# Patient Record
Sex: FEMALE | Race: WHITE | Employment: FULL TIME | URBAN - METROPOLITAN AREA
[De-identification: names, ages, dates, MRNs, and addresses within clinical notes are randomized per-mention and may not be internally consistent; named-entity substitution may affect disease eponyms.]

---

## 2017-04-24 ENCOUNTER — HOSPITAL ENCOUNTER (EMERGENCY)
Age: 47
Discharge: LEFT BEFORE DISCHARGE | End: 2017-04-24
Attending: EMERGENCY MEDICINE | Admitting: FAMILY MEDICINE
Payer: MEDICARE

## 2017-04-24 ENCOUNTER — APPOINTMENT (OUTPATIENT)
Dept: GENERAL RADIOLOGY | Age: 47
End: 2017-04-24
Payer: MEDICARE

## 2017-04-24 VITALS
SYSTOLIC BLOOD PRESSURE: 128 MMHG | OXYGEN SATURATION: 96 % | TEMPERATURE: 97.6 F | DIASTOLIC BLOOD PRESSURE: 79 MMHG | HEIGHT: 63 IN | RESPIRATION RATE: 14 BRPM | WEIGHT: 97 LBS | BODY MASS INDEX: 17.19 KG/M2 | HEART RATE: 66 BPM

## 2017-04-24 DIAGNOSIS — R07.9 CHEST PAIN, UNSPECIFIED TYPE: Primary | ICD-10-CM

## 2017-04-24 LAB
ABSOLUTE EOS #: 0.23 K/UL (ref 0–0.4)
ABSOLUTE LYMPH #: 1.94 K/UL (ref 1–4.8)
ABSOLUTE MONO #: 0.8 K/UL (ref 0.1–1.3)
ALBUMIN SERPL-MCNC: 4.5 G/DL (ref 3.5–5.2)
ALBUMIN/GLOBULIN RATIO: ABNORMAL (ref 1–2.5)
ALP BLD-CCNC: 69 U/L (ref 35–104)
ALT SERPL-CCNC: 9 U/L (ref 5–33)
ANION GAP SERPL CALCULATED.3IONS-SCNC: 13 MMOL/L (ref 9–17)
AST SERPL-CCNC: 14 U/L
BASOPHILS # BLD: 1 %
BASOPHILS ABSOLUTE: 0.11 K/UL (ref 0–0.2)
BILIRUB SERPL-MCNC: 0.4 MG/DL (ref 0.3–1.2)
BUN BLDV-MCNC: 12 MG/DL (ref 6–20)
BUN/CREAT BLD: ABNORMAL (ref 9–20)
CALCIUM SERPL-MCNC: 9.6 MG/DL (ref 8.6–10.4)
CHLORIDE BLD-SCNC: 102 MMOL/L (ref 98–107)
CO2: 26 MMOL/L (ref 20–31)
CREAT SERPL-MCNC: 0.82 MG/DL (ref 0.5–0.9)
DIFFERENTIAL TYPE: ABNORMAL
EOSINOPHILS RELATIVE PERCENT: 2 %
GFR AFRICAN AMERICAN: >60 ML/MIN
GFR NON-AFRICAN AMERICAN: >60 ML/MIN
GFR SERPL CREATININE-BSD FRML MDRD: ABNORMAL ML/MIN/{1.73_M2}
GFR SERPL CREATININE-BSD FRML MDRD: ABNORMAL ML/MIN/{1.73_M2}
GLUCOSE BLD-MCNC: 102 MG/DL (ref 70–99)
HCT VFR BLD CALC: 46.5 % (ref 36–46)
HEMOGLOBIN: 15.3 G/DL (ref 12–16)
INR BLD: 1
LYMPHOCYTES # BLD: 17 %
MAGNESIUM: 1.9 MG/DL (ref 1.6–2.6)
MCH RBC QN AUTO: 29.2 PG (ref 26–34)
MCHC RBC AUTO-ENTMCNC: 32.8 G/DL (ref 31–37)
MCV RBC AUTO: 89 FL (ref 80–100)
MONOCYTES # BLD: 7 %
MORPHOLOGY: ABNORMAL
PDW BLD-RTO: 14.6 % (ref 11.5–14.9)
PLATELET # BLD: 145 K/UL (ref 150–450)
PLATELET ESTIMATE: ABNORMAL
PMV BLD AUTO: 9.9 FL (ref 6–12)
POTASSIUM SERPL-SCNC: 3.7 MMOL/L (ref 3.7–5.3)
PROTHROMBIN TIME: 10.6 SEC (ref 9.7–12)
RBC # BLD: 5.23 M/UL (ref 4–5.2)
RBC # BLD: ABNORMAL 10*6/UL
SEG NEUTROPHILS: 73 %
SEGMENTED NEUTROPHILS ABSOLUTE COUNT: 8.32 K/UL (ref 1.3–9.1)
SODIUM BLD-SCNC: 141 MMOL/L (ref 135–144)
TOTAL PROTEIN: 7.3 G/DL (ref 6.4–8.3)
TROPONIN INTERP: NORMAL
TROPONIN INTERP: NORMAL
TROPONIN T: <0.03 NG/ML
TROPONIN T: <0.03 NG/ML
WBC # BLD: 11.4 K/UL (ref 3.5–11)
WBC # BLD: ABNORMAL 10*3/UL

## 2017-04-24 PROCEDURE — 93005 ELECTROCARDIOGRAM TRACING: CPT

## 2017-04-24 PROCEDURE — 85610 PROTHROMBIN TIME: CPT

## 2017-04-24 PROCEDURE — 71020 XR CHEST STANDARD TWO VW: CPT

## 2017-04-24 PROCEDURE — 83735 ASSAY OF MAGNESIUM: CPT

## 2017-04-24 PROCEDURE — G0378 HOSPITAL OBSERVATION PER HR: HCPCS

## 2017-04-24 PROCEDURE — 6370000000 HC RX 637 (ALT 250 FOR IP)

## 2017-04-24 PROCEDURE — 85025 COMPLETE CBC W/AUTO DIFF WBC: CPT

## 2017-04-24 PROCEDURE — 80053 COMPREHEN METABOLIC PANEL: CPT

## 2017-04-24 PROCEDURE — 6360000002 HC RX W HCPCS: Performed by: EMERGENCY MEDICINE

## 2017-04-24 PROCEDURE — 99285 EMERGENCY DEPT VISIT HI MDM: CPT

## 2017-04-24 PROCEDURE — 96374 THER/PROPH/DIAG INJ IV PUSH: CPT

## 2017-04-24 PROCEDURE — 84484 ASSAY OF TROPONIN QUANT: CPT

## 2017-04-24 PROCEDURE — 36415 COLL VENOUS BLD VENIPUNCTURE: CPT

## 2017-04-24 RX ORDER — NITROGLYCERIN 0.4 MG/1
0.4 TABLET SUBLINGUAL ONCE
Status: DISCONTINUED | OUTPATIENT
Start: 2017-04-24 | End: 2017-04-24 | Stop reason: HOSPADM

## 2017-04-24 RX ORDER — SODIUM CHLORIDE 0.9 % (FLUSH) 0.9 %
10 SYRINGE (ML) INJECTION PRN
Status: CANCELLED | OUTPATIENT
Start: 2017-04-24

## 2017-04-24 RX ORDER — MORPHINE SULFATE 4 MG/ML
4 INJECTION, SOLUTION INTRAMUSCULAR; INTRAVENOUS ONCE
Status: COMPLETED | OUTPATIENT
Start: 2017-04-24 | End: 2017-04-24

## 2017-04-24 RX ORDER — ASPIRIN 81 MG/1
TABLET, CHEWABLE ORAL
Status: COMPLETED
Start: 2017-04-24 | End: 2017-04-24

## 2017-04-24 RX ORDER — ACETAMINOPHEN 325 MG/1
650 TABLET ORAL EVERY 4 HOURS PRN
Status: CANCELLED | OUTPATIENT
Start: 2017-04-24

## 2017-04-24 RX ORDER — SODIUM CHLORIDE 0.9 % (FLUSH) 0.9 %
10 SYRINGE (ML) INJECTION EVERY 12 HOURS SCHEDULED
Status: CANCELLED | OUTPATIENT
Start: 2017-04-24

## 2017-04-24 RX ORDER — NICOTINE 21 MG/24HR
1 PATCH, TRANSDERMAL 24 HOURS TRANSDERMAL DAILY
Status: DISCONTINUED | OUTPATIENT
Start: 2017-04-24 | End: 2017-04-24 | Stop reason: HOSPADM

## 2017-04-24 RX ORDER — ASPIRIN 325 MG
325 TABLET ORAL ONCE
Status: DISCONTINUED | OUTPATIENT
Start: 2017-04-24 | End: 2017-04-24 | Stop reason: HOSPADM

## 2017-04-24 RX ADMIN — MORPHINE SULFATE 4 MG: 4 INJECTION, SOLUTION INTRAMUSCULAR; INTRAVENOUS at 11:34

## 2017-04-24 RX ADMIN — ASPIRIN 324 MG: 81 TABLET, CHEWABLE ORAL at 10:28

## 2017-04-24 ASSESSMENT — PAIN SCALES - GENERAL
PAINLEVEL_OUTOF10: 0
PAINLEVEL_OUTOF10: 6
PAINLEVEL_OUTOF10: 6

## 2017-04-24 ASSESSMENT — PAIN DESCRIPTION - LOCATION: LOCATION: BACK

## 2017-04-24 ASSESSMENT — PAIN DESCRIPTION - PAIN TYPE: TYPE: ACUTE PAIN

## 2017-04-25 LAB
EKG ATRIAL RATE: 76 BPM
EKG P AXIS: 68 DEGREES
EKG P-R INTERVAL: 126 MS
EKG Q-T INTERVAL: 358 MS
EKG QRS DURATION: 88 MS
EKG QTC CALCULATION (BAZETT): 402 MS
EKG R AXIS: 59 DEGREES
EKG T AXIS: 57 DEGREES
EKG VENTRICULAR RATE: 76 BPM

## 2018-02-25 ENCOUNTER — APPOINTMENT (OUTPATIENT)
Dept: GENERAL RADIOLOGY | Age: 48
End: 2018-02-25
Payer: MEDICARE

## 2018-02-25 ENCOUNTER — HOSPITAL ENCOUNTER (EMERGENCY)
Age: 48
Discharge: HOME | End: 2018-02-25
Attending: EMERGENCY MEDICINE
Payer: MEDICARE

## 2018-02-25 VITALS
HEART RATE: 76 BPM | DIASTOLIC BLOOD PRESSURE: 68 MMHG | WEIGHT: 106 LBS | TEMPERATURE: 98.2 F | SYSTOLIC BLOOD PRESSURE: 120 MMHG | BODY MASS INDEX: 18.78 KG/M2 | OXYGEN SATURATION: 95 % | HEIGHT: 63 IN | RESPIRATION RATE: 16 BRPM

## 2018-02-25 DIAGNOSIS — R07.9 CHEST PAIN, UNSPECIFIED TYPE: ICD-10-CM

## 2018-02-25 DIAGNOSIS — J44.1 COPD EXACERBATION (HCC): Primary | ICD-10-CM

## 2018-02-25 LAB
ABSOLUTE EOS #: 0.1 K/UL (ref 0–0.4)
ABSOLUTE IMMATURE GRANULOCYTE: ABNORMAL K/UL (ref 0–0.3)
ABSOLUTE LYMPH #: 2.5 K/UL (ref 1–4.8)
ABSOLUTE MONO #: 0.8 K/UL (ref 0.1–1.3)
ANION GAP SERPL CALCULATED.3IONS-SCNC: 13 MMOL/L (ref 9–17)
BASOPHILS # BLD: 1 % (ref 0–2)
BASOPHILS ABSOLUTE: 0.1 K/UL (ref 0–0.2)
BUN BLDV-MCNC: 6 MG/DL (ref 6–20)
BUN/CREAT BLD: ABNORMAL (ref 9–20)
CALCIUM SERPL-MCNC: 9.1 MG/DL (ref 8.6–10.4)
CHLORIDE BLD-SCNC: 100 MMOL/L (ref 98–107)
CO2: 25 MMOL/L (ref 20–31)
CREAT SERPL-MCNC: 0.76 MG/DL (ref 0.5–0.9)
DIFFERENTIAL TYPE: ABNORMAL
EKG ATRIAL RATE: 80 BPM
EKG P AXIS: 52 DEGREES
EKG P-R INTERVAL: 126 MS
EKG Q-T INTERVAL: 348 MS
EKG QRS DURATION: 82 MS
EKG QTC CALCULATION (BAZETT): 401 MS
EKG R AXIS: 55 DEGREES
EKG T AXIS: 58 DEGREES
EKG VENTRICULAR RATE: 80 BPM
EOSINOPHILS RELATIVE PERCENT: 1 % (ref 0–4)
GFR AFRICAN AMERICAN: >60 ML/MIN
GFR NON-AFRICAN AMERICAN: >60 ML/MIN
GFR SERPL CREATININE-BSD FRML MDRD: ABNORMAL ML/MIN/{1.73_M2}
GFR SERPL CREATININE-BSD FRML MDRD: ABNORMAL ML/MIN/{1.73_M2}
GLUCOSE BLD-MCNC: 101 MG/DL (ref 70–99)
HCT VFR BLD CALC: 41.8 % (ref 36–46)
HEMOGLOBIN: 14.4 G/DL (ref 12–16)
IMMATURE GRANULOCYTES: ABNORMAL %
LYMPHOCYTES # BLD: 24 % (ref 24–44)
MCH RBC QN AUTO: 30.7 PG (ref 26–34)
MCHC RBC AUTO-ENTMCNC: 34.5 G/DL (ref 31–37)
MCV RBC AUTO: 89.2 FL (ref 80–100)
MONOCYTES # BLD: 8 % (ref 1–7)
NRBC AUTOMATED: ABNORMAL PER 100 WBC
PDW BLD-RTO: 13.8 % (ref 11.5–14.9)
PLATELET # BLD: 166 K/UL (ref 150–450)
PLATELET ESTIMATE: ABNORMAL
PMV BLD AUTO: 9.7 FL (ref 6–12)
POTASSIUM SERPL-SCNC: 3.6 MMOL/L (ref 3.7–5.3)
RBC # BLD: 4.68 M/UL (ref 4–5.2)
RBC # BLD: ABNORMAL 10*6/UL
SEG NEUTROPHILS: 66 % (ref 36–66)
SEGMENTED NEUTROPHILS ABSOLUTE COUNT: 6.7 K/UL (ref 1.3–9.1)
SODIUM BLD-SCNC: 138 MMOL/L (ref 135–144)
TROPONIN INTERP: NORMAL
TROPONIN INTERP: NORMAL
TROPONIN T: <0.03 NG/ML
TROPONIN T: <0.03 NG/ML
WBC # BLD: 10.2 K/UL (ref 3.5–11)
WBC # BLD: ABNORMAL 10*3/UL

## 2018-02-25 PROCEDURE — 36415 COLL VENOUS BLD VENIPUNCTURE: CPT

## 2018-02-25 PROCEDURE — 80048 BASIC METABOLIC PNL TOTAL CA: CPT

## 2018-02-25 PROCEDURE — 71046 X-RAY EXAM CHEST 2 VIEWS: CPT

## 2018-02-25 PROCEDURE — 85025 COMPLETE CBC W/AUTO DIFF WBC: CPT

## 2018-02-25 PROCEDURE — 93005 ELECTROCARDIOGRAM TRACING: CPT

## 2018-02-25 PROCEDURE — 84484 ASSAY OF TROPONIN QUANT: CPT

## 2018-02-25 PROCEDURE — 99285 EMERGENCY DEPT VISIT HI MDM: CPT

## 2018-02-25 RX ORDER — PREDNISONE 10 MG/1
TABLET ORAL
Qty: 20 TABLET | Refills: 0 | Status: SHIPPED | OUTPATIENT
Start: 2018-02-25 | End: 2018-03-07

## 2018-02-25 ASSESSMENT — ENCOUNTER SYMPTOMS
COUGH: 1
VOMITING: 0
EYE PAIN: 0
EYE DISCHARGE: 0
ABDOMINAL PAIN: 0
SHORTNESS OF BREATH: 1
NAUSEA: 0
DIARRHEA: 0
SORE THROAT: 0

## 2018-02-25 ASSESSMENT — PAIN SCALES - GENERAL: PAINLEVEL_OUTOF10: 5

## 2018-02-25 ASSESSMENT — PAIN DESCRIPTION - LOCATION: LOCATION: ARM

## 2018-02-25 ASSESSMENT — PAIN DESCRIPTION - DESCRIPTORS: DESCRIPTORS: SHARP

## 2018-02-25 ASSESSMENT — PAIN DESCRIPTION - ORIENTATION: ORIENTATION: RIGHT

## 2018-02-25 NOTE — ED PROVIDER NOTES
Cardiovascular: Normal rate and regular rhythm. Exam reveals no gallop and no friction rub. No murmur heard. Strong radial and DP pulses bilaterally, no murmur   Pulmonary/Chest: Effort normal. No respiratory distress. She has wheezes. She has no rales. Patient with end expiratory wheezes throughout   Abdominal: Soft. Bowel sounds are normal. There is no tenderness. There is no rebound and no guarding. Abdomen soft, nontender, no pulsatile mass   Musculoskeletal: She exhibits no edema or deformity. No lower extremity swelling or tenderness   Neurological: She is alert and oriented to person, place, and time. She exhibits normal muscle tone. Alert and oriented ×3  Cranial nerves II through XII grossly intact bilaterally  5/5 strength and sensation intact to bilateral upper and lower extremities  Negative finger-nose bilaterally, negative heel-to-shin bilaterally, negative pronator drift   Skin: Skin is warm and dry. No rash noted. Psychiatric: She has a normal mood and affect. Thought content normal.   Nursing note and vitals reviewed.       DIFFERENTIAL  DIAGNOSIS     PLAN (LABS / IMAGING / EKG):  Orders Placed This Encounter   Procedures    XR CHEST STANDARD (2 VW)    Troponin    CBC Auto Differential    Basic Metabolic Panel    Telemetry monitoring    Inpatient consult to Primary Care Provider    Pulse oximetry, continuous    EKG 12 Lead    Insert peripheral IV       MEDICATIONS ORDERED:  Orders Placed This Encounter   Medications    predniSONE (DELTASONE) 10 MG tablet     Sig: Take 4 tablets by mouth once daily for 5 days     Dispense:  20 tablet     Refill:  0       DDX: ACS, COPD exacerbation, pulmonary embolism, aortic dissection, URI, bronchitis, pneumonia    DIAGNOSTIC RESULTS / EMERGENCY DEPARTMENT COURSE / MDM     LABS:  Results for orders placed or performed during the hospital encounter of 02/25/18   Troponin   Result Value Ref Range    Troponin T <0.03 <0.03 ng/mL    Troponin

## 2018-07-18 ENCOUNTER — OFFICE VISIT (OUTPATIENT)
Dept: FAMILY MEDICINE CLINIC | Age: 48
End: 2018-07-18
Payer: MEDICARE

## 2018-07-18 VITALS
RESPIRATION RATE: 18 BRPM | DIASTOLIC BLOOD PRESSURE: 70 MMHG | HEART RATE: 64 BPM | WEIGHT: 104 LBS | SYSTOLIC BLOOD PRESSURE: 110 MMHG | OXYGEN SATURATION: 98 % | TEMPERATURE: 97.9 F | BODY MASS INDEX: 19.14 KG/M2 | HEIGHT: 62 IN

## 2018-07-18 DIAGNOSIS — R05.9 COUGH: ICD-10-CM

## 2018-07-18 DIAGNOSIS — M25.551 CHRONIC HIP PAIN, RIGHT: ICD-10-CM

## 2018-07-18 DIAGNOSIS — J44.9 CHRONIC OBSTRUCTIVE PULMONARY DISEASE, UNSPECIFIED COPD TYPE (HCC): ICD-10-CM

## 2018-07-18 DIAGNOSIS — G89.29 CHRONIC BILATERAL LOW BACK PAIN WITH BILATERAL SCIATICA: Primary | ICD-10-CM

## 2018-07-18 DIAGNOSIS — Z45.89 TYMPANOSTOMY TUBE CHECK: ICD-10-CM

## 2018-07-18 DIAGNOSIS — M54.42 CHRONIC BILATERAL LOW BACK PAIN WITH BILATERAL SCIATICA: Primary | ICD-10-CM

## 2018-07-18 DIAGNOSIS — Z12.39 SCREENING FOR BREAST CANCER: ICD-10-CM

## 2018-07-18 DIAGNOSIS — Z00.00 PREVENTATIVE HEALTH CARE: ICD-10-CM

## 2018-07-18 DIAGNOSIS — I25.2 HISTORY OF MYOCARDIAL INFARCTION: ICD-10-CM

## 2018-07-18 DIAGNOSIS — M54.41 CHRONIC BILATERAL LOW BACK PAIN WITH BILATERAL SCIATICA: Primary | ICD-10-CM

## 2018-07-18 DIAGNOSIS — Z23 NEED FOR PROPHYLACTIC VACCINATION AGAINST STREPTOCOCCUS PNEUMONIAE (PNEUMOCOCCUS): ICD-10-CM

## 2018-07-18 DIAGNOSIS — G89.29 CHRONIC HIP PAIN, RIGHT: ICD-10-CM

## 2018-07-18 PROCEDURE — G8926 SPIRO NO PERF OR DOC: HCPCS | Performed by: NURSE PRACTITIONER

## 2018-07-18 PROCEDURE — 99203 OFFICE O/P NEW LOW 30 MIN: CPT | Performed by: NURSE PRACTITIONER

## 2018-07-18 PROCEDURE — 3023F SPIROM DOC REV: CPT | Performed by: NURSE PRACTITIONER

## 2018-07-18 PROCEDURE — 90732 PPSV23 VACC 2 YRS+ SUBQ/IM: CPT | Performed by: NURSE PRACTITIONER

## 2018-07-18 PROCEDURE — G8427 DOCREV CUR MEDS BY ELIG CLIN: HCPCS | Performed by: NURSE PRACTITIONER

## 2018-07-18 PROCEDURE — 90471 IMMUNIZATION ADMIN: CPT | Performed by: NURSE PRACTITIONER

## 2018-07-18 PROCEDURE — 4004F PT TOBACCO SCREEN RCVD TLK: CPT | Performed by: NURSE PRACTITIONER

## 2018-07-18 PROCEDURE — G8420 CALC BMI NORM PARAMETERS: HCPCS | Performed by: NURSE PRACTITIONER

## 2018-07-18 RX ORDER — NAPROXEN 500 MG/1
500 TABLET ORAL 2 TIMES DAILY PRN
Qty: 60 TABLET | Refills: 1 | Status: SHIPPED | OUTPATIENT
Start: 2018-07-18 | End: 2019-10-17 | Stop reason: ALTCHOICE

## 2018-07-18 RX ORDER — ASPIRIN 81 MG/1
81 TABLET ORAL DAILY
Qty: 30 TABLET | Refills: 3 | Status: SHIPPED | OUTPATIENT
Start: 2018-07-18 | End: 2018-12-06

## 2018-07-18 RX ORDER — TIOTROPIUM BROMIDE AND OLODATEROL 3.124; 2.736 UG/1; UG/1
SPRAY, METERED RESPIRATORY (INHALATION)
Refills: 1 | COMMUNITY
Start: 2018-07-06 | End: 2019-10-17 | Stop reason: ALTCHOICE

## 2018-07-18 ASSESSMENT — ENCOUNTER SYMPTOMS
COUGH: 1
BACK PAIN: 1
WHEEZING: 1
SORE THROAT: 0
BOWEL INCONTINENCE: 0
SHORTNESS OF BREATH: 1
RHINORRHEA: 0
SINUS PAIN: 0

## 2018-07-18 ASSESSMENT — PATIENT HEALTH QUESTIONNAIRE - PHQ9
2. FEELING DOWN, DEPRESSED OR HOPELESS: 0
SUM OF ALL RESPONSES TO PHQ9 QUESTIONS 1 & 2: 0
1. LITTLE INTEREST OR PLEASURE IN DOING THINGS: 0
SUM OF ALL RESPONSES TO PHQ QUESTIONS 1-9: 0

## 2018-07-18 NOTE — PROGRESS NOTES
Aleida Sanders. Jennifer Christianson, APRN-CNP  [address] [facility]  [address] [facility] [address]  [place] [address]  Dept: [phone]  Dept Fax: [phone]      HPI:     Disha Jackson is a 52 y.o. female who presents today for her medical conditions/complaints as noted below. Disha Jackson is c/o of Establish Care; Back Pain; and Hip Pain    Patient is here today to establish care, previous PCP was- Dr. Andrés Jefferson, last seen 2-3 years ago. Review of Medical History:    COPD: Currently on Ventolin and Stiolto, s/s well controlled, heat does make s/s worse. Never hospitalized for COPD. Quit smoking 1 week ago, was smoking 1 PPD. Follows w/Dr. All Peguero- has a PFT scheduled in 6 months. MI: States she had a \"mild heart attack\", also a TIA about 10 years ago. She previously had a stress test, which she states was OK. Does not take daily baby ASA daily, but states she was told to. Denies chest pain. Back Pain   Chronicity: Anna Monday last year, felt like legs gave out, had had lower back pain for about 20 years, pain goes into right hip. The current episode started more than 1 year ago (Denies injury, does not do heavy lifting). The problem occurs constantly. The problem has been gradually worsening since onset. The pain is present in the lumbar spine and gluteal (Right hip). The quality of the pain is described as aching. The pain radiates to the right thigh (To right hip, down RLE). The pain is at a severity of 5/10. The pain is moderate. The pain is the same all the time. The symptoms are aggravated by sitting. Associated symptoms include numbness (B/l LE's, more RLE) and tingling. Pertinent negatives include no bladder incontinence, bowel incontinence or chest pain. She has tried NSAIDs and analgesics (Tylenol) for the symptoms. The treatment provided no relief.      Past Medical History:   Diagnosis Date    COPD (chronic obstructive pulmonary disease) (HCC)     MI, old Past Surgical History:   Procedure Laterality Date    HYSTERECTOMY      TYMPANOSTOMY TUBE PLACEMENT      About 3 years ago per patient- b/l       Family History   Problem Relation Age of Onset    High Blood Pressure Father     Cancer Paternal Uncle     Cancer Paternal Grandmother        Social History   Substance Use Topics    Smoking status: Current Every Day Smoker     Packs/day: 0.25     Types: Cigarettes    Smokeless tobacco: Never Used      Comment: pt smokes about 1 cigg daily    Alcohol use No      Current Outpatient Prescriptions   Medication Sig Dispense Refill    VENTOLIN  (90 Base) MCG/ACT inhaler inhale 2 puffs by mouth every 4 hours  1    STIOLTO RESPIMAT 2.5-2.5 MCG/ACT AERS   1    aspirin EC 81 MG EC tablet Take 1 tablet by mouth daily 30 tablet 3    naproxen (NAPROSYN) 500 MG tablet Take 1 tablet by mouth 2 times daily as needed for Pain 60 tablet 1     No current facility-administered medications for this visit. No Known Allergies    Health Maintenance   Topic Date Due    HIV screen  09/27/1985    DTaP/Tdap/Td vaccine (1 - Tdap) 09/27/1989    Cervical cancer screen  09/27/1991    Flu vaccine (1) 09/01/2018    Lipid screen  08/17/2020    Pneumococcal med risk  Completed       Subjective:      Review of Systems   HENT: Negative for ear pain, rhinorrhea, sinus pain and sore throat. Respiratory: Positive for cough (Since stopping smoking), shortness of breath and wheezing. Cardiovascular: Negative for chest pain. Gastrointestinal: Negative for bowel incontinence. Genitourinary: Negative for bladder incontinence. Musculoskeletal: Positive for arthralgias and back pain. Neurological: Positive for tingling and numbness (B/l LE's, more RLE).      Objective:     /70   Pulse 64   Temp 97.9 °F (36.6 °C)   Resp 18   Ht 5' 2\" (1.575 m)   Wt 104 lb (47.2 kg)   SpO2 98%   BMI 19.02 kg/m²   Physical Exam   Constitutional: She is oriented to person, place, and time. She appears well-developed and well-nourished. Non-toxic appearance. She does not have a sickly appearance. HENT:   Head: Normocephalic and atraumatic. Right Ear: Tympanic membrane and external ear normal. A foreign body (Tympanostomy tube) is present. Left Ear: Tympanic membrane, external ear and ear canal normal.   Nose: Nose normal. Right sinus exhibits no maxillary sinus tenderness and no frontal sinus tenderness. Left sinus exhibits no maxillary sinus tenderness and no frontal sinus tenderness. Mouth/Throat: Oropharynx is clear and moist.   Eyes: Conjunctivae are normal.   Neck: Normal range of motion. Cardiovascular: Normal rate, regular rhythm and normal heart sounds. Exam reveals no gallop and no friction rub. No murmur heard. Pulmonary/Chest: Effort normal and breath sounds normal. No accessory muscle usage. No respiratory distress. She has no decreased breath sounds. She has no wheezes. She has no rhonchi. She has no rales. Musculoskeletal:        Right hip: She exhibits decreased range of motion, tenderness (Anterior) and bony tenderness. She exhibits normal strength, no swelling, no crepitus, no deformity and no laceration. Lumbar back: She exhibits decreased range of motion (- straight leg raise), tenderness, bony tenderness and pain. She exhibits no swelling, no edema, no deformity and no laceration. Lymphadenopathy:     She has no cervical adenopathy. Neurological: She is alert and oriented to person, place, and time. She has normal strength. Reflex Scores:       Patellar reflexes are 2+ on the right side and 2+ on the left side. Skin: Skin is warm and dry. No rash noted. No erythema. Psychiatric: She has a normal mood and affect. Her behavior is normal.   Nursing note and vitals reviewed.     Data:     Lab Results   Component Value Date     02/25/2018    K 3.6 02/25/2018     02/25/2018    CO2 25 02/25/2018    BUN 6 02/25/2018    CREATININE 0.76 Dispense: 30 tablet; Refill: 3    5. Tympanostomy tube check  -Will refer for further evaluation and possible treatment:  - [facility] Joanne Godfrey MD    6. Need for prophylactic vaccination against Streptococcus pneumoniae (pneumococcus)  -Vaccine administered in the office:  - PNEUMOVAX 23 subcutaneous/IM (Pneumococcal polysaccharide vaccine 23-valent >= 3yo)    7. Screening for breast cancer  - [facility] DIGITAL SCREEN W OR WO CAD BILATERAL; Future    8. Preventative health care  -Labs as follows:  - CBC; Future  - Comprehensive Metabolic Panel; Future  - Hemoglobin A1C; Future  - Lipid Panel; Future  - HIV Screen; Future  - TSH with Reflex; Future  - Vitamin D 25 Hydroxy; Future    Will obtain previous medical records from former PCP's office. Return in about 1 month (around 8/18/2018) for PAP, labs, back pain f/u. Patient given educational materials on back exercises. Discussed use, benefit, and side effects of prescribed medications. Barriers to medication compliance addressed. All patient questions answered. Patient voiced understanding.

## 2018-07-18 NOTE — PATIENT INSTRUCTIONS
Patient Education        Low Back Pain: Exercises  Your Care Instructions  Here are some examples of typical rehabilitation exercises for your condition. Start each exercise slowly. Ease off the exercise if you start to have pain. Your doctor or physical therapist will tell you when you can start these exercises and which ones will work best for you. How to do the exercises  Press-up    1. Lie on your stomach, supporting your body with your forearms. 2. Press your elbows down into the floor to raise your upper back. As you do this, relax your stomach muscles and allow your back to arch without using your back muscles. As your press up, do not let your hips or pelvis come off the floor. 3. Hold for 15 to 30 seconds, then relax. 4. Repeat 2 to 4 times. Alternate arm and leg (bird dog) exercise    Do this exercise slowly. Try to keep your body straight at all times, and do not let one hip drop lower than the other. 1. Start on the floor, on your hands and knees. 2. Tighten your belly muscles. 3. Raise one leg off the floor, and hold it straight out behind you. Be careful not to let your hip drop down, because that will twist your trunk. 4. Hold for about 6 seconds, then lower your leg and switch to the other leg. 5. Repeat 8 to 12 times on each leg. 6. Over time, work up to holding for 10 to 30 seconds each time. 7. If you feel stable and secure with your leg raised, try raising the opposite arm straight out in front of you at the same time. Knee-to-chest exercise    1. Lie on your back with your knees bent and your feet flat on the floor. 2. Bring one knee to your chest, keeping the other foot flat on the floor (or keeping the other leg straight, whichever feels better on your lower back). 3. Keep your lower back pressed to the floor. Hold for at least 15 to 30 seconds. 4. Relax, and lower the knee to the starting position. 5. Repeat with the other leg. Repeat 2 to 4 times with each leg.   6. To get touching the floor and the other heel touching the wall. 4. Repeat with your other leg. 5. Do 2 to 4 times for each leg. Hip flexor stretch    1. Kneel on the floor with one knee bent and one leg behind you. Place your forward knee over your foot. Keep your other knee touching the floor. 2. Slowly push your hips forward until you feel a stretch in the upper thigh of your rear leg. 3. Hold the stretch for at least 15 to 30 seconds. Repeat with your other leg. 4. Do 2 to 4 times on each side. Wall sit    1. Stand with your back 10 to 12 inches away from a wall. 2. Lean into the wall until your back is flat against it. 3. Slowly slide down until your knees are slightly bent, pressing your lower back into the wall. 4. Hold for about 6 seconds, then slide back up the wall. 5. Repeat 8 to 12 times. Follow-up care is a key part of your treatment and safety. Be sure to make and go to all appointments, and call your doctor if you are having problems. It's also a good idea to know your test results and keep a list of the medicines you take. Where can you learn more? Go to [email or web address]://[email or web address] org and sign in to your [employer] account. Enter B226 in the Opternative box to learn more about \"Low Back Pain: Exercises. \"     If you do not have an account, please click on the \"Sign Up Now\" link. Current as of: November 29, 2017  Content Version: 11.6  © 0745-1163 [employer]. Care instructions adapted under license by [facility] ([facility]). If you have questions about a medical condition or this instruction, always ask your healthcare professional. [place] [address] any warranty or liability for your use of this information.      Patient Education          pneumococcal polysaccharides vaccine (PPSV), 23-valent  Pronunciation:  Lizzette Russell-SOLEDAD shah  Brand:  Pneumovax 23  What is the most important information I should know about this (shot) under the skin or into a muscle of your arm or thigh. You will receive this injection in a doctor's office or other clinic setting. PPSV is usually given as a routine vaccination in adults who are 72 years and older. PPSV may also be given to people between the ages 3and 59years old who have:  · heart disease, lung disease, or diabetes;  · a cerebrospinal fluid leak, or a cochlear implant (an electronic hearing device);  · alcoholism or liver disease (including cirrhosis);  · sickle cell disease or a disorder of the spleen;  · a weak immune system caused by HIV, AIDS, cancer, kidney failure, organ transplantation, or a damaged spleen; or  · a weak immune system caused by taking steroids or receiving chemotherapy or radiation treatment. PPSV may also be given to people between the ages 23and 59years old who smoke or have asthma. PPSV should be given at least 2 weeks before the start of any treatment that can weaken your immune system. PPSV is also given at least 2 weeks before you undergo a splenectomy (surgical removal of the spleen). The timing of this vaccination is very important for it to be effective. Follow your doctor's instructions. Your doctor may recommend treating fever and pain with an aspirin-free pain reliever such as acetaminophen (Tylenol) or ibuprofen (Motrin, Advil, and others) when the shot is given and for the next 24 hours. Follow the label directions or your doctor's instructions about how much of this medicine to take. If your doctor has prescribed an antibiotic (such as penicillin) to help prevent infection with pneumococcal bacteria, do not stop using the antibiotic after you receive the PPSV. Take the antibiotic for the entire length of time prescribed by your doctor. Most people receive only one PPSV shot during their lifetime.  However, people in certain age groups or with certain disease conditions that put them at risk of infection may need to receive more than one additional information about pneumococcal polysaccharides vaccine. You may also find additional information from your local health department or the Centers for Disease Control and Prevention. Remember, keep this and all other medicines out of the reach of children, never share your medicines with others, and use this medication only for the indication prescribed. Every effort has been made to ensure that the information provided by [address] is accurate, up-to-date, and complete, but no guarantee is made to that effect. Drug information contained herein may be time sensitive. [facility] information has been compiled for use by healthcare practitioners and consumers in the [place] and therefore [facility] does not warrant that uses outside of the [place] are appropriate, unless specifically indicated otherwise. [facility]'s drug information does not endorse drugs, diagnose patients or recommend therapy. [facility]'s drug information is an informational resource designed to assist licensed healthcare practitioners in caring for their patients and/or to serve consumers viewing this service as a supplement to, and not a substitute for, the expertise, skill, knowledge and judgment of healthcare practitioners. The absence of a warning for a given drug or drug combination in no way should be construed to indicate that the drug or drug combination is safe, effective or appropriate for any given patient. [facility] does not assume any responsibility for any aspect of healthcare administered with the aid of information [facility] provides. The information contained herein is not intended to cover all possible uses, directions, precautions, warnings, drug interactions, allergic reactions, or adverse effects. If you have questions about the drugs you are taking, check with your doctor, nurse or pharmacist.  Copyright 2605-7746 [address] Version: 5.02. Revision date: 10/17/2012.   Care instructions adapted

## 2018-07-26 ENCOUNTER — HOSPITAL ENCOUNTER (OUTPATIENT)
Dept: PHYSICAL THERAPY | Age: 48
Setting detail: THERAPIES SERIES
Discharge: HOME | End: 2018-07-26
Payer: MEDICARE

## 2018-07-26 ENCOUNTER — TELEPHONE (OUTPATIENT)
Dept: FAMILY MEDICINE CLINIC | Age: 48
End: 2018-07-26

## 2018-07-26 DIAGNOSIS — Z45.89 TYMPANOSTOMY TUBE CHECK: Primary | ICD-10-CM

## 2018-07-26 PROCEDURE — 97110 THERAPEUTIC EXERCISES: CPT

## 2018-07-26 PROCEDURE — 97161 PT EVAL LOW COMPLEX 20 MIN: CPT

## 2018-07-26 ASSESSMENT — PAIN DESCRIPTION - PAIN TYPE: TYPE: CHRONIC PAIN

## 2018-07-26 ASSESSMENT — PAIN DESCRIPTION - ONSET: ONSET: UNABLE TO TELL

## 2018-07-26 ASSESSMENT — PAIN DESCRIPTION - PROGRESSION: CLINICAL_PROGRESSION: GRADUALLY WORSENING

## 2018-07-26 ASSESSMENT — PAIN DESCRIPTION - DESCRIPTORS: DESCRIPTORS: CONSTANT;STABBING

## 2018-07-26 ASSESSMENT — PAIN DESCRIPTION - FREQUENCY: FREQUENCY: CONTINUOUS

## 2018-07-26 ASSESSMENT — PAIN DESCRIPTION - LOCATION: LOCATION: BACK

## 2018-07-26 ASSESSMENT — PAIN SCALES - GENERAL: PAINLEVEL_OUTOF10: 8

## 2018-07-26 ASSESSMENT — PAIN DESCRIPTION - ORIENTATION: ORIENTATION: LOWER;RIGHT;LEFT

## 2018-07-26 NOTE — PROGRESS NOTES
Physical Therapy  Initial Assessment  Date: 2018  Patient Name: David Atkins  MRN: [id number]  : 1970     Treatment Diagnosis: Difficulty Walking     Restrictions  Position Activity Restriction  Other position/activity restrictions: No activity restrictions per physician    General  Chart Reviewed: Yes  Patient assessed for rehabilitation services?: Yes  Response To Previous Treatment: Not applicable  Family / Caregiver Present: No  Referring Practitioner: BRET Danielle CNP  Referral Date : 18  Diagnosis: Chronic (B) low back pain with (B) sciatica   Follows Commands: Within Functional Limits  General Comment  Comments: History of low back pain/ongoing for years. Cause unknown/no isolated incident or trauma reported. Saw PCP - X-ray pending, pain medication  and outpt PT ordered. PT Visit Information  Onset Date: 18  PT Insurance Information: [employer]  Total # of Visits Approved: 12  Total # of Visits to Date: 1  No Show: 0  Canceled Appointment: 0    Subjective  Pain Screening  Patient Currently in Pain: Yes  Pain Assessment  Pain Assessment: 0-10  Pain Level: 8  Pain Type: Chronic pain  Pain Location: Back  Pain Orientation: Lower;Right;Left  Pain Radiating Towards: the (R) hip   Pain Descriptors: Constant; Stabbing  Pain Frequency: Continuous  Pain Onset: Unable to tell  Clinical Progression: Gradually worsening  Effect of Pain on Daily Activities: Difficulty sleeping/all positions -\"pain\" wakes her at night. Standing/ambulation, sitting, bending/stooping, squatting, stair climbing and lifting/carrying.    Patient's Stated Pain Goal: No pain  Pain Intervention(s): Medication (see eMar);Repositioned;Rest;Cold applied  Response to Pain Intervention: Patient Satisfied  Multiple Pain Sites: No  Vision/Hearing  Vision  Vision: Within Functional Limits  Hearing  Hearing: Within functional limits  Orientation  Overall Orientation Status: Within Normal Limits  Social/Functional History  Occupation: Full time employment  Type of occupation: Housekeeping @ [employer]     Objective  Observation/Palpation  Palpation: No tenderness was noted throughout the lumbar region. However, tenderness was noted at the (R) greater trochanter  Observation: Forward head, rounded shoulder(s) Slight (B) genu varus and recurvatum was noted. Range of Motion  PROM RLE (degrees)  RLE PROM: WNL  PROM LLE (degrees)  LLE PROM: WNL  Spine  Lumbar: Flexion - WNL, (B) rotation - 50 %, (B) lateral flexion - WNL, Extension - attempted but \"too painful\" to assess. Strength  Strength RLE  Strength RLE: Exception  R Hip Flexion: 2/5  R Hip Extension: 2/5  R Hip ABduction: 3-/5  R Hip ADduction: 2/5  R Knee Flexion: 5/5  R Knee Extension: 5/5  R Ankle Dorsiflexion: 5/5  R Ankle Plantar flexion: 5/5  Strength LLE  Strength LLE: WFL  Strength Other  Other: Low back strength grossly 2+/5  Tone  Tone RLE  RLE Tone: Normotonic  Tone LLE  LLE Tone: Normotonic  Motor Control  Gross Motor?: WNL  Additional Measures  Flexibility: No hamstring tightness was grossly noted (B)   Sensation  Overall Sensation Status:  (With Light Touch )  Ambulation  Ambulation?: Yes  Ambulation 1  Surface: level tile  Device: No Device  Assistance: Independent  Quality of Gait: (L) lateral lean with limited (R) hip motion. Decreased step length on the (L) LE with poor push off on the (R) LE  Distance: 200 ft    Assessment  Conditions Requiring Skilled Therapeutic Intervention  Body structures, Functions, Activity limitations: Decreased functional mobility ; Decreased ADL status; Decreased ROM; Decreased strength  Treatment Diagnosis: Difficulty Walking   Prognosis: Good  Decision Making: Low Complexity  History: No personal factors were apparent that may have an effect on this pt's plan of care. Exam: Pain/decreased ROM and strength causing her to have difficulty sleeping/all positions -\"pain\" wakes her at night. Instruction in HEP     [] Work Conditioning  [] Manual Therapy             [] Aquatic Therapy               [x] Iontophoresis: 4 mg/mL      Dexamethasone Sodium      Phosphate 40-80mAmin (Allergies Reviewed)     Frequency:     3      X/wk x      4    wk's      [x] Plans/Goals, Risk/Benefits discussed with pt/family  Comprehension of Education [] yes  [x] Needs Review  Pt/Family Education: [x] Verbal  [x] Demo  [] Written    More objective information is available upon request.  Thank you for this referral.        Medicare/Regulatory Requirements:  I have reviewed this plan of care and certify a need for   Medically necessary rehabilitation services.   [] Physician Signature    Date:     Electronically signed by: Tina Morfin, PT  DPT    [facility]) @ [place] [address].  Phone [phone]  Fax [phone]    Therapy Time   Individual Concurrent Group Co-treatment   Time In 6064         Time Out 5842         Minutes 60           Treatment Charges: Minutes Units   []  Ultrasound     []  Electrical-Stim     []  Iontophoresis     []  Traction     []  Massage       [x]  Eval: Low Complex 45 1   []  Gait     [x]  Ther Exercise 15  1    []  Manual Therapy       []  Ther Activities       []  Aquatics     []  Vasopneumatic Device     []  Neuro Re-Ed       []  Other       Total Treatment Time: 60 2

## 2018-07-26 NOTE — TELEPHONE ENCOUNTER
Pt states ENT that put her tubes in  When she was inpatient can't see her until the end of the year because she would be considered a new patient. Patient requesting a new referral to E[place] and [place].

## 2018-08-01 ENCOUNTER — HOSPITAL ENCOUNTER (OUTPATIENT)
Dept: PHYSICAL THERAPY | Age: 48
Setting detail: THERAPIES SERIES
Discharge: HOME | End: 2018-08-01
Payer: MEDICARE

## 2018-08-01 PROCEDURE — 97110 THERAPEUTIC EXERCISES: CPT

## 2018-08-01 PROCEDURE — 97033 APP MDLTY 1+IONTPHRSIS EA 15: CPT

## 2018-08-01 ASSESSMENT — PAIN DESCRIPTION - FREQUENCY: FREQUENCY: CONTINUOUS

## 2018-08-01 ASSESSMENT — PAIN DESCRIPTION - ORIENTATION: ORIENTATION: LOWER;LEFT;RIGHT

## 2018-08-01 ASSESSMENT — PAIN DESCRIPTION - PAIN TYPE: TYPE: CHRONIC PAIN

## 2018-08-01 ASSESSMENT — PAIN SCALES - GENERAL: PAINLEVEL_OUTOF10: 9

## 2018-08-01 ASSESSMENT — PAIN DESCRIPTION - DESCRIPTORS: DESCRIPTORS: CONSTANT;STABBING

## 2018-08-01 ASSESSMENT — PAIN DESCRIPTION - LOCATION: LOCATION: BACK

## 2018-08-01 NOTE — PROGRESS NOTES
status; Decreased ROM; Decreased strength  Assessment: Initiated a therapeutic exercise to include core stabilization/hip strengthening. Demonstrated well with min cues. Treatment Diagnosis: Difficulty Walking   Prognosis: Good  Patient Education: Home exercise program - Supine hook lying posterior pelvic tilt 1-2 sec hold x 10 reps. Ice x 10 minutes to the  (R) greater trochanter (Daily 2-3 sessions). Reviewed with the pt. Demonstrated well with min cues.      Plan  Times per week: 3  Plan weeks: 4  Current Treatment Recommendations: Strengthening, Home Exercise Program, ROM, Patient/Caregiver Education & Training, Modalities, Pain Management    Therapy Time   Individual Concurrent Group Co-treatment   Time In 5826         Time Out 1930         Minutes 60           Treatment Charges: Minutes Units   []  Ultrasound     []  Electrical-Stim     [x]  Iontophoresis 30 1   []  Traction     []  Massage       []  Eval     []  Gait     [x]  Ther Exercise 30  2    []  Manual Therapy       []  Ther Activities       []  Aquatics     []  Vasopneumatic Device     []  Neuro Re-Ed       []  Other       Total Treatment Time: 61 3      Jose Guadalupe ETIENNE Ahmadi DPT

## 2018-08-02 ENCOUNTER — HOSPITAL ENCOUNTER (OUTPATIENT)
Facility: CLINIC | Age: 48
Discharge: HOME | End: 2018-08-04
Payer: MEDICARE

## 2018-08-02 ENCOUNTER — HOSPITAL ENCOUNTER (OUTPATIENT)
Dept: GENERAL RADIOLOGY | Facility: CLINIC | Age: 48
Discharge: HOME | End: 2018-08-04
Payer: MEDICARE

## 2018-08-02 ENCOUNTER — HOSPITAL ENCOUNTER (OUTPATIENT)
Age: 48
Setting detail: SPECIMEN
Discharge: HOME | End: 2018-08-02
Payer: MEDICARE

## 2018-08-02 DIAGNOSIS — M54.42 CHRONIC BILATERAL LOW BACK PAIN WITH BILATERAL SCIATICA: Primary | ICD-10-CM

## 2018-08-02 DIAGNOSIS — G89.29 CHRONIC HIP PAIN, RIGHT: ICD-10-CM

## 2018-08-02 DIAGNOSIS — M54.42 CHRONIC BILATERAL LOW BACK PAIN WITH BILATERAL SCIATICA: ICD-10-CM

## 2018-08-02 DIAGNOSIS — G89.29 CHRONIC BILATERAL LOW BACK PAIN WITH BILATERAL SCIATICA: Primary | ICD-10-CM

## 2018-08-02 DIAGNOSIS — M25.551 CHRONIC HIP PAIN, RIGHT: ICD-10-CM

## 2018-08-02 DIAGNOSIS — M54.41 CHRONIC BILATERAL LOW BACK PAIN WITH BILATERAL SCIATICA: ICD-10-CM

## 2018-08-02 DIAGNOSIS — G89.29 CHRONIC BILATERAL LOW BACK PAIN WITH BILATERAL SCIATICA: ICD-10-CM

## 2018-08-02 DIAGNOSIS — Z00.00 PREVENTATIVE HEALTH CARE: ICD-10-CM

## 2018-08-02 DIAGNOSIS — M54.41 CHRONIC BILATERAL LOW BACK PAIN WITH BILATERAL SCIATICA: Primary | ICD-10-CM

## 2018-08-02 DIAGNOSIS — M51.36 DDD (DEGENERATIVE DISC DISEASE), LUMBAR: ICD-10-CM

## 2018-08-02 PROBLEM — R74.01 ELEVATED ALT MEASUREMENT: Status: ACTIVE | Noted: 2018-08-02

## 2018-08-02 PROBLEM — D69.6 THROMBOCYTOPENIA (HCC): Status: ACTIVE | Noted: 2018-08-02

## 2018-08-02 PROBLEM — M51.369 DDD (DEGENERATIVE DISC DISEASE), LUMBAR: Status: ACTIVE | Noted: 2018-08-02

## 2018-08-02 LAB
ALBUMIN SERPL-MCNC: 4.2 G/DL (ref 3.5–5.2)
ALBUMIN/GLOBULIN RATIO: 1.4 (ref 1–2.5)
ALP BLD-CCNC: 78 U/L (ref 35–104)
ALT SERPL-CCNC: 42 U/L (ref 5–33)
ANION GAP SERPL CALCULATED.3IONS-SCNC: 9 MMOL/L (ref 9–17)
AST SERPL-CCNC: 22 U/L
BILIRUB SERPL-MCNC: 0.4 MG/DL (ref 0.3–1.2)
BUN BLDV-MCNC: 13 MG/DL (ref 6–20)
BUN/CREAT BLD: ABNORMAL (ref 9–20)
CALCIUM SERPL-MCNC: 8.9 MG/DL (ref 8.6–10.4)
CHLORIDE BLD-SCNC: 108 MMOL/L (ref 98–107)
CHOLESTEROL/HDL RATIO: 2
CHOLESTEROL: 126 MG/DL
CO2: 26 MMOL/L (ref 20–31)
CREAT SERPL-MCNC: 0.78 MG/DL (ref 0.5–0.9)
ESTIMATED AVERAGE GLUCOSE: 117 MG/DL
GFR AFRICAN AMERICAN: >60 ML/MIN
GFR NON-AFRICAN AMERICAN: >60 ML/MIN
GFR SERPL CREATININE-BSD FRML MDRD: ABNORMAL ML/MIN/{1.73_M2}
GFR SERPL CREATININE-BSD FRML MDRD: ABNORMAL ML/MIN/{1.73_M2}
GLUCOSE BLD-MCNC: 98 MG/DL (ref 70–99)
HBA1C MFR BLD: 5.7 % (ref 4–6)
HCT VFR BLD CALC: 44.1 % (ref 36.3–47.1)
HDLC SERPL-MCNC: 62 MG/DL
HEMOGLOBIN: 14.1 G/DL (ref 11.9–15.1)
HIV AG/AB: NONREACTIVE
LDL CHOLESTEROL: 49 MG/DL (ref 0–130)
MCH RBC QN AUTO: 29.1 PG (ref 25.2–33.5)
MCHC RBC AUTO-ENTMCNC: 32 G/DL (ref 28.4–34.8)
MCV RBC AUTO: 91.1 FL (ref 82.6–102.9)
NRBC AUTOMATED: 0 PER 100 WBC
PDW BLD-RTO: 14.6 % (ref 11.8–14.4)
PLATELET # BLD: 130 K/UL (ref 138–453)
PMV BLD AUTO: 12.9 FL (ref 8.1–13.5)
POTASSIUM SERPL-SCNC: 4.6 MMOL/L (ref 3.7–5.3)
RBC # BLD: 4.84 M/UL (ref 3.95–5.11)
SODIUM BLD-SCNC: 143 MMOL/L (ref 135–144)
TOTAL PROTEIN: 7.2 G/DL (ref 6.4–8.3)
TRIGL SERPL-MCNC: 77 MG/DL
TSH SERPL DL<=0.05 MIU/L-ACNC: 3.81 MIU/L (ref 0.3–5)
VITAMIN D 25-HYDROXY: 31.6 NG/ML (ref 30–100)
VLDLC SERPL CALC-MCNC: NORMAL MG/DL (ref 1–30)
WBC # BLD: 9.2 K/UL (ref 3.5–11.3)

## 2018-08-02 PROCEDURE — 73502 X-RAY EXAM HIP UNI 2-3 VIEWS: CPT

## 2018-08-02 PROCEDURE — 72100 X-RAY EXAM L-S SPINE 2/3 VWS: CPT

## 2018-08-03 ENCOUNTER — TELEPHONE (OUTPATIENT)
Dept: FAMILY MEDICINE CLINIC | Age: 48
End: 2018-08-03

## 2018-08-06 ENCOUNTER — HOSPITAL ENCOUNTER (OUTPATIENT)
Dept: PHYSICAL THERAPY | Age: 48
Setting detail: THERAPIES SERIES
Discharge: HOME | End: 2018-08-06
Payer: MEDICARE

## 2018-08-06 ENCOUNTER — TELEPHONE (OUTPATIENT)
Dept: FAMILY MEDICINE CLINIC | Age: 48
End: 2018-08-06

## 2018-08-06 DIAGNOSIS — G89.29 CHRONIC BILATERAL LOW BACK PAIN WITH BILATERAL SCIATICA: Primary | ICD-10-CM

## 2018-08-06 DIAGNOSIS — M51.36 DDD (DEGENERATIVE DISC DISEASE), LUMBAR: ICD-10-CM

## 2018-08-06 DIAGNOSIS — G89.29 CHRONIC HIP PAIN, RIGHT: ICD-10-CM

## 2018-08-06 DIAGNOSIS — M54.41 CHRONIC BILATERAL LOW BACK PAIN WITH BILATERAL SCIATICA: Primary | ICD-10-CM

## 2018-08-06 DIAGNOSIS — M54.42 CHRONIC BILATERAL LOW BACK PAIN WITH BILATERAL SCIATICA: Primary | ICD-10-CM

## 2018-08-06 DIAGNOSIS — M25.551 CHRONIC HIP PAIN, RIGHT: ICD-10-CM

## 2018-08-06 PROCEDURE — 97033 APP MDLTY 1+IONTPHRSIS EA 15: CPT

## 2018-08-06 PROCEDURE — 97110 THERAPEUTIC EXERCISES: CPT

## 2018-08-06 ASSESSMENT — PAIN SCALES - GENERAL: PAINLEVEL_OUTOF10: 10

## 2018-08-06 ASSESSMENT — PAIN DESCRIPTION - DESCRIPTORS: DESCRIPTORS: CONSTANT;STABBING

## 2018-08-06 ASSESSMENT — PAIN DESCRIPTION - ORIENTATION: ORIENTATION: LOWER;LEFT;RIGHT

## 2018-08-06 ASSESSMENT — PAIN DESCRIPTION - DIRECTION: RADIATING_TOWARDS: RIGHT HIP

## 2018-08-06 ASSESSMENT — PAIN DESCRIPTION - FREQUENCY: FREQUENCY: CONTINUOUS

## 2018-08-06 ASSESSMENT — PAIN DESCRIPTION - LOCATION: LOCATION: BACK

## 2018-08-06 ASSESSMENT — PAIN DESCRIPTION - PAIN TYPE: TYPE: CHRONIC PAIN

## 2018-08-06 NOTE — PROGRESS NOTES
Physical Therapy  [address] [facility]   Outpatient Physical Therapy  [address] [facility]  Phone: [phone]  Fax: [phone]  Daily Progress Note    Date: 18    Patient Name: Radha Reyna        MRN: [id number]  Account: [de-identified] : 1970      General Information:  Chart Reviewed: Yes  Patient assessed for rehabilitation services?: Yes  Referring Practitioner: BRET Carmichael CNP  Referral Date : 18  Diagnosis: Chronic (B) low back pain with (B) sciatica   Follows Commands: Within Functional Limits  Onset Date: 18  PT Insurance Information: [place] [employer]  Total # of Visits Approved: 12  Total # of Visits to Date: 3  No Show: 0  Canceled Appointment: 0    Subjective:  Subjective: reports pain very high today due to just getting off work. notes pain across low back, into buttocks and Right hip.  intermittent pain down Right posterior LE     Pain:  Patient Currently in Pain: Yes  Pain Assessment: 0-10  Pain Level: 10  Pain Type: Chronic pain  Pain Location: Back  Pain Orientation: Lower;Left;Right  Pain Radiating Towards: Right Hip  Pain Descriptors: Constant; Stabbing  Pain Frequency: Continuous       Objective:  Exercise 1: Supine Hook lying Posterior Pelvic Tilt 1 sec hold x 10 reps   Exercise 2: Supine Hook lying Posterior Pelvic Tilt/March 1 sec hold x 10 reps   Exercise 3: Supine Hook lying Extremity Flexion 1 sec hold x 10 reps   Exercise 4: Supine Hook lying Combination 1 sec hold x 10 reps   Exercise 5: Supine Hip Flexion 10 reps with each leg  Exercise 9: Standing Pull Downs 10 lbs 10 reps      Iontophoresis: 2.8 mA x 15 minutes to the (R) greater trochanter    Assessment: Body structures, Functions, Activity limitations: Decreased functional mobility ; Decreased ADL status; Decreased ROM; Decreased strength  Treatment Diagnosis: Difficulty Walking   Prognosis: Good  Patient Education: educacted on Iontophoresis and exercises to strengthen core and

## 2018-08-06 NOTE — TELEPHONE ENCOUNTER
Patient called and states Dr Eduardo Gutierrez retired she needs a referral for  Dr Haley Estes for pain management

## 2018-08-08 ENCOUNTER — HOSPITAL ENCOUNTER (OUTPATIENT)
Dept: PHYSICAL THERAPY | Age: 48
Setting detail: THERAPIES SERIES
Discharge: HOME | End: 2018-08-08
Payer: MEDICARE

## 2018-08-08 PROCEDURE — 97033 APP MDLTY 1+IONTPHRSIS EA 15: CPT

## 2018-08-08 PROCEDURE — 97110 THERAPEUTIC EXERCISES: CPT

## 2018-08-08 ASSESSMENT — PAIN DESCRIPTION - ORIENTATION: ORIENTATION: LOWER

## 2018-08-08 ASSESSMENT — PAIN DESCRIPTION - LOCATION: LOCATION: BACK

## 2018-08-08 ASSESSMENT — PAIN SCALES - GENERAL: PAINLEVEL_OUTOF10: 9

## 2018-08-08 ASSESSMENT — PAIN DESCRIPTION - PAIN TYPE: TYPE: CHRONIC PAIN

## 2018-08-08 NOTE — PROGRESS NOTES
Physical Therapy  [address]   Outpatient Physical Therapy  [address] [facility]  Phone: [phone]  Fax: [phone]  Daily Progress Note    Date: 18    Patient Name: Tab Hillman        MRN: [id number]  Account: [de-identified] : 1970      General Information:  Referring Practitioner: BRET Jean CNP  Referral Date : 18  Diagnosis: Chronic (B) low back pain with (B) sciatica   Other (Comment): To See Dr 18  Onset Date: 18  PT Insurance Information: [place] [employer]vantage  Total # of Visits Approved: 12  Total # of Visits to Date: 4  No Show: 0  Canceled Appointment: 0    Subjective:  Subjective: States pain is worsening overall- to see  at the end of this month. Reports poor tolerance to exercise; Notes when walking R hip \"pops\" and intermittently B LE's buckle & go \"dead\"      Pain:  Patient Currently in Pain: Yes  Pain Assessment: 0-10  Pain Level: 9  Pain Type: Chronic pain  Pain Location: Back  Pain Orientation: Lower  Pain Radiating Towards: Sharp pains in L buttock today; B anterior thighs Numb; intermittently numbness down B LE's to feet     Objective:  Exercise 1: Supine Hooklyinh Isometric Hip Adduction (ball Squeeze) 10x5\"  Exercise 2: Supine Hook lying Posterior Pelvic Tilt/March 1 sec hold x 10 reps   Exercise 9: Standing Pull Downs/Rows 10 lbs 10 reps   Exercise 10: Supine Hooklying Hip Abduction with Yellow T-Band 10x     Iontophoresis: 40mA/min=2.5mA x 14' to R greater trochanter with patient sidelying L    Assessment: Body structures, Functions, Activity limitations: Decreased functional mobility ; Decreased ADL status; Decreased ROM; Decreased strength  Treatment Diagnosis: Difficulty Walking   Prognosis: Good  REQUIRES PT FOLLOW UP: Yes  Activity Tolerance: Patient limited by pain  Comments: Poor tolerance this date with exercise- unable to tolerate SLR supine, prone or sidelying due to low back pain- modified program to tolerance and

## 2018-08-13 ENCOUNTER — HOSPITAL ENCOUNTER (OUTPATIENT)
Dept: PHYSICAL THERAPY | Age: 48
Setting detail: THERAPIES SERIES
Discharge: HOME | End: 2018-08-13
Payer: MEDICARE

## 2018-08-13 PROCEDURE — 97110 THERAPEUTIC EXERCISES: CPT

## 2018-08-13 PROCEDURE — G0283 ELEC STIM OTHER THAN WOUND: HCPCS

## 2018-08-13 ASSESSMENT — PAIN DESCRIPTION - ORIENTATION: ORIENTATION: LOWER;LEFT;RIGHT

## 2018-08-13 ASSESSMENT — PAIN DESCRIPTION - PAIN TYPE: TYPE: CHRONIC PAIN

## 2018-08-13 ASSESSMENT — PAIN DESCRIPTION - ONSET: ONSET: UNABLE TO TELL

## 2018-08-13 ASSESSMENT — PAIN DESCRIPTION - LOCATION: LOCATION: BACK

## 2018-08-13 ASSESSMENT — PAIN DESCRIPTION - FREQUENCY: FREQUENCY: CONTINUOUS

## 2018-08-13 ASSESSMENT — PAIN DESCRIPTION - DESCRIPTORS: DESCRIPTORS: CONSTANT;ACHING

## 2018-08-13 ASSESSMENT — PAIN SCALES - GENERAL: PAINLEVEL_OUTOF10: 9

## 2018-08-13 NOTE — PROGRESS NOTES
Physical Therapy  Daily Treatment Note  Date: 2018  Patient Name: Sheryle Pries  MRN: [id number]     :   1970    General  Chart Reviewed: Yes  Response To Previous Treatment: Not applicable  Family / Caregiver Present: No  Referring Practitioner: BRET Mota CNP  PT Visit Information  Onset Date: 18  PT Insurance Information: [place] [employer]vantage  Total # of Visits Approved: 12  Total # of Visits to Date: 5  No Show: 0  Canceled Appointment: 1    Subjective  Pt stated that she felt the ionto she received caused her symptoms to increase/unable to lift her leg later that day. Pain Screening  Patient Currently in Pain: Yes  Pain Assessment  Pain Assessment: 0-10  Pain Level: 9  Pain Type: Chronic pain  Pain Location: Back  Pain Orientation: Lower;Left;Right  Pain Radiating Towards: (L) buttocks, (R) hip   Pain Descriptors: Constant; Aching  Pain Frequency: Continuous  Pain Onset: Unable to tell  Multiple Pain Sites: No     Treatment Activities  Exercises  Exercise 1: Supine Hook lying Isometric Hip Adduction (ball Squeeze) 10x5\"  Exercise 2: Supine Hook lying Posterior Pelvic Tilt/March 1 sec hold x 10 reps   Exercise 3: Supine Hook lying Extremity Flexion 1 sec hold x 10 reps   Exercise 4: Supine Hook lying Combination 1 sec hold x 10 reps   Exercise 5: Supine Hip Flexion 10 reps with each leg  Exercise 7: Prone Hip Extension 10 reps with each leg   Exercise 9: Standing Pull Downs/Rows 10 lbs 10 reps   Exercise 10: Supine Hook lying Hip Abduction with Yellow T-Band 10x    Modalities  Moist heat: 15 minutes to the lumbar region (supine position)  E-stim (parameters): IFC x 15 minutes/intensity per pt tolerance (supine position)    Activity Tolerance  Patient tolerated treatment well    Assessment  Conditions Requiring Skilled Therapeutic Intervention  Body structures, Functions, Activity limitations: Decreased functional mobility ; Decreased ADL status; Decreased ROM; Decreased strength  Assessment: Initiated E-stim/IFC with a hot pack to the lumbar region to assist with pain control.    Treatment Diagnosis: Difficulty Walking   Prognosis: Good    Plan  Times per week: 3  Plan weeks: 4  Current Treatment Recommendations: Strengthening, Home Exercise Program, ROM, Patient/Caregiver Education & Training, Modalities, Pain Management    Therapy Time   Individual Concurrent Group Co-treatment   Time In 3219         Time Out 8118         Minutes 55           Treatment Charges: Minutes Units   []  Ultrasound     [x]  Electrical-Stim 15 1   []  Iontophoresis     []  Traction     []  Massage       []  Eval     []  Gait     [x]  Ther Exercise 35  2    []  Manual Therapy       []  Ther Activities       []  Aquatics     []  Vasopneumatic Device     []  Neuro Re-Ed       [x]  Other: Hot Pack 15  0    Total Treatment Time: 48 3      Ashley Mohan PT DPT

## 2018-08-15 ENCOUNTER — APPOINTMENT (OUTPATIENT)
Dept: PHYSICAL THERAPY | Age: 48
End: 2018-08-15
Payer: MEDICARE

## 2018-08-17 ENCOUNTER — HOSPITAL ENCOUNTER (OUTPATIENT)
Dept: PHYSICAL THERAPY | Age: 48
Setting detail: THERAPIES SERIES
Discharge: HOME | End: 2018-08-17
Payer: MEDICARE

## 2018-08-17 PROCEDURE — 97110 THERAPEUTIC EXERCISES: CPT

## 2018-08-17 PROCEDURE — G0283 ELEC STIM OTHER THAN WOUND: HCPCS

## 2018-08-17 ASSESSMENT — PAIN SCALES - GENERAL: PAINLEVEL_OUTOF10: 8

## 2018-08-17 ASSESSMENT — PAIN DESCRIPTION - PAIN TYPE: TYPE: CHRONIC PAIN

## 2018-08-17 ASSESSMENT — PAIN DESCRIPTION - DESCRIPTORS: DESCRIPTORS: CONSTANT;ACHING

## 2018-08-17 ASSESSMENT — PAIN DESCRIPTION - ONSET: ONSET: UNABLE TO TELL

## 2018-08-17 ASSESSMENT — PAIN DESCRIPTION - LOCATION: LOCATION: BACK

## 2018-08-17 ASSESSMENT — PAIN DESCRIPTION - FREQUENCY: FREQUENCY: CONTINUOUS

## 2018-08-17 ASSESSMENT — PAIN DESCRIPTION - ORIENTATION: ORIENTATION: RIGHT;LEFT;LOWER

## 2018-08-17 NOTE — PROGRESS NOTES
Physical Therapy  Daily Treatment Note  Date: 2018  Patient Name: Bereket Parker  MRN: [id number]     :   1970    General  Chart Reviewed: Yes  Response To Previous Treatment: Not applicable  Family / Caregiver Present: No  Referring Practitioner: BRET Voss CNP  PT Visit Information  Onset Date: 18  PT Insurance Information: [place] Advantage  Total # of Visits Approved: 12  Total # of Visits to Date: 6  No Show: 0  Canceled Appointment: 1    Subjective  Pt stated that her back was extremely painful last night/cause unknown - 10 +. The pain has subsided slightly but it was still extremely painful throughout her work shift today. Pain Screening  Patient Currently in Pain: Yes  Pain Assessment  Pain Assessment: 0-10  Pain Level: 8  Pain Type: Chronic pain  Pain Location: Back  Pain Orientation: Right;Left;Lower  Pain Radiating Towards: (L) buttocks, (R) hip   Pain Descriptors: Constant; Aching  Pain Frequency: Continuous  Pain Onset: Unable to tell  Multiple Pain Sites: No     Treatment Activities  Exercises  Exercise 1: Supine Hook lying Isometric Hip Adduction (ball Squeeze) 10x5\"  Exercise 2: Supine Hook lying Posterior Pelvic Tilt/March 1 sec hold x 10 reps   Exercise 3: Supine Hook lying Extremity Flexion 1 sec hold x 10 reps   Exercise 4: Supine Hook lying Combination 1 sec hold x 10 reps     Modalities  Moist heat: 15 minutes to the lumbar region (Upright position)  E-stim (parameters): IFC x 15 minutes/intensity per pt tolerance (Upright position)    Activity Tolerance  Patient was limited by pain     Assessment  Conditions Requiring Skilled Therapeutic Intervention  Body structures, Functions, Activity limitations: Decreased functional mobility ; Decreased ADL status; Decreased ROM; Decreased strength  Assessment: Unable to perform any the hip exercises today due to increase pain being noted immediately within each plane (B).    Treatment Diagnosis: Difficulty Walking Prognosis: Good    Plan  Times per week: 3  Plan weeks: 4  Current Treatment Recommendations: Strengthening, Home Exercise Program, ROM, Patient/Caregiver Education & Training, Modalities, Pain Management    Therapy Time   Individual Concurrent Group Co-treatment   Time In 7282         Time Out 1700         Minutes 45           Treatment Charges: Minutes Units   []  Ultrasound     [x]  Electrical-Stim 15 1   []  Iontophoresis     []  Traction     []  Massage       []  Eval     []  Gait     [x]  Ther Exercise 15  1    []  Manual Therapy       []  Ther Activities       []  Aquatics     []  Vasopneumatic Device     []  Neuro Re-Ed       [x]  Other: Hot Pack  15  0    Total Treatment Time: 30 2     Mendel Jarred, PT DPT

## 2018-08-21 ENCOUNTER — HOSPITAL ENCOUNTER (OUTPATIENT)
Dept: WOMENS IMAGING | Age: 48
Discharge: HOME | End: 2018-08-23
Payer: MEDICARE

## 2018-08-21 DIAGNOSIS — Z12.39 SCREENING FOR BREAST CANCER: ICD-10-CM

## 2018-08-21 PROCEDURE — 77063 BREAST TOMOSYNTHESIS BI: CPT

## 2018-08-22 ENCOUNTER — HOSPITAL ENCOUNTER (OUTPATIENT)
Dept: PHYSICAL THERAPY | Age: 48
Setting detail: THERAPIES SERIES
Discharge: HOME | End: 2018-08-22
Payer: MEDICARE

## 2018-08-22 DIAGNOSIS — R92.8 ABNORMAL SCREENING MAMMOGRAM: Primary | ICD-10-CM

## 2018-08-22 DIAGNOSIS — N63.10 BREAST MASS, RIGHT: ICD-10-CM

## 2018-08-22 DIAGNOSIS — N63.20 LEFT BREAST MASS: ICD-10-CM

## 2018-08-22 NOTE — PROGRESS NOTES
Physical Therapy  [address]    Outpatient Physical Therapy  Cancel/No Show Note    Date: 18    Patient Name: Milad Escalera        MRN: [id number]  Account: [de-identified] : 1970      General Information:  Referring Practitioner: BRET Wick CNP  Referral Date : 18  Diagnosis: Chronic (B) low back pain with (B) sciatica   Other (Comment): To See  18  Onset Date: 18  PT Insurance Information: [place] Advantage  Total # of Visits Approved: 12  Total # of Visits to Date: 6  No Show: 0  Canceled Appointment: 2        Comments: Patient cancelled appointment today due having to attend to an emergency.     Elizabeth Hernandez, PTA

## 2018-08-29 ENCOUNTER — HOSPITAL ENCOUNTER (OUTPATIENT)
Dept: PHYSICAL THERAPY | Age: 48
Setting detail: THERAPIES SERIES
Discharge: HOME | End: 2018-08-29
Payer: MEDICARE

## 2018-08-29 PROCEDURE — 97110 THERAPEUTIC EXERCISES: CPT

## 2018-08-29 PROCEDURE — G0283 ELEC STIM OTHER THAN WOUND: HCPCS

## 2018-08-29 ASSESSMENT — PAIN DESCRIPTION - FREQUENCY: FREQUENCY: CONTINUOUS

## 2018-08-29 ASSESSMENT — PAIN DESCRIPTION - DESCRIPTORS: DESCRIPTORS: CONSTANT;ACHING

## 2018-08-29 ASSESSMENT — PAIN SCALES - GENERAL: PAINLEVEL_OUTOF10: 9

## 2018-08-29 ASSESSMENT — PAIN DESCRIPTION - LOCATION: LOCATION: BACK

## 2018-08-29 ASSESSMENT — PAIN DESCRIPTION - PAIN TYPE: TYPE: CHRONIC PAIN

## 2018-08-29 ASSESSMENT — PAIN DESCRIPTION - DIRECTION: RADIATING_TOWARDS: (R) HIP

## 2018-08-29 ASSESSMENT — PAIN DESCRIPTION - ORIENTATION: ORIENTATION: RIGHT;LEFT;LOWER

## 2018-08-30 ENCOUNTER — HOSPITAL ENCOUNTER (OUTPATIENT)
Dept: WOMENS IMAGING | Age: 48
Discharge: HOME | End: 2018-09-01
Payer: MEDICARE

## 2018-08-30 DIAGNOSIS — N63.10 BREAST MASS, RIGHT: ICD-10-CM

## 2018-08-30 DIAGNOSIS — N63.20 LEFT BREAST MASS: ICD-10-CM

## 2018-08-30 DIAGNOSIS — R92.8 ABNORMAL SCREENING MAMMOGRAM: ICD-10-CM

## 2018-08-30 PROCEDURE — 76642 ULTRASOUND BREAST LIMITED: CPT

## 2018-08-31 ENCOUNTER — HOSPITAL ENCOUNTER (OUTPATIENT)
Dept: PHYSICAL THERAPY | Age: 48
Setting detail: THERAPIES SERIES
Discharge: HOME | End: 2018-08-31
Payer: MEDICARE

## 2018-08-31 NOTE — PROGRESS NOTES
Physical Therapy  Daily Treatment Note/Cancellation  Date: 2018  Patient Name: Sheryle Pries  MRN: [id number]     :   1970    General  Referring Practitioner: BRET Mota CNP  PT Visit Information  Onset Date: 18  PT Insurance Information: [place] Advantage  Total # of Visits Approved: 12  Total # of Visits to Date: 7  No Show: 0  Canceled Appointment: 3  General Comment  Comments: Patient cancelled her appointment today due to illness.     Modesta Schultz, PT DPT

## 2018-09-27 ENCOUNTER — OFFICE VISIT (OUTPATIENT)
Dept: FAMILY MEDICINE CLINIC | Age: 48
End: 2018-09-27
Payer: MEDICARE

## 2018-09-27 ENCOUNTER — HOSPITAL ENCOUNTER (OUTPATIENT)
Age: 48
Setting detail: SPECIMEN
Discharge: HOME | End: 2018-09-27
Payer: MEDICARE

## 2018-09-27 VITALS
OXYGEN SATURATION: 98 % | TEMPERATURE: 98 F | WEIGHT: 106 LBS | SYSTOLIC BLOOD PRESSURE: 120 MMHG | DIASTOLIC BLOOD PRESSURE: 60 MMHG | HEART RATE: 77 BPM | RESPIRATION RATE: 16 BRPM | BODY MASS INDEX: 19.39 KG/M2

## 2018-09-27 DIAGNOSIS — Z01.419 ENCOUNTER FOR WELL WOMAN EXAM WITH ROUTINE GYNECOLOGICAL EXAM: Primary | ICD-10-CM

## 2018-09-27 DIAGNOSIS — G89.29 CHRONIC HIP PAIN, RIGHT: ICD-10-CM

## 2018-09-27 DIAGNOSIS — D69.6 THROMBOCYTOPENIA (HCC): ICD-10-CM

## 2018-09-27 DIAGNOSIS — M51.36 DDD (DEGENERATIVE DISC DISEASE), LUMBAR: ICD-10-CM

## 2018-09-27 DIAGNOSIS — M54.42 CHRONIC BILATERAL LOW BACK PAIN WITH BILATERAL SCIATICA: ICD-10-CM

## 2018-09-27 DIAGNOSIS — N95.1 HOT FLASHES DUE TO MENOPAUSE: ICD-10-CM

## 2018-09-27 DIAGNOSIS — M54.41 CHRONIC BILATERAL LOW BACK PAIN WITH BILATERAL SCIATICA: ICD-10-CM

## 2018-09-27 DIAGNOSIS — G89.29 CHRONIC BILATERAL LOW BACK PAIN WITH BILATERAL SCIATICA: ICD-10-CM

## 2018-09-27 DIAGNOSIS — M25.551 CHRONIC HIP PAIN, RIGHT: ICD-10-CM

## 2018-09-27 DIAGNOSIS — R74.01 ELEVATED ALT MEASUREMENT: ICD-10-CM

## 2018-09-27 PROCEDURE — 99396 PREV VISIT EST AGE 40-64: CPT | Performed by: NURSE PRACTITIONER

## 2018-09-27 RX ORDER — GABAPENTIN 100 MG/1
100 CAPSULE ORAL 3 TIMES DAILY
Refills: 0 | COMMUNITY
Start: 2018-08-30 | End: 2020-08-13 | Stop reason: ALTCHOICE

## 2018-09-27 RX ORDER — VENLAFAXINE HYDROCHLORIDE 37.5 MG/1
37.5 CAPSULE, EXTENDED RELEASE ORAL DAILY
Qty: 30 CAPSULE | Refills: 1 | Status: SHIPPED | OUTPATIENT
Start: 2018-09-27 | End: 2019-10-17 | Stop reason: ALTCHOICE

## 2018-09-27 RX ORDER — TIZANIDINE 4 MG/1
4 TABLET ORAL DAILY
Refills: 0 | COMMUNITY
Start: 2018-08-30 | End: 2019-10-17 | Stop reason: ALTCHOICE

## 2018-09-27 ASSESSMENT — ENCOUNTER SYMPTOMS
BACK PAIN: 1
GASTROINTESTINAL NEGATIVE: 1

## 2018-09-27 NOTE — PROGRESS NOTES
Visit Information    Have you changed or started any medications since your last visit including any over-the-counter medicines, vitamins, or herbal medicines? no   Have you stopped taking any of your medications? Is so, why? -  no  Are you having any side effects from any of your medications? - no    Have you seen any other physician or provider since your last visit? yes -    Have you had any other diagnostic tests since your last visit? yes -    Have you been seen in the emergency room and/or had an admission in a hospital since we last saw you?  no   Have you had your routine dental cleaning in the past 6 months?  no     Do you have an active MyChart account? If no, what is the barrier?   No:     Patient Care Team:  BRET Bourne CNP as PCP - General (Nurse Practitioner Family)    Medical History Review  Past Medical, Family, and Social History reviewed and does not contribute to the patient presenting condition    Health Maintenance   Topic Date Due    Cervical cancer screen  09/27/1991    Flu vaccine (1) 09/01/2018    DTaP/Tdap/Td vaccine (1 - Tdap) 07/18/2019 (Originally 9/27/1989)    A1C test (Diabetic or Prediabetic)  08/02/2019    Lipid screen  08/02/2023    Pneumococcal med risk  Completed    HIV screen  Completed
Musculoskeletal: Positive for arthralgias and back pain. Objective:     /60   Pulse 77   Temp 98 °F (36.7 °C)   Resp 16   Wt 106 lb (48.1 kg)   SpO2 98%   BMI 19.39 kg/m²   Physical Exam   Constitutional: She is oriented to person, place, and time. She appears well-developed and well-nourished. HENT:   Head: Normocephalic and atraumatic. Right Ear: External ear normal.   Left Ear: External ear normal.   Nose: Nose normal.   Eyes: Conjunctivae are normal.   Neck: Normal range of motion. Neck supple. No thyromegaly present. Cardiovascular: Normal rate, regular rhythm and normal heart sounds. Exam reveals no gallop and no friction rub. No murmur heard. Pulmonary/Chest: Effort normal and breath sounds normal. No respiratory distress. She has no wheezes. She has no rales. She exhibits no tenderness. Right breast exhibits no inverted nipple, no mass, no nipple discharge, no skin change and no tenderness. Left breast exhibits no inverted nipple, no mass, no nipple discharge, no skin change and no tenderness. Breasts are symmetrical.   Breasts: breasts appear normal, no suspicious masses, no skin or nipple changes or axillary nodes, no axillary lymphadenopathy, unchanged from previous exams. Abdominal: Soft. Normal appearance and bowel sounds are normal. There is no tenderness. Genitourinary: No breast swelling, tenderness, discharge or bleeding. There is no rash, tenderness, lesion or injury on the right labia. There is no rash, tenderness, lesion or injury on the left labia. Right adnexum displays no mass, no tenderness and no fullness. Left adnexum displays no mass, no tenderness and no fullness. No erythema, tenderness or bleeding in the vagina. No foreign body in the vagina. No signs of injury around the vagina. No vaginal discharge found. Genitourinary Comments: Uterus and cervix surgically absent. Musculoskeletal: Normal range of motion.         Right hip: She exhibits tenderness

## 2018-09-29 LAB
HPV SAMPLE: NORMAL
HPV SOURCE: NORMAL
HPV, GENOTYPE 16: NOT DETECTED
HPV, GENOTYPE 18: NOT DETECTED
HPV, HIGH RISK OTHER: NOT DETECTED
HPV, INTERPRETATION: NORMAL

## 2018-10-09 ENCOUNTER — HOSPITAL ENCOUNTER (OUTPATIENT)
Dept: MRI IMAGING | Age: 48
Discharge: HOME | End: 2018-10-11
Payer: MEDICARE

## 2018-10-09 DIAGNOSIS — M54.41 CHRONIC BILATERAL LOW BACK PAIN WITH BILATERAL SCIATICA: ICD-10-CM

## 2018-10-09 DIAGNOSIS — G89.29 CHRONIC BILATERAL LOW BACK PAIN WITH BILATERAL SCIATICA: ICD-10-CM

## 2018-10-09 DIAGNOSIS — G89.29 CHRONIC HIP PAIN, RIGHT: ICD-10-CM

## 2018-10-09 DIAGNOSIS — M51.36 DDD (DEGENERATIVE DISC DISEASE), LUMBAR: ICD-10-CM

## 2018-10-09 DIAGNOSIS — M54.42 CHRONIC BILATERAL LOW BACK PAIN WITH BILATERAL SCIATICA: ICD-10-CM

## 2018-10-09 DIAGNOSIS — M25.551 CHRONIC HIP PAIN, RIGHT: ICD-10-CM

## 2018-10-09 PROCEDURE — 73721 MRI JNT OF LWR EXTRE W/O DYE: CPT

## 2018-10-09 PROCEDURE — 72148 MRI LUMBAR SPINE W/O DYE: CPT

## 2018-10-10 DIAGNOSIS — G89.29 CHRONIC BILATERAL LOW BACK PAIN WITH BILATERAL SCIATICA: Primary | ICD-10-CM

## 2018-10-10 DIAGNOSIS — M51.36 DDD (DEGENERATIVE DISC DISEASE), LUMBAR: ICD-10-CM

## 2018-10-10 DIAGNOSIS — G89.29 CHRONIC HIP PAIN, RIGHT: ICD-10-CM

## 2018-10-10 DIAGNOSIS — M25.551 CHRONIC HIP PAIN, RIGHT: ICD-10-CM

## 2018-10-10 DIAGNOSIS — M54.41 CHRONIC BILATERAL LOW BACK PAIN WITH BILATERAL SCIATICA: Primary | ICD-10-CM

## 2018-10-10 DIAGNOSIS — M54.42 CHRONIC BILATERAL LOW BACK PAIN WITH BILATERAL SCIATICA: Primary | ICD-10-CM

## 2018-10-10 DIAGNOSIS — M67.959 TENDINOPATHY OF GLUTEAL REGION: ICD-10-CM

## 2018-10-12 ENCOUNTER — TELEPHONE (OUTPATIENT)
Dept: FAMILY MEDICINE CLINIC | Age: 48
End: 2018-10-12

## 2018-10-12 DIAGNOSIS — A59.9 INFECTION DUE TO TRICHOMONAS: Primary | ICD-10-CM

## 2018-10-12 RX ORDER — METRONIDAZOLE 500 MG/1
2000 TABLET ORAL DAILY
Qty: 4 TABLET | Refills: 0 | Status: SHIPPED | OUTPATIENT
Start: 2018-10-12 | End: 2018-10-13

## 2018-10-12 NOTE — TELEPHONE ENCOUNTER
Spoke to pt- + trichomonas. Will start Flagyl 2,000 mg x1. Pt asymptomatic, states sexual partner is in [facility] for the past 6 months, advised tx for them as well. Encouraged abstinence for 1 week and condom use after. Also discussed MRI of hips and spine, will f/u with Ortho.

## 2018-10-13 LAB — CYTOLOGY REPORT: NORMAL

## 2018-10-18 ENCOUNTER — TELEPHONE (OUTPATIENT)
Dept: FAMILY MEDICINE CLINIC | Age: 48
End: 2018-10-18

## 2018-10-18 DIAGNOSIS — G89.29 CHRONIC HIP PAIN, RIGHT: Primary | ICD-10-CM

## 2018-10-18 DIAGNOSIS — M25.551 CHRONIC HIP PAIN, RIGHT: Primary | ICD-10-CM

## 2018-10-23 ENCOUNTER — TELEPHONE (OUTPATIENT)
Dept: FAMILY MEDICINE CLINIC | Age: 48
End: 2018-10-23

## 2018-12-06 ENCOUNTER — HOSPITAL ENCOUNTER (OUTPATIENT)
Age: 48
Setting detail: SPECIMEN
Discharge: HOME | End: 2018-12-06
Payer: MEDICARE

## 2018-12-06 ENCOUNTER — OFFICE VISIT (OUTPATIENT)
Dept: FAMILY MEDICINE CLINIC | Age: 48
End: 2018-12-06
Payer: MEDICARE

## 2018-12-06 VITALS
WEIGHT: 106 LBS | TEMPERATURE: 96.6 F | HEIGHT: 62 IN | HEART RATE: 82 BPM | DIASTOLIC BLOOD PRESSURE: 70 MMHG | SYSTOLIC BLOOD PRESSURE: 112 MMHG | BODY MASS INDEX: 19.51 KG/M2 | OXYGEN SATURATION: 97 %

## 2018-12-06 DIAGNOSIS — G89.29 CHRONIC BILATERAL LOW BACK PAIN WITH BILATERAL SCIATICA: Primary | ICD-10-CM

## 2018-12-06 DIAGNOSIS — M54.41 CHRONIC BILATERAL LOW BACK PAIN WITH BILATERAL SCIATICA: Primary | ICD-10-CM

## 2018-12-06 DIAGNOSIS — M25.551 CHRONIC HIP PAIN, RIGHT: ICD-10-CM

## 2018-12-06 DIAGNOSIS — M51.36 DDD (DEGENERATIVE DISC DISEASE), LUMBAR: ICD-10-CM

## 2018-12-06 DIAGNOSIS — R35.0 URINARY FREQUENCY: ICD-10-CM

## 2018-12-06 DIAGNOSIS — R74.01 ELEVATED ALT MEASUREMENT: ICD-10-CM

## 2018-12-06 DIAGNOSIS — M54.42 CHRONIC BILATERAL LOW BACK PAIN WITH BILATERAL SCIATICA: Primary | ICD-10-CM

## 2018-12-06 DIAGNOSIS — Z72.0 TOBACCO USER: ICD-10-CM

## 2018-12-06 DIAGNOSIS — Z86.19 HX OF TRICHOMONIASIS: ICD-10-CM

## 2018-12-06 DIAGNOSIS — Z45.89 TYMPANOSTOMY TUBE CHECK: ICD-10-CM

## 2018-12-06 DIAGNOSIS — G89.29 CHRONIC HIP PAIN, RIGHT: ICD-10-CM

## 2018-12-06 DIAGNOSIS — D69.6 THROMBOCYTOPENIA (HCC): ICD-10-CM

## 2018-12-06 DIAGNOSIS — M67.959 TENDINOPATHY OF GLUTEAL REGION: ICD-10-CM

## 2018-12-06 PROBLEM — R92.8 ABNORMAL SCREENING MAMMOGRAM: Status: RESOLVED | Noted: 2018-08-22 | Resolved: 2018-12-06

## 2018-12-06 LAB
BILIRUBIN, POC: NEGATIVE
BLOOD URINE, POC: NEGATIVE
CLARITY, POC: CLEAR
COLOR, POC: YELLOW
DIRECT EXAM: NORMAL
DIRECT EXAM: NORMAL
GLUCOSE URINE, POC: NEGATIVE
KETONES, POC: NEGATIVE
LEUKOCYTE EST, POC: NEGATIVE
Lab: NORMAL
NITRITE, POC: NEGATIVE
PH, POC: 7
PROTEIN, POC: NEGATIVE
SPECIFIC GRAVITY, POC: 1.01
SPECIMEN DESCRIPTION: NORMAL
STATUS: NORMAL
UROBILINOGEN, POC: NORMAL

## 2018-12-06 PROCEDURE — 99214 OFFICE O/P EST MOD 30 MIN: CPT | Performed by: NURSE PRACTITIONER

## 2018-12-06 PROCEDURE — 4004F PT TOBACCO SCREEN RCVD TLK: CPT | Performed by: NURSE PRACTITIONER

## 2018-12-06 PROCEDURE — G8420 CALC BMI NORM PARAMETERS: HCPCS | Performed by: NURSE PRACTITIONER

## 2018-12-06 PROCEDURE — G8427 DOCREV CUR MEDS BY ELIG CLIN: HCPCS | Performed by: NURSE PRACTITIONER

## 2018-12-06 PROCEDURE — G8484 FLU IMMUNIZE NO ADMIN: HCPCS | Performed by: NURSE PRACTITIONER

## 2018-12-06 ASSESSMENT — ENCOUNTER SYMPTOMS: BACK PAIN: 1

## 2018-12-06 NOTE — PROGRESS NOTES
Ria Ji. Jose Raul Callesjaclyn, APRN-[facility]  [address] MEDICINE  [address], [address]  [facility] [address]  Dept: [phone]  Dept Fax: [phone]      HPI:     Flora Parks is a 50 y.o. female who presents today for her medical conditions/complaintsas noted below. Flora Parks is c/o of Back Pain (2 month follow up. ..also Right Hip pain  ) and Results (MRI on back/hips)      HPI  Problem Focused Follow-Up  Last office visit date: 9-27-18  Dx: Chronic low back pain w/bilateral sciatica and chronic right hip pain  Tx: MRI's ordered, referred to Ortho- she has not yet been able to make an appointment. Update: Following with pain management- Dr. Felix Govea- had 3 steroid injections, which she states did not help. She is going to have a nerve block completed this coming Tuesday. Dr. Elizabeth Arnold did review patient's MRI's. Was told if the nerve block does not work, she may need to have surgery on her back. She does rate back and hip pain a 9 on 0-10 pain scale, does seem to be getting worse. She states that she could not walk her pain was so bad. States that sometimes b/l legs go numb, but is not all of the time, she does describe stabbing and burning pain at the same time. She is on gabapentin and Zanaflex for pain, she also takes Naproxen PRN. She was following for PT, will start going back to PT after injections. No loss of bowel or bladder. Additionally, she states she was afraid to start the Effexor for hot flashes, but she is planning on starting the medication.    Narrative   EXAMINATION:   MRI OF THE RIGHT HIP WITHOUT CONTRAST, 10/9/2018 5:33 pm       TECHNIQUE:   Multiplanar multisequence MRI of the right hip was performed without the   administration of intravenous contrast.       COMPARISON:   None.       HISTORY:   ORDERING SYSTEM PROVIDED HISTORY: Chronic hip pain, right   TECHNOLOGIST PROVIDED HISTORY:   Chronic pain, worse with PT, no injury   Ordering Physician daily.. 0    tiZANidine (ZANAFLEX) 4 MG tablet Take 4 mg by mouth daily  0    venlafaxine (EFFEXOR XR) 37.5 MG extended release capsule Take 1 capsule by mouth daily 30 capsule 1    VENTOLIN  (90 Base) MCG/ACT inhaler inhale 2 puffs by mouth every 4 hours  1    STIOLTO RESPIMAT 2.5-2.5 MCG/ACT AERS   1    naproxen (NAPROSYN) 500 MG tablet Take 1 tablet by mouth 2 times daily as needed for Pain 60 tablet 1     No current facility-administered medications for this visit. No Known Allergies    Health Maintenance   Topic Date Due    DTaP/Tdap/Td vaccine (1 - Tdap) 07/18/2019 (Originally 9/27/1989)    Flu vaccine (1) 12/06/2019 (Originally 9/1/2018)    A1C test (Diabetic or Prediabetic)  08/02/2019    Lipid screen  08/02/2023    Cervical cancer screen  09/27/2023    Pneumococcal med risk  Completed    HIV screen  Completed       Subjective:     Review of Systems   Constitutional: Negative. Genitourinary: Positive for frequency. Negative for dysuria and vaginal discharge. Musculoskeletal: Positive for arthralgias and back pain. Neurological: Positive for numbness. Objective:     /70 (Site: Right Upper Arm, Position: Sitting, Cuff Size: Small Adult)   Pulse 82   Temp 96.6 °F (35.9 °C) (Tympanic)   Ht 5' 2.01\" (1.575 m)   Wt 106 lb (48.1 kg)   SpO2 97%   BMI 19.38 kg/m²   Physical Exam   Constitutional: She is oriented to person, place, and time. She appears well-developed and well-nourished. Non-toxic appearance. She does not have a sickly appearance. HENT:   Head: Normocephalic and atraumatic. Right Ear: External ear normal. A foreign body (+ tympanostomy tube to right TM) is present. Left Ear: Tympanic membrane, external ear and ear canal normal.   Eyes: Conjunctivae are normal.   Neck: Normal range of motion. Cardiovascular: Normal rate, regular rhythm and normal heart sounds. Exam reveals no gallop and no friction rub. No murmur heard.   Pulmonary/Chest: per pain management  -Will refer again to Ortho since she has not heard back from Dr. Anjana Mcgregor office  -Re-start PT  -Will refer for further evaluation and possible treatment:  - Funmi Trevino MD, [address]*    3. Chronic hip pain, right  -No change:  -Con't f/u and plan per pain management  -Will refer again to Ortho since she has not heard back from Dr. Anjana Mcgregor office  -Re-start PT  -Will refer for further evaluation and possible treatment:  - Funmi Trevino MD, [address]*    4. Tendinopathy of gluteal region  -No change:  -Con't f/u and plan per pain management  -Will refer again to Ortho since she has not heard back from Dr. Anjana Mcgregor office  -Re-start PT  -Will refer for further evaluation and possible treatment:  - Funmi Trevino MD, [address]*    5. Elevated ALT measurement  -Previously ordered labs re-printed and encouraged patient to have completed to re-evaluate     6. Thrombocytopenia ([address]  -Previously ordered labs re-printed and encouraged patient to have completed to re-evaluate     7. Urinary frequency  -Negative UA, but will send for culture and ensure resolution of trichomonas   - POCT Urinalysis no Micro  - Trichomonas Screen; Future  - Urine Culture; Future    8. Tympanostomy tube check  -Referral to Dr. Nila Young re-printed for patient to complete    9. Hx of trichomoniasis  -Re-check urine post tx (completed ATB's as ordered):  - Trichomonas Screen; Future    10. Tobacco user  -Smoking cessation discussed and encouraged    Return in about 3 months (around 3/6/2019) for Hip/back pain. Discussed use, benefit, and side effects of prescribed medications. Barriers to medication compliance addressed. All patient questions answered. Patient voiced understanding.

## 2018-12-07 ENCOUNTER — TELEPHONE (OUTPATIENT)
Dept: ORTHOPEDIC SURGERY | Age: 48
End: 2018-12-07

## 2019-09-09 ENCOUNTER — HOSPITAL ENCOUNTER (EMERGENCY)
Age: 49
Discharge: HOME | End: 2019-09-09
Attending: EMERGENCY MEDICINE
Payer: COMMERCIAL

## 2019-09-09 VITALS
TEMPERATURE: 99.2 F | OXYGEN SATURATION: 95 % | DIASTOLIC BLOOD PRESSURE: 77 MMHG | SYSTOLIC BLOOD PRESSURE: 130 MMHG | HEART RATE: 99 BPM | RESPIRATION RATE: 15 BRPM | BODY MASS INDEX: 19.84 KG/M2 | WEIGHT: 112 LBS | HEIGHT: 63 IN

## 2019-09-09 DIAGNOSIS — N30.00 ACUTE CYSTITIS WITHOUT HEMATURIA: Primary | ICD-10-CM

## 2019-09-09 LAB
-: ABNORMAL
AMORPHOUS: ABNORMAL
BACTERIA: ABNORMAL
BILIRUBIN URINE: NEGATIVE
CASTS UA: ABNORMAL /LPF
COLOR: ABNORMAL
COMMENT UA: ABNORMAL
CRYSTALS, UA: ABNORMAL /HPF
EPITHELIAL CELLS UA: ABNORMAL /HPF
GLUCOSE URINE: NEGATIVE
KETONES, URINE: ABNORMAL
LEUKOCYTE ESTERASE, URINE: ABNORMAL
MUCUS: ABNORMAL
NITRITE, URINE: POSITIVE
OTHER OBSERVATIONS UA: ABNORMAL
PH UA: 6.5 (ref 5–8)
PROTEIN UA: ABNORMAL
RBC UA: ABNORMAL /HPF
RENAL EPITHELIAL, UA: ABNORMAL /HPF
SPECIFIC GRAVITY UA: 1.01 (ref 1–1.03)
TRICHOMONAS: ABNORMAL
TURBIDITY: ABNORMAL
URINE HGB: ABNORMAL
UROBILINOGEN, URINE: ABNORMAL
WBC UA: ABNORMAL /HPF
YEAST: ABNORMAL

## 2019-09-09 PROCEDURE — 87186 SC STD MICRODIL/AGAR DIL: CPT

## 2019-09-09 PROCEDURE — 87086 URINE CULTURE/COLONY COUNT: CPT

## 2019-09-09 PROCEDURE — 81001 URINALYSIS AUTO W/SCOPE: CPT

## 2019-09-09 PROCEDURE — 87088 URINE BACTERIA CULTURE: CPT

## 2019-09-09 PROCEDURE — 6370000000 HC RX 637 (ALT 250 FOR IP): Performed by: EMERGENCY MEDICINE

## 2019-09-09 PROCEDURE — 99284 EMERGENCY DEPT VISIT MOD MDM: CPT

## 2019-09-09 RX ORDER — ACETAMINOPHEN 325 MG/1
650 TABLET ORAL EVERY 4 HOURS PRN
Qty: 30 TABLET | Refills: 3 | Status: SHIPPED | OUTPATIENT
Start: 2019-09-09 | End: 2019-10-17 | Stop reason: ALTCHOICE

## 2019-09-09 RX ORDER — CEPHALEXIN 250 MG/1
500 CAPSULE ORAL ONCE
Status: COMPLETED | OUTPATIENT
Start: 2019-09-09 | End: 2019-09-09

## 2019-09-09 RX ORDER — CEPHALEXIN 500 MG/1
500 CAPSULE ORAL 4 TIMES DAILY
Qty: 12 CAPSULE | Refills: 0 | Status: SHIPPED | OUTPATIENT
Start: 2019-09-09 | End: 2019-09-14

## 2019-09-09 RX ORDER — BUTALBITAL, ACETAMINOPHEN AND CAFFEINE 50; 325; 40 MG/1; MG/1; MG/1
1 TABLET ORAL ONCE
Status: COMPLETED | OUTPATIENT
Start: 2019-09-09 | End: 2019-09-09

## 2019-09-09 RX ORDER — BUTALBITAL, ASPIRIN, AND CAFFEINE 325; 50; 40 MG/1; MG/1; MG/1
1 CAPSULE ORAL EVERY 4 HOURS PRN
Qty: 20 CAPSULE | Refills: 0 | Status: SHIPPED | OUTPATIENT
Start: 2019-09-09 | End: 2019-10-17 | Stop reason: ALTCHOICE

## 2019-09-09 RX ORDER — PHENAZOPYRIDINE HYDROCHLORIDE 200 MG/1
200 TABLET, FILM COATED ORAL 3 TIMES DAILY PRN
Qty: 6 TABLET | Refills: 0 | Status: SHIPPED | OUTPATIENT
Start: 2019-09-09 | End: 2019-09-12

## 2019-09-09 RX ADMIN — BUTALBITAL, ACETAMINOPHEN AND CAFFEINE 1 TABLET: 50; 325; 40 TABLET ORAL at 10:58

## 2019-09-09 RX ADMIN — CEPHALEXIN 500 MG: 250 CAPSULE ORAL at 10:49

## 2019-09-09 ASSESSMENT — PAIN DESCRIPTION - LOCATION: LOCATION: GENERALIZED;HEAD

## 2019-09-09 ASSESSMENT — PAIN SCALES - GENERAL
PAINLEVEL_OUTOF10: 8
PAINLEVEL_OUTOF10: 8

## 2019-09-09 ASSESSMENT — PAIN DESCRIPTION - PAIN TYPE: TYPE: ACUTE PAIN

## 2019-09-09 NOTE — ED PROVIDER NOTES
[address]  eMERGENCY dEPARTMENT eNCOUnter      Pt Name: Ashley Farfan  MRN: [id number]  [place] 1970  Date of evaluation: 9/9/19      CHIEF COMPLAINT       Chief Complaint   Patient presents with    Generalized Body Aches    Nausea    Headache    Dysuria     PT TAKING aZO SINCE fRIDAY         HISTORY OF PRESENT ILLNESS   HPI 50 y.o. female presents with complaints of body aches nausea and headache. Symptoms have been persistent over the last 3 days. Symptoms are rated as severe in intensity, constant in duration. No clear provocative or palliative factors. Patient also reports that she has been having burning with urination over the same timeframe. No flank pain. No upper back pain. No vomiting. No fevers. Headache is frontal  in location, throbbing in character. No acute onset. No thunderclap. Not the worst headache of her life. REVIEW OF SYSTEMS     Review of Systems   Constitutional: Positive for fatigue. HENT: Negative for congestion. Eyes: Negative for visual disturbance. Respiratory: Negative for cough and shortness of breath. Cardiovascular: Negative for leg swelling. Gastrointestinal: Negative for abdominal pain and vomiting. Genitourinary: Positive for dysuria and frequency. Musculoskeletal: Positive for myalgias. Skin: Negative for rash. Neurological: Positive for headaches. Hematological: Negative for adenopathy.      PAST MEDICAL HISTORY     Past Medical History:   Diagnosis Date    COPD (chronic obstructive pulmonary disease) ([address]     Former smoker     MI, old        SURGICAL HISTORY       Past Surgical History:   Procedure Laterality Date    HYSTERECTOMY      TYMPANOSTOMY TUBE PLACEMENT      About 3 years ago per patient- b/l       CURRENT MEDICATIONS       Discharge Medication List as of 9/9/2019 11:03 AM      CONTINUE these medications which have NOT CHANGED    Details   gabapentin (NEURONTIN) 100 MG capsule Take 100 mg by mouth 3 times

## 2019-09-10 LAB
CULTURE: ABNORMAL
Lab: ABNORMAL
SPECIMEN DESCRIPTION: ABNORMAL

## 2019-09-10 ASSESSMENT — ENCOUNTER SYMPTOMS
ABDOMINAL PAIN: 0
SHORTNESS OF BREATH: 0
COUGH: 0
VOMITING: 0

## 2019-09-16 ENCOUNTER — CARE COORDINATION (OUTPATIENT)
Dept: OTHER | Facility: CLINIC | Age: 49
End: 2019-09-16

## 2019-09-27 ENCOUNTER — CARE COORDINATION (OUTPATIENT)
Dept: OTHER | Facility: CLINIC | Age: 49
End: 2019-09-27

## 2019-10-16 ENCOUNTER — TELEPHONE (OUTPATIENT)
Dept: FAMILY MEDICINE CLINIC | Age: 49
End: 2019-10-16

## 2019-10-17 ENCOUNTER — OFFICE VISIT (OUTPATIENT)
Dept: FAMILY MEDICINE CLINIC | Age: 49
End: 2019-10-17
Payer: COMMERCIAL

## 2019-10-17 VITALS
WEIGHT: 111 LBS | BODY MASS INDEX: 19.66 KG/M2 | OXYGEN SATURATION: 97 % | SYSTOLIC BLOOD PRESSURE: 118 MMHG | RESPIRATION RATE: 16 BRPM | TEMPERATURE: 96.8 F | HEART RATE: 86 BPM | DIASTOLIC BLOOD PRESSURE: 82 MMHG

## 2019-10-17 DIAGNOSIS — R42 VERTIGO: Primary | ICD-10-CM

## 2019-10-17 DIAGNOSIS — D69.6 THROMBOCYTOPENIA (HCC): ICD-10-CM

## 2019-10-17 DIAGNOSIS — J44.9 CHRONIC OBSTRUCTIVE PULMONARY DISEASE, UNSPECIFIED COPD TYPE (HCC): ICD-10-CM

## 2019-10-17 DIAGNOSIS — Z45.89 TYMPANOSTOMY TUBE CHECK: ICD-10-CM

## 2019-10-17 DIAGNOSIS — R74.01 ELEVATED ALT MEASUREMENT: ICD-10-CM

## 2019-10-17 DIAGNOSIS — Z00.00 PREVENTATIVE HEALTH CARE: ICD-10-CM

## 2019-10-17 PROCEDURE — 99214 OFFICE O/P EST MOD 30 MIN: CPT | Performed by: NURSE PRACTITIONER

## 2019-10-17 RX ORDER — MECLIZINE HYDROCHLORIDE 25 MG/1
25 TABLET ORAL 3 TIMES DAILY PRN
Qty: 30 TABLET | Refills: 0 | Status: SHIPPED | OUTPATIENT
Start: 2019-10-17 | End: 2019-11-01 | Stop reason: SDUPTHER

## 2019-10-17 RX ORDER — PREDNISONE 10 MG/1
TABLET ORAL
Qty: 18 TABLET | Refills: 0 | Status: SHIPPED | OUTPATIENT
Start: 2019-10-17 | End: 2019-10-26

## 2019-10-17 ASSESSMENT — PATIENT HEALTH QUESTIONNAIRE - PHQ9
1. LITTLE INTEREST OR PLEASURE IN DOING THINGS: 0
2. FEELING DOWN, DEPRESSED OR HOPELESS: 0
SUM OF ALL RESPONSES TO PHQ QUESTIONS 1-9: 0
SUM OF ALL RESPONSES TO PHQ QUESTIONS 1-9: 0
SUM OF ALL RESPONSES TO PHQ9 QUESTIONS 1 & 2: 0

## 2019-10-17 ASSESSMENT — ENCOUNTER SYMPTOMS
VISUAL CHANGE: 0
COUGH: 1
VOMITING: 0
SHORTNESS OF BREATH: 1
NAUSEA: 1

## 2019-10-18 ENCOUNTER — TELEPHONE (OUTPATIENT)
Dept: FAMILY MEDICINE CLINIC | Age: 49
End: 2019-10-18

## 2019-10-18 DIAGNOSIS — J44.9 CHRONIC OBSTRUCTIVE PULMONARY DISEASE, UNSPECIFIED COPD TYPE (HCC): Primary | ICD-10-CM

## 2019-10-21 ENCOUNTER — HOSPITAL ENCOUNTER (OUTPATIENT)
Age: 49
Discharge: HOME | End: 2019-10-21
Payer: COMMERCIAL

## 2019-10-21 DIAGNOSIS — R42 VERTIGO: ICD-10-CM

## 2019-10-21 DIAGNOSIS — Z00.00 PREVENTATIVE HEALTH CARE: ICD-10-CM

## 2019-10-21 DIAGNOSIS — R74.01 ELEVATED ALT MEASUREMENT: ICD-10-CM

## 2019-10-21 DIAGNOSIS — D69.6 THROMBOCYTOPENIA (HCC): ICD-10-CM

## 2019-10-21 LAB
-: ABNORMAL
ALBUMIN SERPL-MCNC: 3.4 G/DL (ref 3.5–5.2)
ALBUMIN/GLOBULIN RATIO: ABNORMAL (ref 1–2.5)
ALP BLD-CCNC: 75 U/L (ref 35–104)
ALT SERPL-CCNC: 9 U/L (ref 5–33)
AMORPHOUS: ABNORMAL
ANION GAP SERPL CALCULATED.3IONS-SCNC: 13 MMOL/L (ref 9–17)
AST SERPL-CCNC: 10 U/L
BACTERIA: ABNORMAL
BILIRUB SERPL-MCNC: 0.38 MG/DL (ref 0.3–1.2)
BILIRUBIN URINE: NEGATIVE
BUN BLDV-MCNC: 8 MG/DL (ref 6–20)
BUN/CREAT BLD: ABNORMAL (ref 9–20)
CALCIUM SERPL-MCNC: 9 MG/DL (ref 8.6–10.4)
CASTS UA: ABNORMAL /LPF
CHLORIDE BLD-SCNC: 105 MMOL/L (ref 98–107)
CHOLESTEROL/HDL RATIO: 2.4
CHOLESTEROL: 107 MG/DL
CO2: 25 MMOL/L (ref 20–31)
COLOR: YELLOW
COMMENT UA: ABNORMAL
CREAT SERPL-MCNC: 0.9 MG/DL (ref 0.5–0.9)
CRYSTALS, UA: ABNORMAL /HPF
EPITHELIAL CELLS UA: ABNORMAL /HPF
ESTIMATED AVERAGE GLUCOSE: 120 MG/DL
GFR AFRICAN AMERICAN: >60 ML/MIN
GFR NON-AFRICAN AMERICAN: >60 ML/MIN
GFR SERPL CREATININE-BSD FRML MDRD: ABNORMAL ML/MIN/{1.73_M2}
GFR SERPL CREATININE-BSD FRML MDRD: ABNORMAL ML/MIN/{1.73_M2}
GLUCOSE BLD-MCNC: 115 MG/DL (ref 70–99)
GLUCOSE URINE: NEGATIVE
HBA1C MFR BLD: 5.8 % (ref 4–6)
HCT VFR BLD CALC: 37.3 % (ref 36–46)
HDLC SERPL-MCNC: 44 MG/DL
HEMOGLOBIN: 12.3 G/DL (ref 12–16)
KETONES, URINE: NEGATIVE
LDL CHOLESTEROL: 44 MG/DL (ref 0–130)
LEUKOCYTE ESTERASE, URINE: ABNORMAL
MCH RBC QN AUTO: 29 PG (ref 26–34)
MCHC RBC AUTO-ENTMCNC: 33 G/DL (ref 31–37)
MCV RBC AUTO: 88 FL (ref 80–100)
MUCUS: ABNORMAL
NITRITE, URINE: NEGATIVE
NRBC AUTOMATED: ABNORMAL PER 100 WBC
OTHER OBSERVATIONS UA: ABNORMAL
PDW BLD-RTO: 14.8 % (ref 11.5–14.9)
PH UA: 6.5 (ref 5–8)
PLATELET # BLD: 233 K/UL (ref 150–450)
PMV BLD AUTO: 9.2 FL (ref 6–12)
POTASSIUM SERPL-SCNC: 4 MMOL/L (ref 3.7–5.3)
PROTEIN UA: ABNORMAL
RBC # BLD: 4.24 M/UL (ref 4–5.2)
RBC UA: ABNORMAL /HPF
RENAL EPITHELIAL, UA: ABNORMAL /HPF
SODIUM BLD-SCNC: 143 MMOL/L (ref 135–144)
SPECIFIC GRAVITY UA: 1.01 (ref 1–1.03)
TOTAL PROTEIN: 7.3 G/DL (ref 6.4–8.3)
TRICHOMONAS: ABNORMAL
TRIGL SERPL-MCNC: 93 MG/DL
TROPONIN INTERP: NORMAL
TROPONIN T: NORMAL NG/ML
TROPONIN, HIGH SENSITIVITY: <6 NG/L (ref 0–14)
TSH SERPL DL<=0.05 MIU/L-ACNC: 3.21 MIU/L (ref 0.3–5)
TURBIDITY: ABNORMAL
URINE HGB: ABNORMAL
UROBILINOGEN, URINE: ABNORMAL
VITAMIN D 25-HYDROXY: 27.1 NG/ML (ref 30–100)
VLDLC SERPL CALC-MCNC: NORMAL MG/DL (ref 1–30)
WBC # BLD: 14.6 K/UL (ref 3.5–11)
WBC UA: ABNORMAL /HPF
YEAST: ABNORMAL

## 2019-10-21 PROCEDURE — 81001 URINALYSIS AUTO W/SCOPE: CPT

## 2019-10-21 PROCEDURE — 87186 SC STD MICRODIL/AGAR DIL: CPT

## 2019-10-21 PROCEDURE — 36415 COLL VENOUS BLD VENIPUNCTURE: CPT

## 2019-10-21 PROCEDURE — 93005 ELECTROCARDIOGRAM TRACING: CPT

## 2019-10-21 PROCEDURE — 84443 ASSAY THYROID STIM HORMONE: CPT

## 2019-10-21 PROCEDURE — 80061 LIPID PANEL: CPT

## 2019-10-21 PROCEDURE — 82306 VITAMIN D 25 HYDROXY: CPT

## 2019-10-21 PROCEDURE — 85027 COMPLETE CBC AUTOMATED: CPT

## 2019-10-21 PROCEDURE — 83036 HEMOGLOBIN GLYCOSYLATED A1C: CPT

## 2019-10-21 PROCEDURE — 80053 COMPREHEN METABOLIC PANEL: CPT

## 2019-10-21 PROCEDURE — 87088 URINE BACTERIA CULTURE: CPT

## 2019-10-21 PROCEDURE — 84484 ASSAY OF TROPONIN QUANT: CPT

## 2019-10-21 PROCEDURE — 87086 URINE CULTURE/COLONY COUNT: CPT

## 2019-10-22 LAB
CULTURE: ABNORMAL
Lab: ABNORMAL
SPECIMEN DESCRIPTION: ABNORMAL

## 2019-10-23 DIAGNOSIS — N30.01 ACUTE CYSTITIS WITH HEMATURIA: Primary | ICD-10-CM

## 2019-10-23 RX ORDER — NITROFURANTOIN 25; 75 MG/1; MG/1
100 CAPSULE ORAL 2 TIMES DAILY
Qty: 10 CAPSULE | Refills: 0 | Status: SHIPPED | OUTPATIENT
Start: 2019-10-23 | End: 2019-10-28

## 2019-10-24 ENCOUNTER — TELEPHONE (OUTPATIENT)
Dept: FAMILY MEDICINE CLINIC | Age: 49
End: 2019-10-24

## 2019-10-24 ENCOUNTER — OFFICE VISIT (OUTPATIENT)
Dept: FAMILY MEDICINE CLINIC | Age: 49
End: 2019-10-24
Payer: COMMERCIAL

## 2019-10-24 VITALS
BODY MASS INDEX: 20.02 KG/M2 | OXYGEN SATURATION: 96 % | HEART RATE: 74 BPM | SYSTOLIC BLOOD PRESSURE: 124 MMHG | TEMPERATURE: 96.7 F | DIASTOLIC BLOOD PRESSURE: 82 MMHG | WEIGHT: 113 LBS | RESPIRATION RATE: 16 BRPM

## 2019-10-24 DIAGNOSIS — Z45.89 TYMPANOSTOMY TUBE CHECK: ICD-10-CM

## 2019-10-24 DIAGNOSIS — R73.03 PREDIABETES: ICD-10-CM

## 2019-10-24 DIAGNOSIS — Z82.49 FAMILY HISTORY OF HEART DISEASE: ICD-10-CM

## 2019-10-24 DIAGNOSIS — R94.31 SHORTENED PR INTERVAL: ICD-10-CM

## 2019-10-24 DIAGNOSIS — R42 VERTIGO: Primary | ICD-10-CM

## 2019-10-24 DIAGNOSIS — D72.829 LEUKOCYTOSIS, UNSPECIFIED TYPE: ICD-10-CM

## 2019-10-24 DIAGNOSIS — E55.9 VITAMIN D DEFICIENCY: ICD-10-CM

## 2019-10-24 DIAGNOSIS — N30.01 ACUTE CYSTITIS WITH HEMATURIA: ICD-10-CM

## 2019-10-24 DIAGNOSIS — I25.2 HISTORY OF MYOCARDIAL INFARCTION: ICD-10-CM

## 2019-10-24 PROBLEM — M67.959 TENDINOPATHY OF GLUTEAL REGION: Status: RESOLVED | Noted: 2018-10-10 | Resolved: 2019-10-24

## 2019-10-24 PROBLEM — R74.01 ELEVATED ALT MEASUREMENT: Status: RESOLVED | Noted: 2018-08-02 | Resolved: 2019-10-24

## 2019-10-24 LAB
EKG ATRIAL RATE: 99 BPM
EKG P AXIS: 65 DEGREES
EKG P-R INTERVAL: 110 MS
EKG Q-T INTERVAL: 312 MS
EKG QRS DURATION: 80 MS
EKG QTC CALCULATION (BAZETT): 400 MS
EKG R AXIS: 52 DEGREES
EKG T AXIS: 47 DEGREES
EKG VENTRICULAR RATE: 99 BPM

## 2019-10-24 PROCEDURE — 93010 ELECTROCARDIOGRAM REPORT: CPT | Performed by: INTERNAL MEDICINE

## 2019-10-24 PROCEDURE — 99214 OFFICE O/P EST MOD 30 MIN: CPT | Performed by: NURSE PRACTITIONER

## 2019-10-24 RX ORDER — ERGOCALCIFEROL 1.25 MG/1
50000 CAPSULE ORAL WEEKLY
Qty: 4 CAPSULE | Refills: 1 | Status: SHIPPED | OUTPATIENT
Start: 2019-10-24 | End: 2020-02-13

## 2019-10-24 ASSESSMENT — ENCOUNTER SYMPTOMS
SHORTNESS OF BREATH: 0
BACK PAIN: 0

## 2019-10-28 ENCOUNTER — CARE COORDINATION (OUTPATIENT)
Dept: OTHER | Facility: CLINIC | Age: 49
End: 2019-10-28

## 2019-11-01 ENCOUNTER — HOSPITAL ENCOUNTER (OUTPATIENT)
Dept: MRI IMAGING | Age: 49
Discharge: HOME | End: 2019-11-03
Payer: COMMERCIAL

## 2019-11-01 DIAGNOSIS — R42 VERTIGO: ICD-10-CM

## 2019-11-01 DIAGNOSIS — Z45.89 TYMPANOSTOMY TUBE CHECK: ICD-10-CM

## 2019-11-01 DIAGNOSIS — J34.1 CYST OF LEFT SPHENOID SINUS: ICD-10-CM

## 2019-11-01 DIAGNOSIS — R42 VERTIGO: Primary | ICD-10-CM

## 2019-11-01 PROCEDURE — 70551 MRI BRAIN STEM W/O DYE: CPT

## 2019-11-01 RX ORDER — MECLIZINE HYDROCHLORIDE 25 MG/1
25 TABLET ORAL 3 TIMES DAILY PRN
Qty: 30 TABLET | Refills: 0 | Status: SHIPPED | OUTPATIENT
Start: 2019-11-01 | End: 2019-11-11

## 2019-11-02 ENCOUNTER — PATIENT MESSAGE (OUTPATIENT)
Dept: FAMILY MEDICINE CLINIC | Age: 49
End: 2019-11-02

## 2019-11-04 ENCOUNTER — TELEPHONE (OUTPATIENT)
Dept: FAMILY MEDICINE CLINIC | Age: 49
End: 2019-11-04

## 2019-11-05 ENCOUNTER — HOSPITAL ENCOUNTER (OUTPATIENT)
Dept: NON INVASIVE DIAGNOSTICS | Age: 49
Discharge: HOME | End: 2019-11-05
Payer: COMMERCIAL

## 2019-11-05 DIAGNOSIS — Z82.49 FAMILY HISTORY OF HEART DISEASE: ICD-10-CM

## 2019-11-05 DIAGNOSIS — R42 VERTIGO: ICD-10-CM

## 2019-11-05 DIAGNOSIS — R94.31 SHORTENED PR INTERVAL: ICD-10-CM

## 2019-11-05 PROBLEM — I07.1 TRICUSPID REGURGITATION: Status: ACTIVE | Noted: 2019-11-05

## 2019-11-05 LAB
LV EF: 55 %
LVEF MODALITY: NORMAL

## 2019-11-05 PROCEDURE — 93306 TTE W/DOPPLER COMPLETE: CPT

## 2019-11-07 ENCOUNTER — TELEPHONE (OUTPATIENT)
Dept: FAMILY MEDICINE CLINIC | Age: 49
End: 2019-11-07

## 2019-11-07 ENCOUNTER — HOSPITAL ENCOUNTER (OUTPATIENT)
Age: 49
Setting detail: SPECIMEN
Discharge: HOME | End: 2019-11-07
Payer: COMMERCIAL

## 2019-11-07 ENCOUNTER — OFFICE VISIT (OUTPATIENT)
Dept: FAMILY MEDICINE CLINIC | Age: 49
End: 2019-11-07
Payer: COMMERCIAL

## 2019-11-07 VITALS
SYSTOLIC BLOOD PRESSURE: 112 MMHG | DIASTOLIC BLOOD PRESSURE: 76 MMHG | TEMPERATURE: 97 F | HEART RATE: 104 BPM | OXYGEN SATURATION: 97 % | BODY MASS INDEX: 20.55 KG/M2 | RESPIRATION RATE: 16 BRPM | WEIGHT: 116 LBS

## 2019-11-07 DIAGNOSIS — D72.829 LEUKOCYTOSIS, UNSPECIFIED TYPE: ICD-10-CM

## 2019-11-07 DIAGNOSIS — Z87.440 HISTORY OF UTI: ICD-10-CM

## 2019-11-07 DIAGNOSIS — R42 VERTIGO: Primary | ICD-10-CM

## 2019-11-07 DIAGNOSIS — Z45.89 TYMPANOSTOMY TUBE CHECK: ICD-10-CM

## 2019-11-07 DIAGNOSIS — R94.31 SHORTENED PR INTERVAL: ICD-10-CM

## 2019-11-07 PROBLEM — D69.6 THROMBOCYTOPENIA (HCC): Status: RESOLVED | Noted: 2018-08-02 | Resolved: 2019-11-07

## 2019-11-07 LAB
BILIRUBIN, POC: NEGATIVE
BLOOD URINE, POC: NEGATIVE
CLARITY, POC: NORMAL
COLOR, POC: NORMAL
GLUCOSE URINE, POC: NEGATIVE
KETONES, POC: NEGATIVE
LEUKOCYTE EST, POC: NORMAL
NITRITE, POC: NEGATIVE
PH, POC: 6
PROTEIN, POC: NEGATIVE
SPECIFIC GRAVITY, POC: 1.01
UROBILINOGEN, POC: 0.2

## 2019-11-07 PROCEDURE — 99214 OFFICE O/P EST MOD 30 MIN: CPT | Performed by: NURSE PRACTITIONER

## 2019-11-07 PROCEDURE — 81002 URINALYSIS NONAUTO W/O SCOPE: CPT | Performed by: NURSE PRACTITIONER

## 2019-11-07 ASSESSMENT — ENCOUNTER SYMPTOMS: SHORTNESS OF BREATH: 0

## 2019-11-09 LAB
CULTURE: ABNORMAL
Lab: ABNORMAL
SPECIMEN DESCRIPTION: ABNORMAL

## 2019-11-11 DIAGNOSIS — N39.0 RECURRENT URINARY TRACT INFECTION: Primary | ICD-10-CM

## 2019-11-11 RX ORDER — SULFAMETHOXAZOLE AND TRIMETHOPRIM 800; 160 MG/1; MG/1
1 TABLET ORAL 2 TIMES DAILY
Qty: 14 TABLET | Refills: 0 | Status: SHIPPED | OUTPATIENT
Start: 2019-11-11 | End: 2019-11-18

## 2019-11-18 ENCOUNTER — OFFICE VISIT (OUTPATIENT)
Dept: UROLOGY | Age: 49
End: 2019-11-18
Payer: COMMERCIAL

## 2019-11-18 VITALS
BODY MASS INDEX: 20.9 KG/M2 | HEART RATE: 92 BPM | HEIGHT: 63 IN | WEIGHT: 117.95 LBS | DIASTOLIC BLOOD PRESSURE: 79 MMHG | SYSTOLIC BLOOD PRESSURE: 106 MMHG | TEMPERATURE: 98.1 F

## 2019-11-18 DIAGNOSIS — R35.1 NOCTURIA: ICD-10-CM

## 2019-11-18 DIAGNOSIS — N39.0 RECURRENT UTI: Primary | ICD-10-CM

## 2019-11-18 LAB
APPEARANCE FLUID: NORMAL
BILIRUBIN, POC: NORMAL
BLOOD URINE, POC: NORMAL
CLARITY, POC: CLEAR
COLOR, POC: YELLOW
GLUCOSE URINE, POC: NORMAL
KETONES, POC: NORMAL
LEUKOCYTE EST, POC: NORMAL
NITRITE, POC: NORMAL
PH, POC: NORMAL
PROTEIN, POC: NORMAL
SPECIFIC GRAVITY, POC: NORMAL
UROBILINOGEN, POC: NORMAL

## 2019-11-18 PROCEDURE — 99204 OFFICE O/P NEW MOD 45 MIN: CPT | Performed by: UROLOGY

## 2019-11-18 PROCEDURE — 81002 URINALYSIS NONAUTO W/O SCOPE: CPT | Performed by: UROLOGY

## 2019-11-18 ASSESSMENT — ENCOUNTER SYMPTOMS
EYE REDNESS: 0
DIARRHEA: 0
VOMITING: 0
NAUSEA: 0
BACK PAIN: 0
WHEEZING: 0
CONSTIPATION: 0
COUGH: 0
ABDOMINAL PAIN: 0
SHORTNESS OF BREATH: 0
EYE PAIN: 0

## 2019-11-20 ENCOUNTER — OFFICE VISIT (OUTPATIENT)
Dept: FAMILY MEDICINE CLINIC | Age: 49
End: 2019-11-20
Payer: COMMERCIAL

## 2019-11-20 ENCOUNTER — TELEPHONE (OUTPATIENT)
Dept: FAMILY MEDICINE CLINIC | Age: 49
End: 2019-11-20

## 2019-11-20 VITALS
HEART RATE: 100 BPM | SYSTOLIC BLOOD PRESSURE: 112 MMHG | TEMPERATURE: 97.1 F | RESPIRATION RATE: 16 BRPM | DIASTOLIC BLOOD PRESSURE: 68 MMHG | OXYGEN SATURATION: 96 % | WEIGHT: 117 LBS | BODY MASS INDEX: 20.73 KG/M2

## 2019-11-20 DIAGNOSIS — R14.0 ABDOMINAL BLOATING: Primary | ICD-10-CM

## 2019-11-20 DIAGNOSIS — R42 VERTIGO: ICD-10-CM

## 2019-11-20 DIAGNOSIS — R14.0 ABDOMINAL DISTENTION: Primary | ICD-10-CM

## 2019-11-20 DIAGNOSIS — Z45.89 TYMPANOSTOMY TUBE CHECK: ICD-10-CM

## 2019-11-20 DIAGNOSIS — R94.31 SHORTENED PR INTERVAL: ICD-10-CM

## 2019-11-20 DIAGNOSIS — D72.829 LEUKOCYTOSIS, UNSPECIFIED TYPE: ICD-10-CM

## 2019-11-20 DIAGNOSIS — N39.0 RECURRENT URINARY TRACT INFECTION: ICD-10-CM

## 2019-11-20 PROCEDURE — 99214 OFFICE O/P EST MOD 30 MIN: CPT | Performed by: NURSE PRACTITIONER

## 2019-11-20 RX ORDER — MECLIZINE HYDROCHLORIDE 25 MG/1
25 TABLET ORAL 3 TIMES DAILY PRN
Status: ON HOLD | COMMUNITY
End: 2020-09-10 | Stop reason: ALTCHOICE

## 2019-11-20 ASSESSMENT — ENCOUNTER SYMPTOMS
ABDOMINAL PAIN: 0
VOMITING: 0
NAUSEA: 0
ABDOMINAL DISTENTION: 1
DIARRHEA: 0
CONSTIPATION: 0
SHORTNESS OF BREATH: 1
BLOOD IN STOOL: 0

## 2019-11-22 ENCOUNTER — HOSPITAL ENCOUNTER (OUTPATIENT)
Dept: PHYSICAL THERAPY | Age: 49
Setting detail: THERAPIES SERIES
Discharge: HOME | End: 2019-11-22
Payer: COMMERCIAL

## 2019-11-22 PROCEDURE — 97530 THERAPEUTIC ACTIVITIES: CPT

## 2019-11-22 PROCEDURE — 97161 PT EVAL LOW COMPLEX 20 MIN: CPT

## 2019-11-22 PROCEDURE — 97110 THERAPEUTIC EXERCISES: CPT

## 2019-12-02 ENCOUNTER — HOSPITAL ENCOUNTER (OUTPATIENT)
Dept: ULTRASOUND IMAGING | Age: 49
Discharge: HOME | End: 2019-12-04
Payer: COMMERCIAL

## 2019-12-02 DIAGNOSIS — R14.0 ABDOMINAL BLOATING: ICD-10-CM

## 2019-12-02 DIAGNOSIS — N39.0 RECURRENT UTI: ICD-10-CM

## 2019-12-02 PROCEDURE — 76775 US EXAM ABDO BACK WALL LIM: CPT

## 2019-12-05 ENCOUNTER — TELEPHONE (OUTPATIENT)
Dept: FAMILY MEDICINE CLINIC | Age: 49
End: 2019-12-05

## 2019-12-09 ENCOUNTER — PROCEDURE VISIT (OUTPATIENT)
Dept: UROLOGY | Age: 49
End: 2019-12-09
Payer: COMMERCIAL

## 2019-12-09 VITALS
WEIGHT: 117 LBS | TEMPERATURE: 98.1 F | BODY MASS INDEX: 21.53 KG/M2 | HEART RATE: 79 BPM | HEIGHT: 62 IN | DIASTOLIC BLOOD PRESSURE: 82 MMHG | SYSTOLIC BLOOD PRESSURE: 130 MMHG

## 2019-12-09 DIAGNOSIS — N95.2 VAGINAL ATROPHY: ICD-10-CM

## 2019-12-09 DIAGNOSIS — N39.0 RECURRENT UTI: Primary | ICD-10-CM

## 2019-12-09 PROCEDURE — 52000 CYSTOURETHROSCOPY: CPT | Performed by: UROLOGY

## 2019-12-09 RX ORDER — ESTRADIOL 0.1 MG/G
2 CREAM VAGINAL DAILY
Qty: 1 TUBE | Refills: 5 | Status: SHIPPED | OUTPATIENT
Start: 2019-12-09 | End: 2020-08-13 | Stop reason: ALTCHOICE

## 2019-12-24 ENCOUNTER — HOSPITAL ENCOUNTER (OUTPATIENT)
Dept: ULTRASOUND IMAGING | Age: 49
Discharge: HOME | End: 2019-12-26
Payer: COMMERCIAL

## 2019-12-24 ENCOUNTER — HOSPITAL ENCOUNTER (OUTPATIENT)
Dept: VASCULAR LAB | Age: 49
Discharge: HOME | End: 2019-12-24
Payer: COMMERCIAL

## 2019-12-24 DIAGNOSIS — R14.0 ABDOMINAL DISTENTION: ICD-10-CM

## 2019-12-24 DIAGNOSIS — Z72.0 TOBACCO USER: ICD-10-CM

## 2019-12-24 PROCEDURE — 93923 UPR/LXTR ART STDY 3+ LVLS: CPT

## 2019-12-24 PROCEDURE — 76856 US EXAM PELVIC COMPLETE: CPT

## 2019-12-24 PROCEDURE — 76705 ECHO EXAM OF ABDOMEN: CPT

## 2019-12-26 ENCOUNTER — OFFICE VISIT (OUTPATIENT)
Dept: FAMILY MEDICINE CLINIC | Age: 49
End: 2019-12-26
Payer: COMMERCIAL

## 2019-12-26 VITALS
HEART RATE: 81 BPM | BODY MASS INDEX: 22.5 KG/M2 | TEMPERATURE: 98 F | WEIGHT: 123 LBS | OXYGEN SATURATION: 98 % | DIASTOLIC BLOOD PRESSURE: 82 MMHG | SYSTOLIC BLOOD PRESSURE: 124 MMHG | RESPIRATION RATE: 16 BRPM

## 2019-12-26 DIAGNOSIS — R45.1 AGITATION: ICD-10-CM

## 2019-12-26 DIAGNOSIS — N39.0 RECURRENT URINARY TRACT INFECTION: ICD-10-CM

## 2019-12-26 DIAGNOSIS — N95.1 HOT FLASHES, MENOPAUSAL: ICD-10-CM

## 2019-12-26 DIAGNOSIS — R45.86 MOOD SWINGS: Primary | ICD-10-CM

## 2019-12-26 DIAGNOSIS — R94.31 SHORTENED PR INTERVAL: ICD-10-CM

## 2019-12-26 PROCEDURE — 99214 OFFICE O/P EST MOD 30 MIN: CPT | Performed by: NURSE PRACTITIONER

## 2019-12-26 RX ORDER — ESCITALOPRAM OXALATE 10 MG/1
10 TABLET ORAL DAILY
Qty: 30 TABLET | Refills: 1 | Status: SHIPPED | OUTPATIENT
Start: 2019-12-26 | End: 2020-08-13 | Stop reason: SDUPTHER

## 2019-12-27 ENCOUNTER — TELEPHONE (OUTPATIENT)
Dept: FAMILY MEDICINE CLINIC | Age: 49
End: 2019-12-27

## 2020-01-30 ENCOUNTER — HOSPITAL ENCOUNTER (OUTPATIENT)
Dept: GENERAL RADIOLOGY | Age: 50
Discharge: HOME | End: 2020-02-01
Payer: COMMERCIAL

## 2020-01-30 ENCOUNTER — OFFICE VISIT (OUTPATIENT)
Dept: PRIMARY CARE CLINIC | Age: 50
End: 2020-01-30
Payer: COMMERCIAL

## 2020-01-30 ENCOUNTER — HOSPITAL ENCOUNTER (OUTPATIENT)
Age: 50
Discharge: HOME | End: 2020-02-01
Payer: COMMERCIAL

## 2020-01-30 ENCOUNTER — TELEPHONE (OUTPATIENT)
Dept: FAMILY MEDICINE CLINIC | Age: 50
End: 2020-01-30

## 2020-01-30 VITALS
DIASTOLIC BLOOD PRESSURE: 89 MMHG | WEIGHT: 123 LBS | HEART RATE: 64 BPM | BODY MASS INDEX: 22.5 KG/M2 | OXYGEN SATURATION: 98 % | SYSTOLIC BLOOD PRESSURE: 126 MMHG

## 2020-01-30 PROCEDURE — 99202 OFFICE O/P NEW SF 15 MIN: CPT | Performed by: INTERNAL MEDICINE

## 2020-01-30 PROCEDURE — 73130 X-RAY EXAM OF HAND: CPT

## 2020-01-30 ASSESSMENT — PATIENT HEALTH QUESTIONNAIRE - PHQ9
2. FEELING DOWN, DEPRESSED OR HOPELESS: 0
SUM OF ALL RESPONSES TO PHQ QUESTIONS 1-9: 0
SUM OF ALL RESPONSES TO PHQ QUESTIONS 1-9: 0
1. LITTLE INTEREST OR PLEASURE IN DOING THINGS: 0
SUM OF ALL RESPONSES TO PHQ9 QUESTIONS 1 & 2: 0

## 2020-01-30 NOTE — PROGRESS NOTES
Visit Information    Have you changed or started any medications since your last visit including any over-the-counter medicines, vitamins, or herbal medicines? no   Are you having any side effects from any of your medications? -  no  Have you stopped taking any of your medications? Is so, why? -  no    Have you seen any other physician or provider since your last visit? No  Have you had any other diagnostic tests since your last visit? No  Have you been seen in the emergency room and/or had an admission to a hospital since we last saw you? No  Have you had your routine dental cleaning in the past 6 months? no    Have you activated your [employer] account? If not, what are your barriers?  Yes     Patient Care Team:  BRET Ulloa CNP as PCP - General (Nurse Practitioner Family)  BRET Ulloa CNP as PCP - [facility] Provider    Medical History Review  Past Medical, Family, and Social History reviewed and does contribute to the patient presenting condition    Health Maintenance   Topic Date Due    DTaP/Tdap/Td vaccine (1 - Tdap) 10/17/2020 (Originally 9/27/1981)    A1C test (Diabetic or Prediabetic)  10/21/2020    Cervical cancer screen  09/27/2023    Lipid screen  10/21/2024    Flu vaccine  Completed    Pneumococcal 0-64 years Vaccine  Completed    HIV screen  Completed

## 2020-01-30 NOTE — TELEPHONE ENCOUNTER
Patient called and said that she went to a walk-in clinic today for hand pain and had an xr done. They didn't review the xr with her. She wants to know if you are able to review the xr and let her know the results.   She had the xr done through [facility]

## 2020-02-13 ENCOUNTER — OFFICE VISIT (OUTPATIENT)
Dept: FAMILY MEDICINE CLINIC | Age: 50
End: 2020-02-13
Payer: COMMERCIAL

## 2020-02-13 VITALS
TEMPERATURE: 97.3 F | RESPIRATION RATE: 16 BRPM | WEIGHT: 123 LBS | DIASTOLIC BLOOD PRESSURE: 76 MMHG | HEART RATE: 90 BPM | BODY MASS INDEX: 22.5 KG/M2 | OXYGEN SATURATION: 97 % | SYSTOLIC BLOOD PRESSURE: 114 MMHG

## 2020-02-13 PROBLEM — M79.644 PAIN OF RIGHT THUMB: Status: ACTIVE | Noted: 2020-02-13

## 2020-02-13 PROCEDURE — 99214 OFFICE O/P EST MOD 30 MIN: CPT | Performed by: NURSE PRACTITIONER

## 2020-02-13 RX ORDER — IBUPROFEN 800 MG/1
800 TABLET ORAL EVERY 8 HOURS PRN
COMMUNITY
End: 2020-02-13 | Stop reason: SDUPTHER

## 2020-02-13 RX ORDER — IBUPROFEN 800 MG/1
800 TABLET ORAL EVERY 8 HOURS PRN
Qty: 90 TABLET | Refills: 0 | Status: SHIPPED | OUTPATIENT
Start: 2020-02-13 | End: 2020-12-16 | Stop reason: SDUPTHER

## 2020-02-13 ASSESSMENT — ENCOUNTER SYMPTOMS: RESPIRATORY NEGATIVE: 1

## 2020-02-13 NOTE — PROGRESS NOTES
Visit Information    Have you changed or started any medications since your last visit including any over-the-counter medicines, vitamins, or herbal medicines? no   Have you stopped taking any of your medications? Is so, why? -  no  Are you having any side effects from any of your medications? - no    Have you seen any other physician or provider since your last visit?  no   Have you had any other diagnostic tests since your last visit?  no   Have you been seen in the emergency room and/or had an admission in a hospital since we last saw you?  yes - Urgent care   Have you had your routine dental cleaning in the past 6 months?  no     Do you have an active MyChart account? If no, what is the barrier?   Yes    Patient Care Team:  BRET Caldwell CNP as PCP - General (Nurse Practitioner Family)  BRET Caldwell CNP as PCP - [facility]    Medical History Review  Past Medical, Family, and Social History reviewed and does contribute to the patient presenting condition    Health Maintenance   Topic Date Due    DTaP/Tdap/Td vaccine (1 - Tdap) 10/17/2020 (Originally 9/27/1981)    Shingles Vaccine (1 of 2) 09/27/2020    A1C test (Diabetic or Prediabetic)  10/21/2020    Cervical cancer screen  09/27/2023    Lipid screen  10/21/2024    Flu vaccine  Completed    Pneumococcal 0-64 years Vaccine  Completed    HIV screen  Completed    Hepatitis A vaccine  Aged Out    Hepatitis B vaccine  Aged Out    Hib vaccine  Aged Out    Meningococcal (ACWY) vaccine  Aged Out

## 2020-02-13 NOTE — PROGRESS NOTES
10/21/2024    Flu vaccine  Completed    Pneumococcal 0-64 years Vaccine  Completed    HIV screen  Completed    Hepatitis A vaccine  Aged Out    Hepatitis B vaccine  Aged Out    Hib vaccine  Aged Out    Meningococcal (ACWY) vaccine  Aged Out     Subjective:   Review of Systems   Constitutional: Negative for chills and fever. Respiratory: Negative. Cardiovascular: Negative. Endocrine: Positive for heat intolerance (Hot flashes improved). Musculoskeletal: Positive for arthralgias and joint swelling. Psychiatric/Behavioral: Negative. Objective:   /76   Pulse 90   Temp 97.3 °F (36.3 °C)   Resp 16   Wt 123 lb (55.8 kg)   SpO2 97%   BMI 22.50 kg/m²   Physical Exam  Vitals signs and nursing note reviewed. Constitutional:       Appearance: Normal appearance. She is well-developed. She is not ill-appearing or toxic-appearing. HENT:      Head: Normocephalic and atraumatic. Right Ear: External ear normal.      Left Ear: External ear normal.   Eyes:      Conjunctiva/sclera: Conjunctivae normal.   Neck:      Musculoskeletal: Normal range of motion. Cardiovascular:      Rate and Rhythm: Normal rate and regular rhythm. Pulses:           Dorsalis pedis pulses are 2+ on the right side. Posterior tibial pulses are 2+ on the right side. Heart sounds: Normal heart sounds. No murmur. No friction rub. No gallop. Pulmonary:      Effort: Pulmonary effort is normal. No accessory muscle usage or respiratory distress. Breath sounds: Normal breath sounds. No decreased breath sounds, wheezing, rhonchi or rales. Musculoskeletal:      Right wrist: Normal.      Right hand: She exhibits decreased range of motion, tenderness, bony tenderness (IP joint of right hand) and swelling (Entire right thumb- mild, no erythema). She exhibits normal two-point discrimination, normal capillary refill, no deformity and no laceration. Normal sensation noted. Decreased strength noted.  She exhibits thumb/finger opposition (Restricted ROM). Skin:     General: Skin is warm and dry. Findings: No erythema or rash. Neurological:      Mental Status: She is alert and oriented to person, place, and time. Gait: Gait normal.   Psychiatric:         Attention and Perception: Attention normal.         Mood and Affect: Mood normal.         Speech: Speech normal.         Behavior: Behavior normal.       Data:     Lab Results   Component Value Date     10/21/2019    K 4.0 10/21/2019     10/21/2019    CO2 25 10/21/2019    BUN 8 10/21/2019    CREATININE 0.90 10/21/2019    GLUCOSE 115 10/21/2019    PROT 7.3 10/21/2019    LABALBU 3.4 10/21/2019    BILITOT 0.38 10/21/2019    ALKPHOS 75 10/21/2019    AST 10 10/21/2019    ALT 9 10/21/2019     Lab Results   Component Value Date    WBC 14.6 10/21/2019    RBC 4.24 10/21/2019    HGB 12.3 10/21/2019    HCT 37.3 10/21/2019    MCV 88.0 10/21/2019    MCH 29.0 10/21/2019    MCHC 33.0 10/21/2019    RDW 14.8 10/21/2019     10/21/2019    MPV 9.2 10/21/2019     Lab Results   Component Value Date    TSH 3.21 10/21/2019     Lab Results   Component Value Date    CHOL 107 10/21/2019    HDL 44 10/21/2019    LABA1C 5.8 10/21/2019     Assessment & Plan:      1. Pain of right thumb  -Will repeat hand X-ray, possible fx obscured by swelling, add wrist x-ray as well  -Passive ROM recommended  -Ice PRN  -Con't IBU PRN (avoid taking at same time as Lexapro), may also take Tylenol PRN OTC  -Call if pain/swelling worsens/for any numbness/tingling  - XR HAND RIGHT (MIN 3 VIEWS); Future  - XR WRIST RIGHT (MIN 3 VIEWS); Future  - ibuprofen (ADVIL;MOTRIN) 800 MG tablet; Take 1 tablet by mouth every 8 hours as needed for Pain  Dispense: 90 tablet; Refill: 0  - Thumb Spica MISC; by Does not apply route Wear daily as needed for support/pain relief. Dispense: 1 each; Refill: 0  -Offered Ortho referral- patient would like to wait until review of updated X-rays    2.  Mood swings  3. Agitation  4. Hot flashes, menopausal  -Stable, improved: Con't all medications as ordered, con't current tx plan    5. Vitamin D deficiency  -States she never obtained weekly Vit. D- will re-check  - Vitamin D 25 Hydroxy; Future    6. Leukocytosis, unspecified type  -Will re-check labs:  - CBC Auto Differential; Future    Patient was also evaluated in conjunction with Kim Richey RN, APRN-Student. I have discussed the case, including pertinent history and exam findings with the APRN Student. I have seen and examined the patient with the student nurse practitioner, and all key elements of the encounter have been performed and documented by me. Return in about 1 month (around 3/13/2020) for re-check thumb. Discussed use, benefit, and side effects of prescribed medications. Barriers to medication compliance addressed. All patient questions answered, patient voiced understanding. Tracie Pablo.  Milo Linares, APRN-CNP

## 2020-03-05 ENCOUNTER — TELEPHONE (OUTPATIENT)
Dept: UROLOGY | Age: 50
End: 2020-03-05

## 2020-03-05 NOTE — TELEPHONE ENCOUNTER
Left message with patient that appointment time on 3/9/2020 has been changed to 12:10pm due to the provider needing to leave early that day.

## 2020-03-09 ENCOUNTER — HOSPITAL ENCOUNTER (OUTPATIENT)
Dept: GENERAL RADIOLOGY | Age: 50
Discharge: HOME | End: 2020-03-11
Payer: COMMERCIAL

## 2020-03-09 ENCOUNTER — HOSPITAL ENCOUNTER (OUTPATIENT)
Age: 50
Discharge: HOME | End: 2020-03-11
Payer: COMMERCIAL

## 2020-03-09 ENCOUNTER — TELEPHONE (OUTPATIENT)
Dept: UROLOGY | Age: 50
End: 2020-03-09

## 2020-03-09 PROCEDURE — 73110 X-RAY EXAM OF WRIST: CPT

## 2020-03-09 PROCEDURE — 73130 X-RAY EXAM OF HAND: CPT

## 2020-03-11 ENCOUNTER — OFFICE VISIT (OUTPATIENT)
Dept: FAMILY MEDICINE CLINIC | Age: 50
End: 2020-03-11
Payer: COMMERCIAL

## 2020-03-11 ENCOUNTER — TELEPHONE (OUTPATIENT)
Dept: FAMILY MEDICINE CLINIC | Age: 50
End: 2020-03-11

## 2020-03-11 ENCOUNTER — HOSPITAL ENCOUNTER (OUTPATIENT)
Age: 50
Setting detail: SPECIMEN
Discharge: HOME | End: 2020-03-11
Payer: COMMERCIAL

## 2020-03-11 VITALS
WEIGHT: 120 LBS | TEMPERATURE: 102.7 F | HEART RATE: 128 BPM | DIASTOLIC BLOOD PRESSURE: 78 MMHG | OXYGEN SATURATION: 98 % | BODY MASS INDEX: 21.95 KG/M2 | SYSTOLIC BLOOD PRESSURE: 115 MMHG

## 2020-03-11 LAB
BILIRUBIN, POC: NEGATIVE
BLOOD URINE, POC: ABNORMAL
CLARITY, POC: ABNORMAL
COLOR, POC: ABNORMAL
GLUCOSE URINE, POC: NEGATIVE
INFLUENZA A ANTIBODY: NEGATIVE
INFLUENZA B ANTIBODY: NEGATIVE
KETONES, POC: 15
LEUKOCYTE EST, POC: ABNORMAL
NITRITE, POC: POSITIVE
PH, POC: 6
PROTEIN, POC: 100
SPECIFIC GRAVITY, POC: 1.02
UROBILINOGEN, POC: 8

## 2020-03-11 PROCEDURE — 99214 OFFICE O/P EST MOD 30 MIN: CPT | Performed by: FAMILY MEDICINE

## 2020-03-11 PROCEDURE — 87804 INFLUENZA ASSAY W/OPTIC: CPT | Performed by: FAMILY MEDICINE

## 2020-03-11 PROCEDURE — 81003 URINALYSIS AUTO W/O SCOPE: CPT | Performed by: FAMILY MEDICINE

## 2020-03-11 RX ORDER — SULFAMETHOXAZOLE AND TRIMETHOPRIM 800; 160 MG/1; MG/1
1 TABLET ORAL 2 TIMES DAILY
Qty: 6 TABLET | Refills: 0 | Status: SHIPPED | OUTPATIENT
Start: 2020-03-11 | End: 2020-03-14

## 2020-03-11 RX ORDER — PHENAZOPYRIDINE HYDROCHLORIDE 100 MG/1
100 TABLET, FILM COATED ORAL 3 TIMES DAILY PRN
Qty: 21 TABLET | Refills: 0 | Status: SHIPPED | OUTPATIENT
Start: 2020-03-11 | End: 2020-08-13 | Stop reason: ALTCHOICE

## 2020-03-11 ASSESSMENT — ENCOUNTER SYMPTOMS
ABDOMINAL PAIN: 0
SORE THROAT: 0
VOMITING: 0
DIARRHEA: 0
RHINORRHEA: 0
COUGH: 1
NAUSEA: 0
SHORTNESS OF BREATH: 0
WHEEZING: 0

## 2020-03-11 NOTE — LETTER
"DOS: 2017  NAME: Angelita Brambila   : 1963  PCP: Ivonne Bradley MD  Chief Complaint   Patient presents with   • Neuro Deficit(s)     CC: Stroke    Subjective: Overnight patient still with confusion requiring posey and multiple doses of Ativan; no ativan required all day today on dayshift. Patient does well when redirected. NIHSS 8 per nursing. EEG added yesterday to access for seizures activity; noting concerning noted by nursing. EEG Pending .       Objective:  Vital signs: /79 (BP Location: Right arm, Patient Position: Lying)  Pulse 107  Temp 98.1 °F (36.7 °C) (Oral)   Resp 18  Ht 165.1 cm (65\")  Wt 66.2 kg (146 lb)  LMP Comment: hysterectomy   SpO2 95%  BMI 24.3 kg/m2     Physical Exam:  GENERAL: NAD  HEENT: Normocephalic, atraumatic   COR: RRR  Resp: Even and unlabored  Extremities: No signs of distal embolization. Decreased ROM of right wrist secondary to pain, bruising.    Neurological:   MS: AO. Naming and repetition intact. Followed commands. Normal higher integrative function.  CN: II-XII normal except for left facial weakness and left superior temporal HHH. mild dysarthria  Motor: Normal strength on right. LUE with pronator drift  Sensory: Decreased sensation of LUE  Coordination: Normal on right.     Results Review:     I reviewed the patient's new clinical results.    Current Medications:    amLODIPine 10 mg Oral Daily   aspirin 81 mg Oral Daily   atorvastatin 80 mg Oral Nightly   chlordiazePOXIDE 50 mg Oral Q8H   famotidine 20 mg Oral BID   folic acid 1 mg Oral Daily   nicotine 1 patch Transdermal Q24H   QUEtiapine 25 mg Oral Nightly   rOPINIRole 1 mg Oral BID   thiamine 200 mg Oral Daily       sodium chloride 75 mL/hr Last Rate: 75 mL/hr (17 0840)       Medications Reviewed    Laboratory results:  Lab Results   Component Value Date    GLUCOSE 90 2017    CALCIUM 9.2 2017     2017    K 3.7 2017    CO2 22.7 2017     2017 " [facility]  [address] [facility], [address]  [address] [facility]  Phone: [phone]  Fax: [phone]    BRET Mason CNP        March 11, 2020     Patient: Sai Williamson   YOB: 1970           To Whom It May Concern: It is my medical opinion that Sai Williamson should remain off of work from 3-11-20 through 3-17-20. If you have any questions or concerns, please don't hesitate to call.     Sincerely,        BRET Mason CNP    BUN 5 (L) 12/06/2017    CREATININE 0.57 12/06/2017    EGFRIFNONA 111 12/06/2017    BCR 8.8 12/06/2017    ANIONGAP 16.3 12/06/2017     Lab Results   Component Value Date    WBC 5.94 12/06/2017    HGB 13.6 12/06/2017    HCT 41.1 12/06/2017    .9 (H) 12/06/2017     12/06/2017        Results from last 7 days  Lab Units 12/02/17  0631   CHOLESTEROL mg/dL 184     Lab Results   Component Value Date    INR 1.04 12/04/2017    INR 0.96 12/01/2017    PROTIME 13.2 12/04/2017    PROTIME 12.4 12/01/2017     No results found for: TSH  Lab Results   Component Value Date    LDLCALC 116 (H) 12/02/2017     Lab Results   Component Value Date    HGBA1C 4.81 12/02/2017     No components found for: B12    Review of imaging:  CTA h/n, CTP 12/1/17: IMPRESSION:  1.  The CT perfusion demonstrates abnormal perfusion involving the right  frontal lobe with a core area of abnormal CBF estimated to be 6 mL in  volume. There is delayed perfusion estimated to be 28 mL in volume  resulting in a mismatch ratio of 4.7. However, it should be noted that  this may be under represented as the CT examination demonstrates more  superior lateral areas of abnormal attenuation. There is a small linear  area of increased attenuation noted on the noncontrasted CT examination  suggesting thrombus overlying the right frontal lobe laterally.  2.  The CT angiogram demonstrates approximately 50% stenosis of the  proximal aspect of the internal carotid artery on the right secondary to  markedly irregular noncalcified plaque/thrombus. Intracranially there is  decreased opacification suggesting thrombus involving the small anterior  sylvian branches of the posterior division of the right middle cerebral  artery.  CT head 12/2/17: CT scan was performed through the brain without contrast and compared to  12/01/2017 and now demonstrates areas of hemorrhagic transformation in  the area of acute right MCA infarct. The largest area of hemorrhage is  in the  right temporofrontal region and measures up to 1.2 x 1.5 cm.  Other adjacent areas are much less defined and consistent with petechial  hemorrhage. There is very slight mass effect with some effacement of the  cortical sulci, consistent with the infarct. There is also slight  midline shift to the left measuring 2 or 3 mm.  MRI brain 12/3/17: CONCLUSION: Large acute to subacute right MCA distribution infarct with  further areas of hemorrhagic transformation and slight worsening of  associated mass effect when compared to yesterday's CT scan.     Impression: Ms. Brambila is a 55 yo with CAD (MI in 2004), HLD, long history of smoking and daily ETOH use who transferred on 12/1 with left side weakness with a NIH of 10; received  IV TPA with hemorrhagic conversion.  ASA 81mg started yesterday, TCD w/ emboli monitoring;unremarkable.   Since TCDs are negative we recommend DOMI to further evaluate CVA. Librium has since been started by medicine for questionable ETOH withdrawal. EEG ordered; pending Continue Lipitor and aspirin. PT/OT/ST. CCP for discharge planning/rehab needs. Please call with questions or concerns. Follow-up in stroke clinic with Nguyễn.      TCD Report   Velocities in all vessels within their receptive ranges and spectral waveforms were unremarkable.     EEG Pending     Plan:  Aspirin 81mg   TCD w/emboli monitoring (negative)  DOMI recommended   Lipitor 80 mg  Hydrate  Neuroccks  Non-pharmacological DVT prophylaxis  EKG Tele  PT/OT/ST  Stroke Education  Blood pressure control to <130/80  Goal LDL <70-recommend high dose statins-   Serum glucose < 140    Call [phone] for stroke/TIA symptoms  CTA head/neck in 3 weeks.   F/U in stroke clinic to be determined pending results of above, call [phone] for questions    I have discussed the above with the patient and family.    All imaging and labs reviewed with Dr. Adrian and he agrees with radiology impressions and plan. No significant lab abnormalities.      Starr Varner, APRN  12/06/17  5:19 PM      Active Problems:    CVA (cerebral vascular accident)

## 2020-03-11 NOTE — LETTER
[address] [id number]  [place] , [address]  [address] [facility]  Phone: [phone]  Fax: [phone]    BRET Mueller CNP        March 11, 2020     Patient: Marisol Weaver   YOB: 1970   Date of Visit: 3/11/2020       To Whom It May Concern: It is my medical opinion that Marisol Weaver should remain off of work from 3-11-20 through 3-17-20. If you have any questions or concerns, please don't hesitate to call.     Sincerely,        BRET Mueller CNP

## 2020-03-11 NOTE — PATIENT INSTRUCTIONS
Patient Education        Urinary Tract Infection in Women: Care Instructions  Your Care Instructions    A urinary tract infection, or UTI, is a general term for an infection anywhere between the kidneys and the urethra (where urine comes out). Most UTIs are bladder infections. They often cause pain or burning when you urinate. UTIs are caused by bacteria and can be cured with antibiotics. Be sure to complete your treatment so that the infection goes away. Follow-up care is a key part of your treatment and safety. Be sure to make and go to all appointments, and call your doctor if you are having problems. It's also a good idea to know your test results and keep a list of the medicines you take. How can you care for yourself at home? · Take your antibiotics as directed. Do not stop taking them just because you feel better. You need to take the full course of antibiotics. · Drink extra water and other fluids for the next day or two. This may help wash out the bacteria that are causing the infection. (If you have kidney, heart, or liver disease and have to limit fluids, talk with your doctor before you increase your fluid intake.)  · Avoid drinks that are carbonated or have caffeine. They can irritate the bladder. · Urinate often. Try to empty your bladder each time. · To relieve pain, take a hot bath or lay a heating pad set on low over your lower belly or genital area. Never go to sleep with a heating pad in place. To prevent UTIs  · Drink plenty of water each day. This helps you urinate often, which clears bacteria from your system. (If you have kidney, heart, or liver disease and have to limit fluids, talk with your doctor before you increase your fluid intake.)  · Urinate when you need to. · Urinate right after you have sex. · Change sanitary pads often. · Avoid douches, bubble baths, feminine hygiene sprays, and other feminine hygiene products that have deodorants.   · After going to the bathroom, wipe from front to back. When should you call for help? Call your doctor now or seek immediate medical care if:    · Symptoms such as fever, chills, nausea, or vomiting get worse or appear for the first time.     · You have new pain in your back just below your rib cage. This is called flank pain.     · There is new blood or pus in your urine.     · You have any problems with your antibiotic medicine.    Watch closely for changes in your health, and be sure to contact your doctor if:    · You are not getting better after taking an antibiotic for 2 days.     · Your symptoms go away but then come back. Where can you learn more? Go to [email or web address] and sign in to your [employer] account. Enter W796 in the [employer] box to learn more about \"Urinary Tract Infection in Women: Care Instructions. \"     If you do not have an account, please click on the \"Sign Up Now\" link. Current as of: August 21, 2019  Content Version: 12.3  © 4907-4696 Healthwise, Incorporated. Care instructions adapted under license by [facility] ([facility]). If you have questions about a medical condition or this instruction, always ask your healthcare professional. [address] any warranty or liability for your use of this information. 1.)  Urinalysis positive for UTI. Will send for urine culture and call with results. Take Bactrim as prescribed for UTI. If symptoms of left-sided flank pain persist or worsen then go to ER  2.)  Flu test in office negative.   3.)If symptoms worsen or do not improve please follow-up with PCP or return to clinic

## 2020-03-11 NOTE — TELEPHONE ENCOUNTER
Letter completed, but she does need to be seen by urgent care today and will need to see either me or Charly Alva in order to return back to work.

## 2020-03-11 NOTE — PROGRESS NOTES
[address] MEDICINE   [address] [address]  Dept: [phone]  Dept Fax: [phone]    Odette Weir is a 52 y.o. female who presents today for her medical conditions/complaintsas noted below. Odette Weir is c/o of Cough (fever, body aches x 3 days)        HPI:     Cough   This is a new problem. The current episode started in the past 7 days (3 days). The problem has been unchanged. The problem occurs constantly. The cough is productive of sputum. Associated symptoms include chills, a fever (101-102F), headaches and myalgias. Pertinent negatives include no ear congestion, ear pain, nasal congestion, rash, rhinorrhea, sore throat, shortness of breath or wheezing. Associated symptoms comments: dysuria. Nothing aggravates the symptoms. She has tried nothing for the symptoms. Her past medical history is significant for COPD. There is no history of asthma or environmental allergies. NO sick contacts  NO recent travel  Patient also reports symptoms of dysuria and urinary frequency. She denies any vaginal discharge and reports she is not currently sexually active.   She has not had history of kidney infection in the past but has had UTIs several times in the past.  Past Medical History:   Diagnosis Date    COPD (chronic obstructive pulmonary disease) ([address]     Former smoker     MI, old     Past medical history reviewed and pertinent positives/negatives in the HPI    Past Surgical History:   Procedure Laterality Date    HYSTERECTOMY      TYMPANOSTOMY TUBE PLACEMENT      About 3 years ago per patient- b/l       Family History   Problem Relation Age of Onset    High Blood Pressure Father     Cancer Paternal Uncle     Cancer Paternal Grandmother        Social History     Tobacco Use    Smoking status: Current Every Day Smoker     Packs/day: 0.25     Types: Cigarettes    Smokeless tobacco: Never Used    Tobacco comment: pt smokes about 1 cigg daily   Substance Use Topics    Alcohol use: No      Current Outpatient Medications   Medication Sig Dispense Refill    sulfamethoxazole-trimethoprim (BACTRIM DS) 800-160 MG per tablet Take 1 tablet by mouth 2 times daily for 3 days 6 tablet 0    ibuprofen (ADVIL;MOTRIN) 800 MG tablet Take 1 tablet by mouth every 8 hours as needed for Pain 90 tablet 0    Thumb Spica MISC by Does not apply route Wear daily as needed for support/pain relief for right thumb pain. 1 each 0    escitalopram (LEXAPRO) 10 MG tablet Take 1 tablet by mouth daily 30 tablet 1    ESTRACE VAGINAL 0.1 MG/GM vaginal cream Place 2 g vaginally daily Apply to vagina daily for two weeks then twice a week thereafter. 1 Tube 5    meclizine (ANTIVERT) 25 MG tablet Take 25 mg by mouth 3 times daily as needed for Dizziness or Nausea       glycopyrrolate-formoterol (BEVESPI) 9-4.8 MCG/ACT AERO Inhale 2 puffs into the lungs 2 times daily      VENTOLIN  (90 Base) MCG/ACT inhaler Inhale 2 puffs into the lungs every 4 hours as needed for Wheezing or Shortness of Breath 1 Inhaler 1    gabapentin (NEURONTIN) 100 MG capsule Take 100 mg by mouth 3 times daily. Izabel Jordanman 0     No current facility-administered medications for this visit. No Known Allergies    Health Maintenance   Topic Date Due    DTaP/Tdap/Td vaccine (1 - Tdap) 10/17/2020 (Originally 9/27/1989)    Shingles Vaccine (1 of 2) 09/27/2020    A1C test (Diabetic or Prediabetic)  10/21/2020    Cervical cancer screen  09/27/2023    Lipid screen  10/21/2024    Flu vaccine  Completed    Pneumococcal 0-64 years Vaccine  Completed    HIV screen  Completed    Hepatitis A vaccine  Aged Out    Hepatitis B vaccine  Aged Out    Hib vaccine  Aged Out    Meningococcal (ACWY) vaccine  Aged Out       :      Review of Systems   Constitutional: Positive for chills and fever (101-102F). HENT: Negative for ear pain, rhinorrhea and sore throat. Respiratory: Positive for cough. Negative for shortness of breath and wheezing. Gastrointestinal: Negative for abdominal pain, diarrhea (once), nausea and vomiting. Genitourinary: Positive for dysuria and frequency. Negative for flank pain, hematuria, pelvic pain and vaginal discharge. Musculoskeletal: Positive for myalgias. Skin: Negative for pallor and rash. Allergic/Immunologic: Negative for environmental allergies. Neurological: Positive for headaches. Hematological: Negative for adenopathy. Objective:     Physical Exam  Vitals signs and nursing note reviewed. Constitutional:       Appearance: Normal appearance. She is normal weight. She is ill-appearing. HENT:      Head: Normocephalic and atraumatic. Right Ear: Hearing, tympanic membrane, ear canal and external ear normal.      Left Ear: Hearing, tympanic membrane, ear canal and external ear normal.      Nose: Nose normal.      Mouth/Throat:      Lips: Pink. Mouth: Mucous membranes are moist.      Pharynx: Posterior oropharyngeal erythema present. No pharyngeal swelling or oropharyngeal exudate. Tonsils: No tonsillar exudate. Eyes:      Extraocular Movements: Extraocular movements intact. Conjunctiva/sclera: Conjunctivae normal.   Neck:      Musculoskeletal: No muscular tenderness. Cardiovascular:      Rate and Rhythm: Normal rate and regular rhythm. Heart sounds: Normal heart sounds. Pulmonary:      Effort: Pulmonary effort is normal.      Breath sounds: Normal breath sounds. Abdominal:      General: Abdomen is flat. Bowel sounds are normal. There is no distension. Palpations: Abdomen is soft. There is no mass. Tenderness: There is no abdominal tenderness. There is left CVA tenderness. There is no right CVA tenderness, guarding or rebound. Lymphadenopathy:      Cervical: No cervical adenopathy. Skin:     General: Skin is warm and dry.    Neurological:      Mental Status: She is alert and oriented to person, place, and

## 2020-03-12 ENCOUNTER — OFFICE VISIT (OUTPATIENT)
Dept: ORTHOPEDIC SURGERY | Age: 50
End: 2020-03-12
Payer: COMMERCIAL

## 2020-03-12 VITALS — WEIGHT: 120 LBS | BODY MASS INDEX: 22.08 KG/M2 | HEIGHT: 62 IN

## 2020-03-12 PROCEDURE — 99203 OFFICE O/P NEW LOW 30 MIN: CPT | Performed by: PHYSICIAN ASSISTANT

## 2020-03-12 PROCEDURE — 20550 NJX 1 TENDON SHEATH/LIGAMENT: CPT | Performed by: PHYSICIAN ASSISTANT

## 2020-03-12 RX ORDER — LIDOCAINE HYDROCHLORIDE 10 MG/ML
4 INJECTION, SOLUTION INFILTRATION; PERINEURAL ONCE
Status: COMPLETED | OUTPATIENT
Start: 2020-03-12 | End: 2020-03-12

## 2020-03-12 RX ORDER — BETAMETHASONE SODIUM PHOSPHATE AND BETAMETHASONE ACETATE 3; 3 MG/ML; MG/ML
6 INJECTION, SUSPENSION INTRA-ARTICULAR; INTRALESIONAL; INTRAMUSCULAR; SOFT TISSUE ONCE
Status: COMPLETED | OUTPATIENT
Start: 2020-03-12 | End: 2020-03-12

## 2020-03-12 RX ADMIN — BETAMETHASONE SODIUM PHOSPHATE AND BETAMETHASONE ACETATE 6 MG: 3; 3 INJECTION, SUSPENSION INTRA-ARTICULAR; INTRALESIONAL; INTRAMUSCULAR; SOFT TISSUE at 11:20

## 2020-03-12 RX ADMIN — LIDOCAINE HYDROCHLORIDE 1 ML: 10 INJECTION, SOLUTION INFILTRATION; PERINEURAL at 11:21

## 2020-03-12 ASSESSMENT — ENCOUNTER SYMPTOMS
EYES NEGATIVE: 1
VOMITING: 0
COUGH: 0
COLOR CHANGE: 0
NAUSEA: 0
RHINORRHEA: 0

## 2020-03-13 LAB
CULTURE: ABNORMAL
Lab: ABNORMAL
SPECIMEN DESCRIPTION: ABNORMAL

## 2020-03-16 ENCOUNTER — TELEPHONE (OUTPATIENT)
Dept: FAMILY MEDICINE CLINIC | Age: 50
End: 2020-03-16

## 2020-03-16 NOTE — TELEPHONE ENCOUNTER
Patient called and states she needs a letter stating that she cant go back to work until after the 25 th when she sees you in the office.

## 2020-03-17 ENCOUNTER — HOSPITAL ENCOUNTER (OUTPATIENT)
Dept: MRI IMAGING | Facility: CLINIC | Age: 50
Discharge: HOME | End: 2020-03-19
Payer: COMMERCIAL

## 2020-03-17 PROCEDURE — 73218 MRI UPPER EXTREMITY W/O DYE: CPT

## 2020-03-18 ENCOUNTER — TELEPHONE (OUTPATIENT)
Dept: ORTHOPEDIC SURGERY | Age: 50
End: 2020-03-18

## 2020-03-18 NOTE — TELEPHONE ENCOUNTER
Patient is a [employer] employee- I will get the information over to [place], we can try to get the referral made and situated so she can see them-   Informed to stay in her brace that was given           ----- Message from Moshe Benitez sent at 3/18/2020  9:35 AM EDT -----  Please contact patient and update her on the MRI results showing mild partial thickness tear of UCL ligament of thumb.  At this time would recommend wearing thumb spica splint at all times for the next 4 weeks and a referral to Hand specialist at [facility]

## 2020-03-20 ENCOUNTER — TELEPHONE (OUTPATIENT)
Dept: FAMILY MEDICINE CLINIC | Age: 50
End: 2020-03-20

## 2020-03-20 NOTE — TELEPHONE ENCOUNTER
Talked with alicia, unfortunately, [facility] will not accept our insurance. Do we have any other options that you can think of?   Where do we go from here for a UCL Ligament tear

## 2020-03-20 NOTE — TELEPHONE ENCOUNTER
I couldn't find any [facility] paperwork for her. I will call her back and see if she can fax it again. im not sure if Patricia Christiansen was working on it.

## 2020-03-24 NOTE — TELEPHONE ENCOUNTER
Spoke with patient and yes she said this is regarding the urgent care visit from 03/11/2020. She said that her sxs started on 03/10/2020.   She would like to return to work on 03/27/2020

## 2020-03-24 NOTE — TELEPHONE ENCOUNTER
I did just fax her paperwork to the office- this is pending she come to her appointment tomorrow to see Savi Bell to ensure she is well enough to go back, she also needs to sign it. I did ask on cover sheet for  to give paperwork to Patrice Lesches.

## 2020-03-25 ENCOUNTER — OFFICE VISIT (OUTPATIENT)
Dept: PRIMARY CARE CLINIC | Age: 50
End: 2020-03-25
Payer: COMMERCIAL

## 2020-03-25 VITALS
TEMPERATURE: 98.7 F | OXYGEN SATURATION: 97 % | DIASTOLIC BLOOD PRESSURE: 91 MMHG | WEIGHT: 124 LBS | RESPIRATION RATE: 16 BRPM | HEIGHT: 63 IN | HEART RATE: 87 BPM | SYSTOLIC BLOOD PRESSURE: 140 MMHG | BODY MASS INDEX: 21.97 KG/M2

## 2020-03-25 PROCEDURE — 99214 OFFICE O/P EST MOD 30 MIN: CPT | Performed by: NURSE PRACTITIONER

## 2020-03-25 ASSESSMENT — ENCOUNTER SYMPTOMS
COUGH: 0
SORE THROAT: 0
WHEEZING: 0
EYE REDNESS: 0
VOICE CHANGE: 0
SINUS PRESSURE: 0
SHORTNESS OF BREATH: 0
CHEST TIGHTNESS: 0
EYE DISCHARGE: 0

## 2020-03-25 NOTE — PATIENT INSTRUCTIONS
Patient Education        Ear Infection (Otitis Media): Care Instructions  Your Care Instructions    An ear infection may start with a cold and affect the middle ear (otitis media). It can hurt a lot. Most ear infections clear up on their own in a couple of days. Most often you will not need antibiotics. This is because many ear infections are caused by a virus. Antibiotics don't work against a virus. Regular doses of pain medicines are the best way to reduce your fever and help you feel better. Follow-up care is a key part of your treatment and safety. Be sure to make and go to all appointments, and call your doctor if you are having problems. It's also a good idea to know your test results and keep a list of the medicines you take. How can you care for yourself at home? · Take pain medicines exactly as directed. ? If the doctor gave you a prescription medicine for pain, take it as prescribed. ? If you are not taking a prescription pain medicine, take an over-the-counter medicine, such as acetaminophen (Tylenol), ibuprofen (Advil, Motrin), or naproxen (Aleve). Read and follow all instructions on the label. ? Do not take two or more pain medicines at the same time unless the doctor told you to. Many pain medicines have acetaminophen, which is Tylenol. Too much acetaminophen (Tylenol) can be harmful. · Plan to take a full dose of pain reliever before bedtime. Getting enough sleep will help you get better. · Try a warm, moist washcloth on the ear. It may help relieve pain. · If your doctor prescribed antibiotics, take them as directed. Do not stop taking them just because you feel better. You need to take the full course of antibiotics. When should you call for help?   Call your doctor now or seek immediate medical care if:    · You have new or increasing ear pain.     · You have new or increasing pus or blood draining from your ear.     · You have a fever with a stiff neck or a severe headache.    Watch closely for changes in your health, and be sure to contact your doctor if:    · You have new or worse symptoms.     · You are not getting better after taking an antibiotic for 2 days. Where can you learn more? Go to [email or web address].[email or web address] org and sign in to your [employer] account. Enter A897 in the [employer] box to learn more about \"Ear Infection (Otitis Media): Care Instructions. \"     If you do not have an account, please click on the \"Sign Up Now\" link. Current as of: July 28, 2019Content Version: 12.4  © 8006-6848 Healthwise, Incorporated. Care instructions adapted under license by [facility] ([facility]). If you have questions about a medical condition or this instruction, always ask your healthcare professional. [address] any warranty or liability for your use of this information.

## 2020-03-26 ENCOUNTER — TELEMEDICINE (OUTPATIENT)
Dept: FAMILY MEDICINE CLINIC | Age: 50
End: 2020-03-26
Payer: COMMERCIAL

## 2020-03-26 PROCEDURE — 99213 OFFICE O/P EST LOW 20 MIN: CPT | Performed by: NURSE PRACTITIONER

## 2020-03-26 ASSESSMENT — ENCOUNTER SYMPTOMS
SHORTNESS OF BREATH: 0
GASTROINTESTINAL NEGATIVE: 1
RHINORRHEA: 0
COUGH: 1
WHEEZING: 0
SORE THROAT: 0

## 2020-03-26 NOTE — PROGRESS NOTES
Visit Information    Have you changed or started any medications since your last visit including any over-the-counter medicines, vitamins, or herbal medicines? no   Have you stopped taking any of your medications? Is so, why? -  no  Are you having any side effects from any of your medications? - no    Have you seen any other physician or provider since your last visit?  no   Have you had any other diagnostic tests since your last visit?  no   Have you been seen in the emergency room and/or had an admission in a hospital since we last saw you?  yes - Urgent Care  Have you had your routine dental cleaning in the past 6 months?  no     Do you have an active MyChart account? If no, what is the barrier?   Yes    Patient Care Team:  BRET Whitehead CNP as PCP - General (Nurse Practitioner Family)  BRET Whitehead CNP as PCP [facility]    Medical History Review  Past Medical, Family, and Social History reviewed and does contribute to the patient presenting condition    Health Maintenance   Topic Date Due    DTaP/Tdap/Td vaccine (1 - Tdap) 10/17/2020 (Originally 9/27/1989)    A1C test (Diabetic or Prediabetic)  10/21/2020    Cervical cancer screen  09/27/2023    Lipid screen  10/21/2024    Flu vaccine  Completed    Pneumococcal 0-64 years Vaccine  Completed    HIV screen  Completed    Hepatitis A vaccine  Aged Out    Hepatitis B vaccine  Aged Out    Hib vaccine  Aged Out    Meningococcal (ACWY) vaccine  Aged Out

## 2020-03-26 NOTE — PROGRESS NOTES
3/26/2020    TELEHEALTH EVALUATION -- Audio/Visual (During [id number] public health emergency)    HPI:    Nathalia Thomas (:  1970) has requested an audio/video evaluation for the following concern(s):    Patient presented to urgent care on 3-11-20 w/UTI and flu-like symptoms, was tx w/Bactrim DS- flu swab negative. Feeling better from the UTI- denies dysuria, frequency, fever chills. Presented to urgent care on 3-25-20 with right otitis media- tx w/Ciprodex drops. She did not yet  Ciprodex drops- needs to  today. Denies recent travel. Denies known COVID exposure. She does con't to follow up with Ortho regarding partially torn ulnar collateral ligament of thumb- wearing splint continuously. Review of Systems   Constitutional: Negative for chills and fever. HENT: Positive for ear pain (Right ear pain started yesterday after getting water in it with shower). Negative for congestion, hearing loss, postnasal drip, rhinorrhea and sore throat. Respiratory: Positive for cough (Chronic- dry cough, comes and goes). Negative for shortness of breath and wheezing. Cardiovascular: Negative. Gastrointestinal: Negative. Genitourinary: Negative for dysuria, flank pain and frequency. Prior to Visit Medications    Medication Sig Taking? Authorizing Provider   ciprofloxacin-dexamethasone (CIPRODEX) 0.3-0.1 % otic suspension Place 4 drops into the right ear 2 times daily for 7 days Yes Brandon Davis APRN - CNP   phenazopyridine (PYRIDIUM) 100 MG tablet Take 1 tablet by mouth 3 times daily as needed for Pain Yes Galina Ventura MD   ibuprofen (ADVIL;MOTRIN) 800 MG tablet Take 1 tablet by mouth every 8 hours as needed for Pain Yes BRET Whitehead CNP   Thumb Spica MISC by Does not apply route Wear daily as needed for support/pain relief for right thumb pain.  Yes BRET Whitehead CNP   escitalopram (LEXAPRO) 10 MG tablet Take 1 tablet by mouth daily Yes Octavio Tamez vaccine (1 - Tdap) 10/17/2020 (Originally 9/27/1989)    A1C test (Diabetic or Prediabetic)  10/21/2020    Cervical cancer screen  09/27/2023    Lipid screen  10/21/2024    Flu vaccine  Completed    Pneumococcal 0-64 years Vaccine  Completed    HIV screen  Completed    Hepatitis A vaccine  Aged Out    Hepatitis B vaccine  Aged Out    Hib vaccine  Aged Out    Meningococcal (ACWY) vaccine  Aged Out       PHYSICAL EXAMINATION:  [ INSTRUCTIONS:  \"[x]\" Indicates a positive item  \"[]\" Indicates a negative item  -- DELETE ALL ITEMS NOT EXAMINED]  Vital Signs: (As obtained by patient/caregiver or practitioner observation)- unable to complete    Blood pressure-  Heart rate-    Respiratory rate-    Temperature-  Pulse oximetry-     Constitutional: [x] Appears well-developed and well-nourished [x] No apparent distress      [] Abnormal-   Mental status  [x] Alert and awake  [x] Oriented to person/place/time [x]Able to follow commands      Eyes:  Sclera  [x]  Normal  [] Abnormal -         Discharge [x]  None visible  [] Abnormal -     HENT:   [x] Normocephalic, atraumatic. [] Abnormal   [x] Mouth/Throat: Mucous membranes are moist.     External Ears [x] Normal  [] Abnormal-     Neck: [x] No visualized mass     Pulmonary/Chest: [x] Respiratory effort normal.  [x] No visualized signs of difficulty breathing or respiratory distress        [] Abnormal-      Neurological:        [x] No Facial Asymmetry (Cranial nerve 7 motor function) (limited exam to video visit)          [x] No gaze palsy        [] Abnormal-                Psychiatric:       [x] Normal Affect [] No Hallucinations        [] Abnormal-     Other pertinent observable physical exam findings-     Due to this being a TeleHealth encounter, evaluation of the following organ systems is limited: Vitals/Constitutional/EENT/Resp/CV/GI//MS/Neuro/Skin/Heme-Lymph-Imm. ASSESSMENT/PLAN:    1.  Recurrent urinary tract infection  -S/s much improved  -Will re-check UACS as previously ordered  -Previously following up with urology  -Call if s/s return at any time  -Plan to return to work on 3-30-20- will complete paperwork for FMLA as needed- flu like s/s resolved, advised to monitor temperature closely (unable to check temperature during visit), and to call for any worsening cough (chronic r/t COPD) or fever at any time    2. Right otitis media, unspecified otitis media type  -Start Ciprodex drops as ordered  -Established with ENT  -Call if s/s worsen or do not improve    3. Pain of right thumb- Mild-moderate partial-thickness tear of the ulnar collateral ligament of the right thumb per MRI 3-17-20  -Stable: Following with Ortho- con't current tx plan    Advised to complete previously ordered lab work and UACS as ordered. Return in about 2 months (around 5/26/2020) for routine follow up on chronic issues. An  electronic signature was used to authenticate this note. --Mali Prince, BRET - CNP on 3/26/2020 at 1:08 PM    10-15 minutes were spent on the virtual evaluation and management of this patient. }    Pursuant to the emergency declaration under the [facility] waiver authority and the [employer] Act, this Virtual  Visit was conducted, with patient's consent, to reduce the patient's risk of exposure to COVID-19 and provide continuity of care for an established patient. Services were provided through a video synchronous discussion virtually to substitute for in-person clinic visit.

## 2020-03-27 ENCOUNTER — TELEPHONE (OUTPATIENT)
Dept: ORTHOPEDIC SURGERY | Age: 50
End: 2020-03-27

## 2020-03-27 NOTE — TELEPHONE ENCOUNTER
Pt states that she works 7-3:30. .. am I correct the office will not be open/ no providers in in the afternoon due to reduced schedules per the covid 19 outbreak? I will let her know if we are closed that way she will have to take vacation time off.  Or we can wait till this passes over and she can get an afternoon appt     Please address

## 2020-03-31 ENCOUNTER — OFFICE VISIT (OUTPATIENT)
Dept: ORTHOPEDIC SURGERY | Age: 50
End: 2020-03-31
Payer: COMMERCIAL

## 2020-03-31 VITALS — BODY MASS INDEX: 21.95 KG/M2 | HEIGHT: 63 IN | WEIGHT: 123.9 LBS

## 2020-03-31 PROCEDURE — 99213 OFFICE O/P EST LOW 20 MIN: CPT | Performed by: PHYSICIAN ASSISTANT

## 2020-03-31 ASSESSMENT — ENCOUNTER SYMPTOMS
NAUSEA: 0
COUGH: 0
VOMITING: 0
RHINORRHEA: 0
COLOR CHANGE: 0
EYES NEGATIVE: 1

## 2020-03-31 NOTE — PROGRESS NOTES
Patient ID: Pankaj Pham is a 52 y.o. female. Chief Complaint   Patient presents with    Follow-up     Right thumb pain        HPI  Ms. Mejia is a 55-year-old right hand dominant female presents for reevaluation of right thumb pain. Initially evaluated by me on 3/12/2020. She has had pain in this right thumb for approximately 3 months at this point time and did know a [address] injury in early December however did not have pain until about a month after that fall. On evaluation by me 3 weeks ago she was noting triggering and locking of the thumb as well as tenderness at the A1 pulley. She underwent a cortisone injection at that time for trigger thumb. She was given a thumb spica splint at that time which she has been wearing regularly over the past 3 weeks. She also had an MRI which was ordered by her PCP which was completed since she was last seen by me. MRI revealed mild partial-thickness tearing of the ulnar collateral ligament of the thumb otherwise unremarkable MRI. She states since the injection and splinting she has noticed significant improvement in her daily pain. She states the only time she really has pain is when the thumb locks up which is approximately once a day now. She states this is far less frequent than it was 3 weeks ago. She states she is able to \"unlock\" the thumb without the assistance of the other hand which she was unable to do 3 weeks ago. She has pretty much been wearing the thumb spica splint at all times except when she will take it off for bathing purposes or the occasional range of motion exercises. After MRI findings patient was given a referral to a hand specialist per her request however they did not accept her insurance so she is here for follow-up and conservative management of this thumb injury.     Past Medical History:   Diagnosis Date    Arthritis     COPD (chronic obstructive pulmonary disease) ([facility] [address]     Former smoker     MI, old Past Surgical History:   Procedure Laterality Date    HYSTERECTOMY      TYMPANOSTOMY TUBE PLACEMENT      About 3 years ago per patient- b/l     Family History   Problem Relation Age of Onset    High Blood Pressure Father     Cancer Paternal Uncle     Cancer Paternal Grandmother      Social History     Occupational History    Not on file   Tobacco Use    Smoking status: Current Every Day Smoker     Packs/day: 0.25     Types: Cigarettes    Smokeless tobacco: Never Used    Tobacco comment: pt smokes about 1 cigg daily   Substance and Sexual Activity    Alcohol use: No    Drug use: No    Sexual activity: Not on file        Review of Systems   Constitutional: Negative for chills and fever. HENT: Negative for congestion and rhinorrhea. Eyes: Negative. Respiratory: Negative for cough. Cardiovascular: Negative for chest pain and leg swelling. Gastrointestinal: Negative for nausea and vomiting. Musculoskeletal: Positive for arthralgias (Right Thumb). Skin: Negative for color change and rash. Neurological: Negative for dizziness and numbness. Physical Exam  Constitutional:       Appearance: She is well-developed. HENT:      Head: Normocephalic and atraumatic. Neck:      Musculoskeletal: Normal range of motion and neck supple. Cardiovascular:      Rate and Rhythm: Normal rate. Pulmonary:      Effort: Pulmonary effort is normal.   Abdominal:      Palpations: Abdomen is soft. Musculoskeletal:      Comments: right Hand/Wrist   Tenderness:  No tenderness to the radial or ulnar side of the IP or the MP joint of the first digit. Mild tenderness to the A1 pulley of the first digit. No tenderness to the MCPs or the IP joints of any of the other digits. No tenderness at the wrist     Range of Motion:     Pronation: 90    Supination: 90    Flexion: 90    Extension: 70    Hand Joints: abnormal: She has mild decreased flexion secondary to pain of the first IP joint.   No palpable triggering on examination today. No pain with circumduction or resisted abduction of the MP or CMC joints. Muscle Strength     : 4/5    Wrist Extension: 5/5    Wrist Flexion: 5/5     Sensation: normal  Phalen's Sign: Negative  Tinel's Sign (Medial Nerve): Negative  Finkelstein's Test: Negative    No instability with lateral translation at the IP or the MP joint. No gross instability of the UCL or the ulnar RCL of the thumb     Skin:     General: Skin is warm and dry. Findings: No rash. Neurological:      Mental Status: She is alert and oriented to person, place, and time. Psychiatric:         Behavior: Behavior normal.         Assessment:     Encounter Diagnosis   Name Primary?     Skivinh's thumb, right, subsequent encounter Yes     Previous Imaging:  3/9/2020 XR right hand and XR right wrist:  FINDINGS:   Right wrist:       3 images were provided. Emily Monday is no gross malalignment.  There is no   fracture.       Right hand:       3 images were provided.  Degenerative changes in the thumb metacarpal   phalangeal joint are noted.  Subtle loss of bone density is noted in the   distal aspect of the proximal phalanx and the distal phalanx.  There is a   suggested tiny focus of rounded lucency in the proximal phalangeal head of   the thumb.  There is also a questionable small focus of lucency along the   articular surface of the distal phalanx along the ulnar aspect.  There is no   acute displaced fracture noted.           Impression   No acute osseous abnormality in the right wrist       There is developing osteopenia in the thumb proximal phalanx and distal   phalanx without periosteal reaction.  These findings are indeterminate may be   related to disuse osteopenia.  However, if the patient had microscopic   trabecular fracture, this could be related to bone resorption related to the   injury.  If the patient had penetrating trauma at the time of initial injury,   it is possible that this is related to

## 2020-04-06 ENCOUNTER — TELEPHONE (OUTPATIENT)
Dept: ORTHOPEDIC SURGERY | Age: 50
End: 2020-04-06

## 2020-05-21 ENCOUNTER — TELEMEDICINE (OUTPATIENT)
Dept: FAMILY MEDICINE CLINIC | Age: 50
End: 2020-05-21
Payer: COMMERCIAL

## 2020-05-21 PROCEDURE — 99213 OFFICE O/P EST LOW 20 MIN: CPT | Performed by: NURSE PRACTITIONER

## 2020-05-21 ASSESSMENT — ENCOUNTER SYMPTOMS
WHEEZING: 0
RESPIRATORY NEGATIVE: 1
SHORTNESS OF BREATH: 0
COUGH: 0

## 2020-05-21 NOTE — PROGRESS NOTES
Visit Information    Have you changed or started any medications since your last visit including any over-the-counter medicines, vitamins, or herbal medicines? no   Have you stopped taking any of your medications? Is so, why? -  no  Are you having any side effects from any of your medications? - no    Have you seen any other physician or provider since your last visit?  no   Have you had any other diagnostic tests since your last visit?  no   Have you been seen in the emergency room and/or had an admission in a hospital since we last saw you?  no   Have you had your routine dental cleaning in the past 6 months?  no     Do you have an active MyChart account? If no, what is the barrier?   Yes    Patient Care Team:  BRET Nicole CNP as PCP - General (Nurse Practitioner Family)  BRET Nicole CNP as PCP - [facility]    Medical History Review  Past Medical, Family, and Social History reviewed and does contribute to the patient presenting condition    Health Maintenance   Topic Date Due    DTaP/Tdap/Td vaccine (1 - Tdap) 10/17/2020 (Originally 9/27/1989)    A1C test (Diabetic or Prediabetic)  10/21/2020    Cervical cancer screen  09/27/2023    Lipid screen  10/21/2024    Flu vaccine  Completed    Pneumococcal 0-64 years Vaccine  Completed    HIV screen  Completed    Hepatitis A vaccine  Aged Out    Hepatitis B vaccine  Aged Out    Hib vaccine  Aged Out    Meningococcal (ACWY) vaccine  Aged Out

## 2020-05-21 NOTE — PROGRESS NOTES
 High Blood Pressure Father     Cancer Paternal Uncle     Cancer Paternal Grandmother    ,   Immunization History   Administered Date(s) Administered    Influenza Virus Vaccine 10/09/2017, 10/23/2018, 10/03/2019    Pneumococcal Polysaccharide ([id number]) 07/18/2018   ,   Health Maintenance   Topic Date Due    DTaP/Tdap/Td vaccine (1 - Tdap) 10/17/2020 (Originally 9/27/1989)    A1C test (Diabetic or Prediabetic)  10/21/2020    Cervical cancer screen  09/27/2023    Lipid screen  10/21/2024    Flu vaccine  Completed    Pneumococcal 0-64 years Vaccine  Completed    HIV screen  Completed    Hepatitis A vaccine  Aged Out    Hepatitis B vaccine  Aged Out    Hib vaccine  Aged Out    Meningococcal (ACWY) vaccine  Aged Out       PHYSICAL EXAMINATION:  [ INSTRUCTIONS:  \"[x]\" Indicates a positive item  \"[]\" Indicates a negative item  -- DELETE ALL ITEMS NOT EXAMINED]  Vital Signs: Not completed due to virtual visit. Constitutional: [x] Appears well-developed and well-nourished [x] No apparent distress      [] Abnormal-   Mental status  [x] Alert and awake  [x] Oriented to person/place/time [x]Able to follow commands      Eyes:  EOM    [x]  Normal  [] Abnormal-  Sclera  [x]  Normal  [] Abnormal -         Discharge [x]  None visible  [] Abnormal -    HENT:   [x] Normocephalic, atraumatic.   [] Abnormal   [x] Mouth/Throat: Mucous membranes are moist.     External Ears [x] Normal  [] Abnormal-     Neck: [x] No visualized mass     Pulmonary/Chest: [x] Respiratory effort normal.  [x] No visualized signs of difficulty breathing or respiratory distress        [] Abnormal-     Neurological:        [x] No Facial Asymmetry (Cranial nerve 7 motor function) (limited exam to video visit)          [x] No gaze palsy        [] Abnormal-         Skin:        [x] No significant exanthematous lesions or discoloration noted on facial skin         [] Abnormal-            Psychiatric:       [x] Normal Affect [x] No Hallucinations        [] Abnormal-     Other pertinent observable physical exam findings- Patient appears generally well, is speaking full sentences clearly without any observable SOB, no cough, no diaphoresis. ASSESSMENT/PLAN:  1. Chronic obstructive pulmonary disease, unspecified COPD type ([facility] [address]  -Stable: Con't all medications as ordered, con't f/u with pulmonology as scheduled, con't current tx plan  - VENTOLIN  (90 Base) MCG/ACT inhaler; Inhale 2 puffs into the lungs every 4 hours as needed for Wheezing or Shortness of Breath  Dispense: 1 Inhaler; Refill: 1    2. Pain of right thumb- Mild-moderate partial-thickness tear of the ulnar collateral ligament of the right thumb per MRI 3-17-20  -Stable: Con't all medications as ordered, con't f/u with Ortho as scheduled, con't current tx plan    3. Agitation  4. Mood swings  -Stable: Con't all medications as ordered, con't current tx plan    5. Hot flashes, menopausal  -Will refer to OBGYN for further evaluation of hormone tx  - [address] [facility], [address] [place] [address]    -Encouraged to complete previously ordered labs once COVID-19 pandemic has resolved    Return in about 3 months (around 8/21/2020) for routine follow up on chronic issues. Randa Good is a 52 y.o. female being evaluated by a Virtual Visit (video visit) encounter to address concerns as mentioned above. A caregiver was present when appropriate. Due to this being a TeleHealth encounter (During [employer] [facility] public health emergency), evaluation of the following organ systems was limited: Vitals/Constitutional/EENT/Resp/CV/GI//MS/Neuro/Skin/Heme-Lymph-Imm.   Pursuant to the emergency declaration under the [facility] authority and the [employer] and [employer] Act, this Virtual Visit was conducted with patient's (and/or legal guardian's) consent, to reduce the patient's risk of exposure to COVID-19 and provide necessary medical care. The patient (and/or legal guardian) has also been advised to contact this office for worsening conditions or problems, and seek emergency medical treatment and/or call 911 if deemed necessary. Patient identification was verified at the start of the visit: Yes    Total time spent on this encounter:   11-20 minutes were spent on the digital evaluation and management of this patient. Services were provided through a video synchronous discussion virtually to substitute for in-person clinic visit. Patient and provider were located at their individual homes. --BRET Padilla CNP on 5/21/2020 at 2:43 PM    An electronic signature was used to authenticate this note.

## 2020-06-17 ENCOUNTER — HOSPITAL ENCOUNTER (OUTPATIENT)
Age: 50
Discharge: HOME | End: 2020-06-17
Payer: COMMERCIAL

## 2020-06-17 PROCEDURE — U0004 COV-19 TEST NON-CDC HGH THRU: HCPCS

## 2020-06-19 LAB
SARS-COV-2, PCR: NOT DETECTED
SARS-COV-2, RAPID: NORMAL
SARS-COV-2: NORMAL
SOURCE: NORMAL

## 2020-06-20 ENCOUNTER — TELEPHONE (OUTPATIENT)
Dept: PRIMARY CARE CLINIC | Age: 50
End: 2020-06-20

## 2020-08-13 ENCOUNTER — OFFICE VISIT (OUTPATIENT)
Dept: FAMILY MEDICINE CLINIC | Age: 50
End: 2020-08-13
Payer: COMMERCIAL

## 2020-08-13 VITALS
OXYGEN SATURATION: 98 % | WEIGHT: 122 LBS | RESPIRATION RATE: 16 BRPM | BODY MASS INDEX: 21.62 KG/M2 | SYSTOLIC BLOOD PRESSURE: 120 MMHG | DIASTOLIC BLOOD PRESSURE: 78 MMHG | TEMPERATURE: 97.3 F | HEART RATE: 92 BPM

## 2020-08-13 PROBLEM — S63.641D: Status: ACTIVE | Noted: 2020-08-13

## 2020-08-13 PROBLEM — F17.200 SMOKES AND MOTIVATED TO QUIT: Status: ACTIVE | Noted: 2018-12-06

## 2020-08-13 PROCEDURE — 99214 OFFICE O/P EST MOD 30 MIN: CPT | Performed by: NURSE PRACTITIONER

## 2020-08-13 RX ORDER — ESCITALOPRAM OXALATE 10 MG/1
10 TABLET ORAL DAILY
Qty: 90 TABLET | Refills: 1 | Status: SHIPPED | OUTPATIENT
Start: 2020-08-13

## 2020-08-13 RX ORDER — NICOTINE 21 MG/24HR
1 PATCH, TRANSDERMAL 24 HOURS TRANSDERMAL EVERY 24 HOURS
Qty: 45 PATCH | Refills: 0 | Status: SHIPPED | OUTPATIENT
Start: 2020-08-13 | End: 2020-12-16

## 2020-08-13 ASSESSMENT — ENCOUNTER SYMPTOMS
CONSTIPATION: 0
ABDOMINAL PAIN: 0
DIARRHEA: 0
ABDOMINAL DISTENTION: 1
NAUSEA: 0
RESPIRATORY NEGATIVE: 1
BLOOD IN STOOL: 0
VOMITING: 0

## 2020-08-13 NOTE — PROGRESS NOTES
Rae Dad. Harriet Gustavophyllis, APRN-CNP  [address], [address]  [facility] [address]  Dept: [phone]  Dept Fax: [phone]    HPI:   /78   Pulse 92   Temp 97.3 °F (36.3 °C)   Resp 16   Wt 122 lb (55.3 kg)   SpO2 98%   BMI 21.62 kg/m²      Berl Parent is a 52 y.o. female who presents today for her medical conditions/complaintsas noted below. Berl Parent is c/o of 3 Month Follow-Up; Hand Pain; and Medication Refill    HPI  -Needs to follow up with Ortho- was following w/ZSumi Morris PA-C- cannot move right thumb at this time, this has been a chronic issue, she feels like it is stuck  -She does try stretches at home  -Was previously given a splint, but did not seem to help, does not bend at all now  -She notes pain if she presses on it or bumps it    -She ran out of Lexapro about a month ago  -She does feel mood was more controlled w/Lexapro  -She does con't to get hot flashes frequently   -Denies any thoughts of harming themselves or others  Past Medical History:   Diagnosis Date    Arthritis     COPD [facility]chronic obstructive pulmonary disease) [facility] [address]     Former smoker     MI, old       Past Surgical History:   Procedure Laterality Date    HYSTERECTOMY      TYMPANOSTOMY TUBE PLACEMENT      About 3 years ago per patient- b/l       Family History   Problem Relation Age of Onset    High Blood Pressure Father     Cancer Paternal Uncle     Cancer Paternal Grandmother        Social History     Tobacco Use    Smoking status: Current Every Day Smoker     Packs/day: 0.25     Types: Cigarettes    Smokeless tobacco: Never Used    Tobacco comment: pt smokes about 1 cigg daily   Substance Use Topics    Alcohol use: No      Current Outpatient Medications   Medication Sig Dispense Refill    escitalopram (LEXAPRO) 10 MG tablet Take 1 tablet by mouth daily 90 tablet 1    nicotine (NICODERM CQ) 21 MG/24HR Place 1 patch onto the skin every 24 hours 45 patch 0    VENTOLIN  (90 Base) MCG/ACT inhaler Inhale 2 puffs into the lungs every 4 hours as needed for Wheezing or Shortness of Breath 1 Inhaler 1    ibuprofen (ADVIL;MOTRIN) 800 MG tablet Take 1 tablet by mouth every 8 hours as needed for Pain 90 tablet 0    meclizine (ANTIVERT) 25 MG tablet Take 25 mg by mouth 3 times daily as needed for Dizziness or Nausea       glycopyrrolate-formoterol (BEVESPI) 9-4.8 MCG/ACT AERO Inhale 2 puffs into the lungs 2 times daily       No current facility-administered medications for this visit. No Known Allergies    Health Maintenance   Topic Date Due    DTaP/Tdap/Td vaccine (1 - Tdap) 10/17/2020 (Originally 9/27/1989)    Flu vaccine (1) 09/01/2020    A1C test (Diabetic or Prediabetic)  10/21/2020    Cervical cancer screen  09/27/2023    Lipid screen  10/21/2024    Pneumococcal 0-64 years Vaccine  Completed    HIV screen  Completed    Hepatitis A vaccine  Aged Out    Hepatitis B vaccine  Aged Out    Hib vaccine  Aged Out    Meningococcal (ACWY) vaccine  Aged Out     Subjective:   Review of Systems   Constitutional: Positive for diaphoresis. Respiratory: Negative. Cardiovascular: Negative. Gastrointestinal: Positive for abdominal distention (C/o chronic ABD bloating). Negative for abdominal pain, blood in stool, constipation, diarrhea, nausea and vomiting. Endocrine: Positive for heat intolerance. Genitourinary: Negative for dysuria and frequency. Psychiatric/Behavioral: Positive for agitation and dysphoric mood. Negative for self-injury and suicidal ideas. The patient is not nervous/anxious. Objective:   /78   Pulse 92   Temp 97.3 °F (36.3 °C)   Resp 16   Wt 122 lb (55.3 kg)   SpO2 98%   BMI 21.62 kg/m²   Physical Exam  Vitals signs and nursing note reviewed. Constitutional:       Appearance: Normal appearance. She is well-developed. She is not ill-appearing or toxic-appearing.    HENT:      Head: Normocephalic and atraumatic. Right Ear: External ear normal.      Left Ear: External ear normal.   Eyes:      Conjunctiva/sclera: Conjunctivae normal.   Neck:      Musculoskeletal: Normal range of motion. Cardiovascular:      Rate and Rhythm: Normal rate and regular rhythm. Pulses:           Radial pulses are 2+ on the right side. Heart sounds: Normal heart sounds. No murmur. No friction rub. No gallop. Pulmonary:      Effort: Pulmonary effort is normal. No accessory muscle usage or respiratory distress. Breath sounds: Normal breath sounds. No decreased breath sounds, wheezing, rhonchi or rales. Abdominal:      General: Abdomen is flat. Bowel sounds are normal. There is no distension. Palpations: Abdomen is soft. Tenderness: There is no abdominal tenderness. Hernia: No hernia is present. Musculoskeletal:      Right hand: She exhibits decreased range of motion (Thumb- very restricted ROM), tenderness and bony tenderness (Thumb). She exhibits normal two-point discrimination, normal capillary refill, no deformity, no laceration and no swelling. Normal sensation noted. Skin:     General: Skin is warm and dry. Findings: No erythema or rash. Neurological:      Mental Status: She is alert and oriented to person, place, and time.       Gait: Gait normal.   Psychiatric:         Attention and Perception: Attention normal.         Mood and Affect: Mood normal.         Speech: Speech normal.         Behavior: Behavior normal.       Data:     Lab Results   Component Value Date     10/21/2019    K 4.0 10/21/2019     10/21/2019    CO2 25 10/21/2019    BUN 8 10/21/2019    CREATININE 0.90 10/21/2019    GLUCOSE 115 10/21/2019    PROT 7.3 10/21/2019    LABALBU 3.4 10/21/2019    BILITOT 0.38 10/21/2019    ALKPHOS 75 10/21/2019    AST 10 10/21/2019    ALT 9 10/21/2019     Lab Results   Component Value Date    WBC 14.6 10/21/2019    RBC 4.24 10/21/2019    HGB 12.3 10/21/2019    HCT 37.3 10/21/2019    MCV 88.0 10/21/2019    MCH 29.0 10/21/2019    MCHC 33.0 10/21/2019    RDW 14.8 10/21/2019     10/21/2019    MPV 9.2 10/21/2019     Lab Results   Component Value Date    TSH 3.21 10/21/2019     Lab Results   Component Value Date    CHOL 107 10/21/2019    HDL 44 10/21/2019    LABA1C 5.8 10/21/2019     Assessment & Plan:      1. Mood swings  2. Agitation  -Re-start Lexapro as previously ordered- feels mood was improved w/Lexapro  - escitalopram (LEXAPRO) 10 MG tablet; Take 1 tablet by mouth daily  Dispense: 90 tablet; Refill: 1  -Avoid taking w/IBU- discussed possible side effects- GI upset/bleeding    3. Hot flashes, menopausal  -S/s improved w/Lexapro  -F/u with [facility] ASAP  -See if urology wants her to con't vaginal estrogen  - escitalopram (LEXAPRO) 10 MG tablet; Take 1 tablet by mouth daily  Dispense: 90 tablet; Refill: 1    4. Pain of right thumb- Mild-moderate partial-thickness tear of the ulnar collateral ligament of the right thumb per MRI 3-17-20  5. Skier's thumb, right, subsequent encounter  -Worsening- follow up with Ortho as previously referred  - Jose Luis , [employer], [place], [facility]    6. Abdominal bloating  -Labs and testing as follows:  - Comprehensive Metabolic Panel; Future  - CBC Auto Differential; Future  - CT ABDOMEN PELVIS W IV CONTRAST Additional Contrast? Oral; Future    7. Smokes and motivated to quit  -Smoking cessation discussed and encouraged  -Start Nicoderm patches as ordered, use discussed/do not smoke while using  - nicotine (NICODERM CQ) 21 MG/24HR; Place 1 patch onto the skin every 24 hours  Dispense: 45 patch; Refill: 0    8. Prediabetes  -Will re-check labs:  - Hemoglobin A1C; Future    9. Leukocytosis, unspecified type  -Will re-check labs:  - CBC Auto Differential; Future    10. Vitamin D deficiency  -Will re-check labs:  - Vitamin D 25 Hydroxy; Future    11.  Preventative health care  -Labs as follows:  - Comprehensive Metabolic Panel; Future  - Hemoglobin A1C; Future  - Lipid Panel; Future  - TSH with Reflex; Future  - Vitamin D 25 Hydroxy; Future  - Urinalysis Reflex to Culture; Future  - CBC Auto Differential; Future  - Be Well Health Screen; Future    Return in about 3 months (around 11/13/2020) for routine follow up on chronic issues. Discussed use, benefit, and side effects of prescribed medications. Barriers to medication compliance addressed. All patient questions answered, patient voiced understanding. Alexandra Comer.  Lynsey Acevedo, APRN-CNP

## 2020-08-13 NOTE — PROGRESS NOTES
Visit Information    Have you changed or started any medications since your last visit including any over-the-counter medicines, vitamins, or herbal medicines? no   Have you stopped taking any of your medications? Is so, why? -  no  Are you having any side effects from any of your medications? - no    Have you seen any other physician or provider since your last visit?  no   Have you had any other diagnostic tests since your last visit?  no   Have you been seen in the emergency room and/or had an admission in a hospital since we last saw you?  no   Have you had your routine dental cleaning in the past 6 months?  no     Do you have an active MyChart account? If no, what is the barrier?   Yes    Patient Care Team:  BRET Guzmán CNP as PCP - General (Nurse Practitioner Family)  BRET Guzmán CNP as PCP - [facility] Provider    Medical History Review  Past Medical, Family, and Social History reviewed and does contribute to the patient presenting condition    Health Maintenance   Topic Date Due    DTaP/Tdap/Td vaccine (1 - Tdap) 10/17/2020 (Originally 9/27/1989)    Flu vaccine (1) 09/01/2020    A1C test (Diabetic or Prediabetic)  10/21/2020    Cervical cancer screen  09/27/2023    Lipid screen  10/21/2024    Pneumococcal 0-64 years Vaccine  Completed    HIV screen  Completed    Hepatitis A vaccine  Aged Out    Hepatitis B vaccine  Aged Out    Hib vaccine  Aged Out    Meningococcal (ACWY) vaccine  Aged Out

## 2020-08-14 ENCOUNTER — TELEPHONE (OUTPATIENT)
Dept: ORTHOPEDIC SURGERY | Age: 50
End: 2020-08-14

## 2020-08-18 ENCOUNTER — OFFICE VISIT (OUTPATIENT)
Dept: ORTHOPEDIC SURGERY | Age: 50
End: 2020-08-18
Payer: COMMERCIAL

## 2020-08-18 VITALS — BODY MASS INDEX: 22.15 KG/M2 | HEIGHT: 63 IN | WEIGHT: 125 LBS

## 2020-08-18 PROCEDURE — 99213 OFFICE O/P EST LOW 20 MIN: CPT | Performed by: PHYSICIAN ASSISTANT

## 2020-08-18 ASSESSMENT — ENCOUNTER SYMPTOMS
EYES NEGATIVE: 1
VOMITING: 0
COLOR CHANGE: 0
NAUSEA: 0
RHINORRHEA: 0
COUGH: 0

## 2020-08-18 NOTE — PROGRESS NOTES
Patient ID: Tr Gómez is a 52 y.o. female. Chief Complaint   Patient presents with    New Patient     right thumb wont move         HPI  Please see previous clinic notes    Ms. Yaquelin Patterson is a 31-year-old female presents for reevaluation of continued right thumb pain. Patient has been evaluated several times in the past by me for this right thumb pain she was found to have both a trigger thumb as well as a partial ulnar collateral ligament injury of the right thumb seen on MRI ordered by her PCP earlier this year. Patient has no pain over the ulnar MP of the right thumb. Patient's main complaint today is pain to the volar aspect of the right thumb as well as inability to flex the thumb. Also associated with intermittent triggering in which she has to Palmer Liz thumb with her other hand. Patient has tried extensive home exercises as well as thumb splinting with minimal relief of her pain. Patient returns today as the symptoms seem to gradually worsening and are now interfering with ADLs and work. She did undergo a right trigger thumb injection on 3/12/2020 by myself which did provide quite a bit of relief of her pain however her triggering of this right thumb never really stopped.     Past Medical History:   Diagnosis Date    Arthritis     COPD (chronic obstructive pulmonary disease) ([facility] [address]     Former smoker     MI, old      Past Surgical History:   Procedure Laterality Date    HYSTERECTOMY      TYMPANOSTOMY TUBE PLACEMENT      About 3 years ago per patient- b/l     Family History   Problem Relation Age of Onset    High Blood Pressure Father     Cancer Paternal Uncle     Cancer Paternal Grandmother      Social History     Occupational History    Not on file   Tobacco Use    Smoking status: Current Every Day Smoker     Packs/day: 0.25     Types: Cigarettes    Smokeless tobacco: Never Used    Tobacco comment: pt smokes about 1 cigg daily   Substance and Sexual Activity    Alcohol use: No    Drug use: No    Sexual activity: Not on file        Review of Systems   Constitutional: Negative for chills and fever. HENT: Negative for congestion and rhinorrhea. Eyes: Negative. Respiratory: Negative for cough. Cardiovascular: Negative for chest pain and leg swelling. Gastrointestinal: Negative for nausea and vomiting. Musculoskeletal: Positive for arthralgias (Right thumb pain and triggering). Skin: Negative for color change and rash. Neurological: Negative for dizziness and numbness. Physical Exam  Constitutional:       Appearance: She is well-developed. HENT:      Head: Normocephalic and atraumatic. Neck:      Musculoskeletal: Normal range of motion and neck supple. Cardiovascular:      Rate and Rhythm: Normal rate. Pulmonary:      Effort: Pulmonary effort is normal.   Abdominal:      Palpations: Abdomen is soft. Musculoskeletal:      Comments:   Right Hand  Circulation:  warm, well perfused, brisk capillary refill with normal radial and ulnar pulses  Sensation:    intact to light touch  Strength:   Normal wrist and finger strength to flexion and extension  Wrist ROM:  100% of normal on flexion and extension  Finger ROM:  Patient only has approximately 10 degrees of flexion at the thumb IP joint. Full flexion at the MP joint. Normal range of motion of all other digits. Power :   grinding, catching, locking of the Thumb is appreciated the patient was not able to straighten the digit without the use of their other hand. Tenderness:  localized to the distal palm  Nodules:   Palpable nodule that was tender to palpation of the A1 pulley right thumb    Skin:     General: Skin is warm and dry. Findings: No rash. Neurological:      Mental Status: She is alert and oriented to person, place, and time. Psychiatric:         Behavior: Behavior normal.         Assessment:     Encounter Diagnosis   Name Primary?     Trigger finger of right thumb Yes         No new x-rays are taken in clinic today    Previous Imaging:  3/9/2020 XR right hand  Impression    No acute osseous abnormality in the right wrist         There is developing osteopenia in the thumb proximal phalanx and distal    phalanx without periosteal reaction.  These findings are indeterminate may be    related to disuse osteopenia.  However, if the patient had microscopic    trabecular fracture, this could be related to bone resorption related to the    injury.  If the patient had penetrating trauma at the time of initial injury,    it is possible that this is related to osteomyelitis.  Correlate clinically.         Although unusual, reflex sympathetic dystrophy can have this appearance given    the history of crushing injury and developing periarticular osteopenia. 3/17/2020 MRI Right hand:  Impression    Mild-moderate partial-thickness tear of the ulnar collateral ligament of the    thumb. Plan:      I discussed the entity of trigger finger with the patient. I fully outlined the mechanics behind the triggering and locking. All of her questions were answered fully. At this point since the patient is experiencing progressively worsening triggering and she has failed conservative management, patient will likely benefit from a right thumb trigger finger release. Recommend follow-up with Dr. Nacho Oneal for discussion of further treatment options      No orders of the defined types were placed in this encounter. [place], [place]    Please note that this chart was generated using voicerecognition Dragon dictation software. Although every effort was made to ensurethe accuracy of this automated transcription, some errors in transcription may haveoccurred.

## 2020-08-27 ENCOUNTER — OFFICE VISIT (OUTPATIENT)
Dept: ORTHOPEDIC SURGERY | Age: 50
End: 2020-08-27
Payer: COMMERCIAL

## 2020-08-27 VITALS — BODY MASS INDEX: 22.14 KG/M2 | WEIGHT: 125 LBS

## 2020-08-27 PROCEDURE — 99213 OFFICE O/P EST LOW 20 MIN: CPT | Performed by: ORTHOPAEDIC SURGERY

## 2020-08-27 NOTE — PROGRESS NOTES
Patient ID: Beverly Burgess is a 52 y.o. female. Chief Complaint   Patient presents with    Follow-up     thumb trigger finger        HPI     Patient presents with a left trigger thumb      Patient with painful flexion inability to fully flex her left thumb    Past Medical History:   Diagnosis Date    Arthritis     COPD (chronic obstructive pulmonary disease) ([address]     Former smoker     MI, old      Past Surgical History:   Procedure Laterality Date    HYSTERECTOMY      TYMPANOSTOMY TUBE PLACEMENT      About 3 years ago per patient- b/l     Family History   Problem Relation Age of Onset    High Blood Pressure Father     Cancer Paternal Uncle     Cancer Paternal Grandmother      Social History     Occupational History    Not on file   Tobacco Use    Smoking status: Current Every Day Smoker     Packs/day: 0.25     Types: Cigarettes    Smokeless tobacco: Never Used    Tobacco comment: pt smokes about 1 cigg daily   Substance and Sexual Activity    Alcohol use: No    Drug use: No    Sexual activity: Not on file        Review of Systems         Physical Exam  Vitals signs and nursing note reviewed. Constitutional:       Appearance: She is well-developed. HENT:      Head: Normocephalic and atraumatic. Nose: Nose normal.   Eyes:      Conjunctiva/sclera: Conjunctivae normal.   Neck:      Musculoskeletal: Normal range of motion and neck supple. Pulmonary:      Effort: Pulmonary effort is normal. No respiratory distress. Musculoskeletal:      Comments: Normal gait     Skin:     General: Skin is warm and dry. Neurological:      Mental Status: She is alert and oriented to person, place, and time. Sensory: No sensory deficit. Psychiatric:         Behavior: Behavior normal.         Thought Content:  Thought content normal.       MRI right hand March reviewed patient with a partial ulnar collateral ligament injury    Physical exam patient with severe guarding of thumb    Diagnostic

## 2020-08-31 NOTE — PROGRESS NOTES
Preoperative Instructions:    Stop eating solid foods at midnight the night prior to your surgery. Stop drinking clear liquids at midnight the night prior to your surgery. Arrive at the surgery center (3rd entrance) on ___9-10-2020____________ by __0600 please wear a mask._____________. Please stop any blood thinning medications as directed by your surgeon or prescribing physician. Failure to stop certain medications may interfere with your scheduled surgery. These may include: Aspirin, Coumadin, Plavix, NSAIDS (Motrin, Aleve, Advil, Mobic, Celebrex), Eliquis, Pradaxa, Xarelto, Fish oil, and herbal supplements. You may continue the rest of your medications through the night before surgery unless instructed otherwise. Day of surgery please take only the following medication(s) with a small sip of water:Lexapro      Please use and bring inhalers the day of surgery. Bevespi (Ventolin if needed)    Please shower with antibacterial soap and water. Reminders:  -If you are going home the day of your procedure, you will need a family member or friend to stay during the procedure and drive you home after your procedure. Your  must be 25years of age or older and able to sign off on your discharge instructions.    -If you are going home the same day of your surgery, someone must remain with you for the first 24 hours after your surgery if you receive sedation or anesthesia.      -Please do not wear any jewelery, lotions, contacts or body piercing the day of surgery

## 2020-09-03 ENCOUNTER — ANESTHESIA EVENT (OUTPATIENT)
Dept: OPERATING ROOM | Age: 50
End: 2020-09-03
Payer: COMMERCIAL

## 2020-09-06 ENCOUNTER — HOSPITAL ENCOUNTER (OUTPATIENT)
Dept: PREADMISSION TESTING | Age: 50
Discharge: HOME | End: 2020-09-10
Payer: COMMERCIAL

## 2020-09-06 PROCEDURE — U0003 INFECTIOUS AGENT DETECTION BY NUCLEIC ACID (DNA OR RNA); SEVERE ACUTE RESPIRATORY SYNDROME CORONAVIRUS 2 (SARS-COV-2) (CORONAVIRUS DISEASE [COVID-19]), AMPLIFIED PROBE TECHNIQUE, MAKING USE OF HIGH THROUGHPUT TECHNOLOGIES AS DESCRIBED BY CMS-2020-01-R: HCPCS

## 2020-09-09 LAB — SARS-COV-2, NAA: NOT DETECTED

## 2020-09-10 ENCOUNTER — ANESTHESIA (OUTPATIENT)
Dept: OPERATING ROOM | Age: 50
End: 2020-09-10
Payer: COMMERCIAL

## 2020-09-10 ENCOUNTER — HOSPITAL ENCOUNTER (OUTPATIENT)
Age: 50
Setting detail: OUTPATIENT SURGERY
Discharge: HOME | End: 2020-09-10
Attending: ORTHOPAEDIC SURGERY | Admitting: ORTHOPAEDIC SURGERY
Payer: COMMERCIAL

## 2020-09-10 VITALS
TEMPERATURE: 97.2 F | HEART RATE: 75 BPM | SYSTOLIC BLOOD PRESSURE: 120 MMHG | DIASTOLIC BLOOD PRESSURE: 73 MMHG | BODY MASS INDEX: 22.18 KG/M2 | OXYGEN SATURATION: 96 % | WEIGHT: 125.2 LBS | RESPIRATION RATE: 18 BRPM | HEIGHT: 63 IN

## 2020-09-10 VITALS
SYSTOLIC BLOOD PRESSURE: 114 MMHG | TEMPERATURE: 96.4 F | RESPIRATION RATE: 21 BRPM | OXYGEN SATURATION: 99 % | DIASTOLIC BLOOD PRESSURE: 73 MMHG

## 2020-09-10 PROBLEM — M65.311 TRIGGER FINGER OF RIGHT THUMB: Status: ACTIVE | Noted: 2020-09-10

## 2020-09-10 PROCEDURE — 2500000003 HC RX 250 WO HCPCS: Performed by: NURSE ANESTHETIST, CERTIFIED REGISTERED

## 2020-09-10 PROCEDURE — 3700000001 HC ADD 15 MINUTES (ANESTHESIA): Performed by: ORTHOPAEDIC SURGERY

## 2020-09-10 PROCEDURE — 2709999900 HC NON-CHARGEABLE SUPPLY: Performed by: ORTHOPAEDIC SURGERY

## 2020-09-10 PROCEDURE — 6360000002 HC RX W HCPCS: Performed by: NURSE ANESTHETIST, CERTIFIED REGISTERED

## 2020-09-10 PROCEDURE — 3700000000 HC ANESTHESIA ATTENDED CARE: Performed by: ORTHOPAEDIC SURGERY

## 2020-09-10 PROCEDURE — 7100000001 HC PACU RECOVERY - ADDTL 15 MIN: Performed by: ORTHOPAEDIC SURGERY

## 2020-09-10 PROCEDURE — 3600000012 HC SURGERY LEVEL 2 ADDTL 15MIN: Performed by: ORTHOPAEDIC SURGERY

## 2020-09-10 PROCEDURE — 7100000000 HC PACU RECOVERY - FIRST 15 MIN: Performed by: ORTHOPAEDIC SURGERY

## 2020-09-10 PROCEDURE — 6370000000 HC RX 637 (ALT 250 FOR IP): Performed by: ANESTHESIOLOGY

## 2020-09-10 PROCEDURE — 7100000011 HC PHASE II RECOVERY - ADDTL 15 MIN: Performed by: ORTHOPAEDIC SURGERY

## 2020-09-10 PROCEDURE — 2500000003 HC RX 250 WO HCPCS: Performed by: ORTHOPAEDIC SURGERY

## 2020-09-10 PROCEDURE — 3600000002 HC SURGERY LEVEL 2 BASE: Performed by: ORTHOPAEDIC SURGERY

## 2020-09-10 PROCEDURE — 2580000003 HC RX 258: Performed by: ANESTHESIOLOGY

## 2020-09-10 PROCEDURE — 7100000010 HC PHASE II RECOVERY - FIRST 15 MIN: Performed by: ORTHOPAEDIC SURGERY

## 2020-09-10 RX ORDER — DIPHENHYDRAMINE HYDROCHLORIDE 50 MG/ML
12.5 INJECTION INTRAMUSCULAR; INTRAVENOUS
Status: DISCONTINUED | OUTPATIENT
Start: 2020-09-10 | End: 2020-09-10 | Stop reason: HOSPADM

## 2020-09-10 RX ORDER — OXYCODONE HYDROCHLORIDE AND ACETAMINOPHEN 5; 325 MG/1; MG/1
2 TABLET ORAL PRN
Status: COMPLETED | OUTPATIENT
Start: 2020-09-10 | End: 2020-09-10

## 2020-09-10 RX ORDER — SODIUM CHLORIDE, SODIUM LACTATE, POTASSIUM CHLORIDE, CALCIUM CHLORIDE 600; 310; 30; 20 MG/100ML; MG/100ML; MG/100ML; MG/100ML
INJECTION, SOLUTION INTRAVENOUS CONTINUOUS
Status: DISCONTINUED | OUTPATIENT
Start: 2020-09-10 | End: 2020-09-10 | Stop reason: HOSPADM

## 2020-09-10 RX ORDER — LABETALOL 20 MG/4 ML (5 MG/ML) INTRAVENOUS SYRINGE
5 EVERY 10 MIN PRN
Status: DISCONTINUED | OUTPATIENT
Start: 2020-09-10 | End: 2020-09-10 | Stop reason: HOSPADM

## 2020-09-10 RX ORDER — OXYCODONE HYDROCHLORIDE AND ACETAMINOPHEN 5; 325 MG/1; MG/1
TABLET ORAL
Status: DISCONTINUED
Start: 2020-09-10 | End: 2020-09-10 | Stop reason: HOSPADM

## 2020-09-10 RX ORDER — DEXAMETHASONE SODIUM PHOSPHATE 10 MG/ML
INJECTION, SOLUTION INTRAMUSCULAR; INTRAVENOUS PRN
Status: DISCONTINUED | OUTPATIENT
Start: 2020-09-10 | End: 2020-09-10 | Stop reason: SDUPTHER

## 2020-09-10 RX ORDER — 0.9 % SODIUM CHLORIDE 0.9 %
500 INTRAVENOUS SOLUTION INTRAVENOUS
Status: DISCONTINUED | OUTPATIENT
Start: 2020-09-10 | End: 2020-09-10 | Stop reason: HOSPADM

## 2020-09-10 RX ORDER — LIDOCAINE HYDROCHLORIDE AND EPINEPHRINE 10; 10 MG/ML; UG/ML
INJECTION, SOLUTION INFILTRATION; PERINEURAL
Status: DISCONTINUED
Start: 2020-09-10 | End: 2020-09-10 | Stop reason: WASHOUT

## 2020-09-10 RX ORDER — SODIUM CHLORIDE 0.9 % (FLUSH) 0.9 %
10 SYRINGE (ML) INJECTION PRN
Status: DISCONTINUED | OUTPATIENT
Start: 2020-09-10 | End: 2020-09-10 | Stop reason: HOSPADM

## 2020-09-10 RX ORDER — CEFAZOLIN SODIUM 2 G/50ML
SOLUTION INTRAVENOUS PRN
Status: DISCONTINUED | OUTPATIENT
Start: 2020-09-10 | End: 2020-09-10 | Stop reason: SDUPTHER

## 2020-09-10 RX ORDER — LIDOCAINE HYDROCHLORIDE 10 MG/ML
INJECTION, SOLUTION EPIDURAL; INFILTRATION; INTRACAUDAL; PERINEURAL PRN
Status: DISCONTINUED | OUTPATIENT
Start: 2020-09-10 | End: 2020-09-10 | Stop reason: SDUPTHER

## 2020-09-10 RX ORDER — MEPERIDINE HYDROCHLORIDE 50 MG/ML
12.5 INJECTION INTRAMUSCULAR; INTRAVENOUS; SUBCUTANEOUS EVERY 5 MIN PRN
Status: DISCONTINUED | OUTPATIENT
Start: 2020-09-10 | End: 2020-09-10 | Stop reason: HOSPADM

## 2020-09-10 RX ORDER — ONDANSETRON 2 MG/ML
INJECTION INTRAMUSCULAR; INTRAVENOUS PRN
Status: DISCONTINUED | OUTPATIENT
Start: 2020-09-10 | End: 2020-09-10 | Stop reason: SDUPTHER

## 2020-09-10 RX ORDER — KETOROLAC TROMETHAMINE 30 MG/ML
INJECTION, SOLUTION INTRAMUSCULAR; INTRAVENOUS PRN
Status: DISCONTINUED | OUTPATIENT
Start: 2020-09-10 | End: 2020-09-10 | Stop reason: SDUPTHER

## 2020-09-10 RX ORDER — HYDROCODONE BITARTRATE AND ACETAMINOPHEN 5; 325 MG/1; MG/1
1 TABLET ORAL EVERY 4 HOURS PRN
Qty: 18 TABLET | Refills: 0 | Status: SHIPPED | OUTPATIENT
Start: 2020-09-10 | End: 2020-09-13

## 2020-09-10 RX ORDER — PROMETHAZINE HYDROCHLORIDE 25 MG/ML
12.5 INJECTION, SOLUTION INTRAMUSCULAR; INTRAVENOUS
Status: DISCONTINUED | OUTPATIENT
Start: 2020-09-10 | End: 2020-09-10 | Stop reason: HOSPADM

## 2020-09-10 RX ORDER — SODIUM CHLORIDE 0.9 % (FLUSH) 0.9 %
10 SYRINGE (ML) INJECTION EVERY 12 HOURS SCHEDULED
Status: DISCONTINUED | OUTPATIENT
Start: 2020-09-10 | End: 2020-09-10 | Stop reason: HOSPADM

## 2020-09-10 RX ORDER — PROPOFOL 10 MG/ML
INJECTION, EMULSION INTRAVENOUS PRN
Status: DISCONTINUED | OUTPATIENT
Start: 2020-09-10 | End: 2020-09-10 | Stop reason: SDUPTHER

## 2020-09-10 RX ORDER — SODIUM CHLORIDE 9 MG/ML
INJECTION, SOLUTION INTRAVENOUS CONTINUOUS
Status: DISCONTINUED | OUTPATIENT
Start: 2020-09-10 | End: 2020-09-10 | Stop reason: HOSPADM

## 2020-09-10 RX ORDER — LIDOCAINE HYDROCHLORIDE 10 MG/ML
INJECTION, SOLUTION INFILTRATION; PERINEURAL
Status: COMPLETED
Start: 2020-09-10 | End: 2020-09-10

## 2020-09-10 RX ORDER — LIDOCAINE HYDROCHLORIDE 10 MG/ML
INJECTION, SOLUTION EPIDURAL; INFILTRATION; INTRACAUDAL; PERINEURAL PRN
Status: DISCONTINUED | OUTPATIENT
Start: 2020-09-10 | End: 2020-09-10 | Stop reason: ALTCHOICE

## 2020-09-10 RX ORDER — MIDAZOLAM HYDROCHLORIDE 1 MG/ML
INJECTION INTRAMUSCULAR; INTRAVENOUS PRN
Status: DISCONTINUED | OUTPATIENT
Start: 2020-09-10 | End: 2020-09-10 | Stop reason: SDUPTHER

## 2020-09-10 RX ORDER — ONDANSETRON 2 MG/ML
4 INJECTION INTRAMUSCULAR; INTRAVENOUS
Status: DISCONTINUED | OUTPATIENT
Start: 2020-09-10 | End: 2020-09-10 | Stop reason: HOSPADM

## 2020-09-10 RX ORDER — MORPHINE SULFATE 2 MG/ML
2 INJECTION, SOLUTION INTRAMUSCULAR; INTRAVENOUS EVERY 5 MIN PRN
Status: DISCONTINUED | OUTPATIENT
Start: 2020-09-10 | End: 2020-09-10 | Stop reason: HOSPADM

## 2020-09-10 RX ORDER — OXYCODONE HYDROCHLORIDE AND ACETAMINOPHEN 5; 325 MG/1; MG/1
1 TABLET ORAL PRN
Status: COMPLETED | OUTPATIENT
Start: 2020-09-10 | End: 2020-09-10

## 2020-09-10 RX ADMIN — KETOROLAC TROMETHAMINE 30 MG: 30 INJECTION, SOLUTION INTRAMUSCULAR; INTRAVENOUS at 07:39

## 2020-09-10 RX ADMIN — MIDAZOLAM HYDROCHLORIDE 2 MG: 1 INJECTION, SOLUTION INTRAMUSCULAR; INTRAVENOUS at 07:24

## 2020-09-10 RX ADMIN — LIDOCAINE HYDROCHLORIDE 50 MG: 10 INJECTION, SOLUTION EPIDURAL; INFILTRATION; INTRACAUDAL; PERINEURAL at 07:27

## 2020-09-10 RX ADMIN — OXYCODONE HYDROCHLORIDE AND ACETAMINOPHEN 1 TABLET: 5; 325 TABLET ORAL at 08:15

## 2020-09-10 RX ADMIN — SODIUM CHLORIDE, POTASSIUM CHLORIDE, SODIUM LACTATE AND CALCIUM CHLORIDE: 600; 310; 30; 20 INJECTION, SOLUTION INTRAVENOUS at 07:24

## 2020-09-10 RX ADMIN — ONDANSETRON 4 MG: 2 INJECTION, SOLUTION INTRAMUSCULAR; INTRAVENOUS at 07:36

## 2020-09-10 RX ADMIN — DEXAMETHASONE SODIUM PHOSPHATE 10 MG: 10 INJECTION, SOLUTION INTRAMUSCULAR; INTRAVENOUS at 07:35

## 2020-09-10 RX ADMIN — PROPOFOL 200 MG: 10 INJECTION, EMULSION INTRAVENOUS at 07:27

## 2020-09-10 RX ADMIN — CEFAZOLIN SODIUM 2 G: 2 SOLUTION INTRAVENOUS at 07:30

## 2020-09-10 ASSESSMENT — PULMONARY FUNCTION TESTS
PIF_VALUE: 15
PIF_VALUE: 2
PIF_VALUE: 17
PIF_VALUE: 15
PIF_VALUE: 2
PIF_VALUE: 16
PIF_VALUE: 17
PIF_VALUE: 15
PIF_VALUE: 1
PIF_VALUE: 6
PIF_VALUE: 0
PIF_VALUE: 15
PIF_VALUE: 2
PIF_VALUE: 15
PIF_VALUE: 8
PIF_VALUE: 15
PIF_VALUE: 0
PIF_VALUE: 15
PIF_VALUE: 15
PIF_VALUE: 17
PIF_VALUE: 16
PIF_VALUE: 2
PIF_VALUE: 2
PIF_VALUE: 17

## 2020-09-10 ASSESSMENT — LIFESTYLE VARIABLES: SMOKING_STATUS: 1

## 2020-09-10 ASSESSMENT — PAIN DESCRIPTION - PROGRESSION: CLINICAL_PROGRESSION: GRADUALLY IMPROVING

## 2020-09-10 ASSESSMENT — PAIN - FUNCTIONAL ASSESSMENT: PAIN_FUNCTIONAL_ASSESSMENT: 0-10

## 2020-09-10 ASSESSMENT — PAIN SCALES - GENERAL
PAINLEVEL_OUTOF10: 0
PAINLEVEL_OUTOF10: 8
PAINLEVEL_OUTOF10: 5

## 2020-09-10 ASSESSMENT — PAIN DESCRIPTION - LOCATION: LOCATION: HAND;INCISION

## 2020-09-10 ASSESSMENT — PAIN DESCRIPTION - PAIN TYPE: TYPE: SURGICAL PAIN

## 2020-09-10 ASSESSMENT — PAIN DESCRIPTION - ORIENTATION: ORIENTATION: RIGHT

## 2020-09-10 NOTE — H&P
History and Physical        CHIEF COMPLAINT:  Right thumb pain    HISTORY OF PRESENT ILLNESS:      The patient is a 52 y.o. female  who presents with     HPI      Patient presents with a left trigger thumb        Patient with painful flexion inability to fully flex her left thumb     Past Medical History        Past Medical History:   Diagnosis Date    Arthritis      COPD (chronic obstructive pulmonary disease) ([address]      Former smoker      MI, old          Past Surgical History         Past Surgical History:   Procedure Laterality Date    HYSTERECTOMY        TYMPANOSTOMY TUBE PLACEMENT         About 3 years ago per patient- b/l        Family History         Family History   Problem Relation Age of Onset    High Blood Pressure Father      Cancer Paternal Uncle      Cancer Paternal Grandmother          Social History            Occupational History    Not on file   Tobacco Use    Smoking status: Current Every Day Smoker       Packs/day: 0.25       Types: Cigarettes    Smokeless tobacco: Never Used    Tobacco comment: pt smokes about 1 cigg daily   Substance and Sexual Activity    Alcohol use: No    Drug use: No    Sexual activity: Not on file         Review of Systems            Physical Exam  Vitals signs and nursing note reviewed. Constitutional:       Appearance: She is well-developed. HENT:      Head: Normocephalic and atraumatic. Nose: Nose normal.   Eyes:      Conjunctiva/sclera: Conjunctivae normal.   Neck:      Musculoskeletal: Normal range of motion and neck supple. Pulmonary:      Effort: Pulmonary effort is normal. No respiratory distress. Musculoskeletal:      Comments: Normal gait     Skin:     General: Skin is warm and dry. Neurological:      Mental Status: She is alert and oriented to person, place, and time. Sensory: No sensory deficit. Psychiatric:         Behavior: Behavior normal.         Thought Content:  Thought content normal.         MRI right hand March reviewed patient with a partial ulnar collateral ligament injury     Physical exam patient with severe guarding of thumb     Diagnostic injection 2ml lidocaine  provided patient continue to have restricted range of motion but definitely had positive triggering with her limited flexion and extension  Assessment:            1. Trigger finger of right thumb         Plan:      Okay to schedule right trigger thumb release     No orders of the defined types were placed in this encounter. Past Medical History:    Past Medical History:   Diagnosis Date    Arthritis     [facility] obstructive [facility] disease) [facility]     Former smoker     MI, old        Past Surgical History:    Past Surgical History:   Procedure Laterality Date    HYSTERECTOMY      TYMPANOSTOMY TUBE PLACEMENT      About 3 years ago per patient- b/l       Medications Prior to Admission:   Prior to Admission medications    Medication Sig Start Date End Date Taking? Authorizing Provider   escitalopram (LEXAPRO) 10 MG tablet Take 1 tablet by mouth daily 8/13/20  Yes BRET Eduardo CNP   ibuprofen (ADVIL;MOTRIN) 800 MG tablet Take 1 tablet by mouth every 8 hours as needed for Pain 2/13/20  Yes BRET Eduardo CNP   glycopyrrolate-formoterol (BEVESPI) 9-4.8 MCG/ACT AERO Inhale 2 puffs into the lungs 2 times daily   Yes Historical Provider, MD   nicotine (NICODERM CQ) 21 MG/24HR Place 1 patch onto the skin every 24 hours 8/13/20 9/27/20  BRET Eduardo CNP   VENTOLIN  (90 Base) MCG/ACT inhaler Inhale 2 puffs into the lungs every 4 hours as needed for Wheezing or Shortness of Breath 5/21/20   BRET Eduardo CNP    Scheduled Meds:   sodium chloride flush  10 mL Intravenous 2 times per day    lidocaine        lidocaine-EPINEPHrine         Continuous Infusions:   sodium chloride      lactated ringers       PRN Meds:.sodium chloride flush      Allergies:  Patient has no known allergies.     Social History: Social History     Occupational History    Not on file   Tobacco Use    Smoking status: Current Every Day Smoker     Packs/day: 0.25     Types: Cigarettes     Start date: 8/31/1986    Smokeless tobacco: Never Used    Tobacco comment: pt smokes about 1 cigg daily   Substance and Sexual Activity    Alcohol use: No    Drug use: No    Sexual activity: Yes     Partners: Male        Family History:  Family History   Problem Relation Age of Onset    High Blood Pressure Father     Cancer Paternal Uncle     Cancer Paternal Grandmother          REVIEW OF SYSTEMS:  Gen: Negative for nausea, vomiting, diarrhea, fever, chills, night sweats  Heart: Negative for HTN, palpitations, chest pain  Lungs: Negative for wheezes, asthma or SOB  GI: Negative for nausea, vomiting  Endo: Negative for diabetes  Heme: Negative for DVT       PHYSICAL EXAM:  Patient Vitals for the past 24 hrs:   BP Temp Temp src Pulse Resp SpO2 Height Weight   09/10/20 0650 -- -- -- 71 -- -- -- --   09/10/20 0631 123/73 97.6 °F (36.4 °C) Temporal 71 15 95 % 5' 3\" (1.6 m) 125 lb 3.2 oz (56.8 kg)     Gen: alert and oriented  Head: normorcephalic, atraumatic  Neck: supple  Heart: RRR  Lungs: No audible wheezes  Abdomen: soft  Pelvis: stable  Extremity see above    DATA:  CBC:   Lab Results   Component Value Date    WBC 14.6 10/21/2019    HGB 12.3 10/21/2019     10/21/2019     BMP:    Lab Results   Component Value Date     10/21/2019    K 4.0 10/21/2019     10/21/2019    CO2 25 10/21/2019    BUN 8 10/21/2019    CREATININE 0.90 10/21/2019    CALCIUM 9.0 10/21/2019    GLUCOSE 115 10/21/2019     PT/INR:    Lab Results   Component Value Date    PROTIME 10.6 04/24/2017    INR 1.0 04/24/2017     Troponin:  No results found for: [address] [place] [address]    Radiology: na    ASSESSMENT: Active Problems:    * No active hospital problems. *  Resolved Problems:    * No resolved hospital problems.  *       PLAN:  1)  Right trigger thumb release        Meg Harmon

## 2020-09-10 NOTE — ANESTHESIA POSTPROCEDURE EVALUATION
Department of Anesthesiology  Postprocedure Note    Patient: Steve Vincent  MRN: [id number]  YOB: 1970  Date of evaluation: 9/10/2020  Time:  7:59 AM     Procedure Summary     Date:  09/10/20 Room / Location:  [facility] OR 01 / [address] [place] [address]    Anesthesia Start:  5163 Anesthesia Stop:  3468    Procedure:  FINGER TRIGGER RELEASE - RIGHT THUMB (Right ) Diagnosis:  (TRIGGER THUMB LEFT)    Surgeon:  Alicia Villalpando MD Responsible Provider:  BRET Flores - CRNA    Anesthesia Type:  general ASA Status:  2          Anesthesia Type: general    Judith Phase I: Judith Score: 7    Judith Phase II:      Last vitals: Reviewed and per EMR flowsheets.        Anesthesia Post Evaluation    Patient location during evaluation: PACU  Patient participation: complete - patient participated  Level of consciousness: awake and alert  Airway patency: patent  Nausea & Vomiting: no nausea and no vomiting  Complications: no  Cardiovascular status: hemodynamically stable  Respiratory status: nasal cannula and spontaneous ventilation  Hydration status: euvolemic

## 2020-09-10 NOTE — ANESTHESIA PRE PROCEDURE
Department of Anesthesiology  Preprocedure Note       Name:  Fito Headley   Age:  52 y.o.  :  1970                                          MRN:  [id number]         Date:  9/10/2020      Surgeon: Neal Narayanan):  Jennifer Olson MD    Procedure: Procedure(s): FINGER TRIGGER RELEASE - RIGHT THUMB    Medications prior to admission:   Prior to Admission medications    Medication Sig Start Date End Date Taking?  Authorizing Provider   escitalopram (LEXAPRO) 10 MG tablet Take 1 tablet by mouth daily 20  Yes BRET Cool CNP   ibuprofen (ADVIL;MOTRIN) 800 MG tablet Take 1 tablet by mouth every 8 hours as needed for Pain 20  Yes BRET Cool CNP   glycopyrrolate-formoterol (BEVESPI) 9-4.8 MCG/ACT AERO Inhale 2 puffs into the lungs 2 times daily   Yes Historical Provider, MD   nicotine (NICODERM CQ) 21 MG/24HR Place 1 patch onto the skin every 24 hours 20  BRET Cool CNP   VENTOLIN  (90 Base) MCG/ACT inhaler Inhale 2 puffs into the lungs every 4 hours as needed for Wheezing or Shortness of Breath 20   BRET Cool CNP       Current medications:    Current Facility-Administered Medications   Medication Dose Route Frequency Provider Last Rate Last Dose    0.9 % sodium chloride infusion   Intravenous Continuous Sudheer Jesus MD        lactated ringers infusion   Intravenous Continuous Sudheer Jesus MD        sodium chloride flush 0.9 % injection 10 mL  10 mL Intravenous 2 times per day Sudheer Jesus MD        sodium chloride flush 0.9 % injection 10 mL  10 mL Intravenous PRN Sudheer Jesus MD        lidocaine 1 % injection             lidocaine-EPINEPHrine 1 percent-[id number] injection                Allergies:  No Known Allergies    Problem List:    Patient Active Problem List   Diagnosis Code    Chronic bilateral low back pain with bilateral sciatica M54.42, M54.41, G89.29    Chronic hip pain, right M25.551, G89.29    Chronic obstructive pulmonary disease ([facility] J44.9    History of myocardial infarction I25.2    Tympanostomy tube check Z45.89    DDD (degenerative disc disease), lumbar M51.36    Smokes and motivated to quit F17.200    Vertigo R42    Shortened ID interval R94.31    Vitamin D deficiency E55.9    Prediabetes R73.03    Leukocytosis D72.829    Cyst of left sphenoid sinus J34.1    Tricuspid regurgitation I07.1    Recurrent urinary tract infection N39.0    Abdominal bloating R14.0    Mood swings R45.86    Agitation R45.1    Hot flashes, menopausal N95.1    Pain of right thumb- Mild-moderate partial-thickness tear of the ulnar collateral ligament of the right thumb per MRI 3-17-20 M79.644    Skier's thumb, right, subsequent encounter F32.784T       Past Medical History:        Diagnosis Date    Arthritis     COPD (chronic obstructive pulmonary disease) ([facility]     Former smoker     MI, old        Past Surgical History:        Procedure Laterality Date    HYSTERECTOMY      TYMPANOSTOMY TUBE PLACEMENT      About 3 years ago per patient- b/l       Social History:    Social History     Tobacco Use    Smoking status: Current Every Day Smoker     Packs/day: 0.25     Types: Cigarettes     Start date: 8/31/1986    Smokeless tobacco: Never Used    Tobacco comment: pt smokes about 1 cigg daily   Substance Use Topics    Alcohol use:  No                                Ready to quit: Not Answered  Counseling given: Not Answered  Comment: pt smokes about 1 cigg daily      Vital Signs (Current):   Vitals:    09/10/20 0631 09/10/20 0650   BP: 123/73    Pulse: 71 71   Resp: 15    Temp: 97.6 °F (36.4 °C)    TempSrc: Temporal    SpO2: 95%    Weight: 125 lb 3.2 oz (56.8 kg)    Height: 5' 3\" (1.6 m)                                               BP Readings from Last 3 Encounters:   09/10/20 123/73   08/13/20 120/78   03/25/20 (!) 140/91       NPO Status: Time of last liquid consumption: 2100                        Time of last solid consumption: 2100 Date of last liquid consumption: 09/09/20                        Date of last solid food consumption: 09/09/20    BMI:   Wt Readings from Last 3 Encounters:   09/10/20 125 lb 3.2 oz (56.8 kg)   08/27/20 125 lb (56.7 kg)   08/18/20 125 lb (56.7 kg)     Body mass index is 22.18 kg/m². CBC:   Lab Results   Component Value Date    WBC 14.6 10/21/2019    RBC 4.24 10/21/2019    HGB 12.3 10/21/2019    HCT 37.3 10/21/2019    MCV 88.0 10/21/2019    RDW 14.8 10/21/2019     10/21/2019       CMP:   Lab Results   Component Value Date     10/21/2019    K 4.0 10/21/2019     10/21/2019    CO2 25 10/21/2019    BUN 8 10/21/2019    CREATININE 0.90 10/21/2019    GFRAA >60 10/21/2019    LABGLOM >60 10/21/2019    GLUCOSE 115 10/21/2019    PROT 7.3 10/21/2019    CALCIUM 9.0 10/21/2019    BILITOT 0.38 10/21/2019    ALKPHOS 75 10/21/2019    AST 10 10/21/2019    ALT 9 10/21/2019       POC Tests: No results for input(s): POCGLU, POCNA, POCK, POCCL, POCBUN, POCHEMO, POCHCT in the last 72 hours.     Coags:   Lab Results   Component Value Date    PROTIME 10.6 04/24/2017    INR 1.0 04/24/2017       HCG (If Applicable): No results found for: PREGTESTUR, PREGSERUM, HCG, HCGQUANT     ABGs: No results found for: PHART, PO2ART, LKD9WTO, WSW2WPC, BEART, I3PYHVIR     Type & Screen (If Applicable):  No results found for: LABABO, LABRH    Drug/Infectious Status (If Applicable):  No results found for: HIV, HEPCAB    COVID-19 Screening (If Applicable):   Lab Results   Component Value Date    COVID19 Not Detected 09/06/2020    COVID19 Not Detected 06/17/2020         Anesthesia Evaluation  Patient summary reviewed and Nursing notes reviewed  Airway: Mallampati: II  TM distance: >3 FB   Neck ROM: full  Mouth opening: > = 3 FB Dental:    (+) upper dentures and lower dentures      Pulmonary:normal exam    (+) COPD:  asthma: current smoker                          ROS comment: -SMOKES 1 PPD FOR 34 YEARS Cardiovascular:    (+) past MI:, CAD:, GUZMAN:,                ROS comment: -CAD S/P MI 2005 - NO STENTS, NO BLOOD THINNERS  -VERTIGO     Neuro/Psych:                ROS comment: -DDD  -BILATERAL SCIATICA GI/Hepatic/Renal: Neg GI/Hepatic/Renal ROS            Endo/Other:    (+) : arthritis:., .                  ROS comment: -NPO AFTER MIDNIGHT  -NKDA Abdominal:           Vascular: negative vascular ROS. Anesthesia Plan      general     ASA 2     (LMA)  Induction: intravenous. MIPS: Postoperative opioids intended and Prophylactic antiemetics administered. Anesthetic plan and risks discussed with patient. Plan discussed with CRNA.     Attending anesthesiologist reviewed and agrees with Pre Eval content              Cristina Waldron MD   9/10/2020

## 2020-09-10 NOTE — BRIEF OP NOTE
Brief Postoperative Note      Patient: Tyrese Solis  YOB: 1970  MRN: [id number]    Date of Procedure: 9/10/2020    Pre-Op Diagnosis: TRIGGER THUMB right    Post-Op Diagnosis: Same       Procedure(s):   FINGER TRIGGER RELEASE - RIGHT THUMB    Surgeon(s):  Lanette Erazo MD    Assistant:  * No surgical staff found *    Anesthesia: General    Estimated Blood Loss (mL): Minimal    Complications: None    Specimens:   * No specimens in log *    Implants:  * No implants in log *      Drains: * No LDAs found *    Findings: see dictation    Electronically signed by Lanette Erazo MD on 9/10/2020 at 7:45 AM

## 2020-09-22 NOTE — OP NOTE
[address] [facility]                                OPERATIVE REPORT    PATIENT NAME: Clark Davidson                   :        1970  MED REC NO:   [id number]                             ROOM:  ACCOUNT NO:   [de-identified]                           ADMIT DATE: 09/10/2020  PROVIDER:     Lianna Mason    DATE OF PROCEDURE:  09/10/2020    PROCEDURE PERFORMED AT:  [facility]. PREOPERATIVE DIAGNOSIS:  Right trigger thumb. POSTOPERATIVE DIAGNOSIS:  Right trigger thumb. PROCEDURE PERFORMED:  Right trigger thumb release. OPERATING SURGEON:  Lianna Mason MD    ASSISTANT:  None. ANESTHESIA:  General.    BLOOD LOSS:  Minimal.    COMPLICATIONS:  None. SPECIMEN:  None. IMPLANTS:  None. DRAINS:  None. FINDINGS:  A1 pulley transection under direct visualization. PROCEDURE IN DETAIL:  The patient was taken to the operating room,  placed under general anesthesia. Right upper extremity was prepped and  draped in the usual sterile fashion. A transverse incision was placed  at the base of the A1 pulley of the right thumb. After the arm was  elevated, pneumatic tourniquet was elevated. After a skin incision,  longitudinal blunt dissection was carried down to the A1 pulley. Soft  tissue was cleared off the A1 pulley to avoid further damage. The A1  pulley was then released. The tendons were retracted out of wound,  briefly inspected. Skin was reapproximated with 4-0 nylon interrupted  closure. Sterile dressing was applied. The patient was awakened from  anesthesia, taken to the recovery room in stable condition. COMPLICATIONS ARISING DURING THE OPERATION:  None noted.         MAYRA Saleh    D: 2020 14:50:59       T: 2020 23:24:55     [email or web address]  Job#: [id number]     Doc#: [id number]    CC:

## 2020-09-24 ENCOUNTER — OFFICE VISIT (OUTPATIENT)
Dept: ORTHOPEDIC SURGERY | Age: 50
End: 2020-09-24

## 2020-09-24 PROCEDURE — 99024 POSTOP FOLLOW-UP VISIT: CPT | Performed by: ORTHOPAEDIC SURGERY

## 2020-09-24 NOTE — PROGRESS NOTES
Behavior: Behavior normal.         Thought Content: Thought content normal.       Range of motion IP joint thumb still moderately limited    Incision well-healed sutures discontinued  Assessment:          1. Trigger finger of right thumb    Ongoing stiffness although patient's thumb had been largely nonmobile for approximately 8 months prior to trigger thumb release    Plan:     Follow-up 4 weeks    No orders of the defined types were placed in this encounter. Dennis Rowe MD    Please note that this chart was generated using voicerecognition Dragon dictation software. Although every effort was made to ensurethe accuracy of this automated transcription, some errors in transcription may haveoccurred.

## 2020-11-11 ENCOUNTER — TELEPHONE (OUTPATIENT)
Dept: FAMILY MEDICINE CLINIC | Age: 50
End: 2020-11-11

## 2020-11-11 NOTE — TELEPHONE ENCOUNTER
Please let her know to let us know the results. Unfortunately, if she does test positive, it is symptomatic management only at this point.

## 2020-11-11 NOTE — TELEPHONE ENCOUNTER
Patient called and states that she got her flu shot 10/23/2020 and a couple weeks later she has this cough, runny nose, rib pain on the left side in the back had did have a fever of 101. She called off work and she is going to get a covid test through [employer]. She just wasn't sure since we know her history if there is anything else she should do.

## 2020-11-12 ENCOUNTER — HOSPITAL ENCOUNTER (OUTPATIENT)
Age: 50
Discharge: HOME | End: 2020-11-12

## 2020-11-12 PROCEDURE — U0003 INFECTIOUS AGENT DETECTION BY NUCLEIC ACID (DNA OR RNA); SEVERE ACUTE RESPIRATORY SYNDROME CORONAVIRUS 2 (SARS-COV-2) (CORONAVIRUS DISEASE [COVID-19]), AMPLIFIED PROBE TECHNIQUE, MAKING USE OF HIGH THROUGHPUT TECHNOLOGIES AS DESCRIBED BY CMS-2020-01-R: HCPCS

## 2020-11-15 LAB — SARS-COV-2, NAA: NOT DETECTED

## 2020-11-17 ENCOUNTER — NURSE TRIAGE (OUTPATIENT)
Dept: OTHER | Facility: CLINIC | Age: 50
End: 2020-11-17

## 2020-11-17 NOTE — TELEPHONE ENCOUNTER
Patient called pre-service center [facility] with red flag complaint. Brief description of triage: Called for return to work note, post Covid testing    Triage indicates for patient to no indication of illness    Care advice provided, patient verbalizes understanding; denies any other questions or concerns; instructed to call back for any new or worsening symptoms. Writer provided warm transfer to [facility] at [facility] for appointment scheduling. Attention Provider: Thank you for allowing me to participate in the care of your patient. The patient was connected to triage in response to information provided to the [facility]. Please do not respond through this encounter as the response is not directed to a shared pool. Additional Information   Commented on: All Negative - See PCP within 2 Weeks     Case transferred to [facility] for completion. Answer Assessment - Initial Assessment Questions  1. ONSET: \"When did the cough begin? \"       Couple years    2. SEVERITY: \"How bad is the cough today? \"       Not bad today    3. RESPIRATORY DISTRESS: \"Describe your breathing. \"       In breathing very well today    4. FEVER: \"Do you have a fever? \" If so, ask: \"What is your temperature, how was it measured, and when did it start? \"      No fever    5. SPUTUM: \"Describe the color of your sputum\" (e.g., clear, white, yellow, green), \"Has there been any change recently? \"      Clear    6. HEMOPTYSIS: \"Are you coughing up any blood? \" If so ask: \"How much, flecks, streaks, tablespoons, etc.? \"      No    7. CARDIAC HISTORY: \"Do you have any history of heart disease? \" (e.g., heart attack, congestive heart failure)       No    8. LUNG HISTORY: \"Do you have any history of lung disease? \"  (e.g., pulmonary embolus, asthma, emphysema/COPD)      COPD for 5-6 years    9. OTHER SYMPTOMS: \"Do you have any other symptoms? (e.g., runny nose, wheezing, chest pain)      none  10.  PREGNANCY: \"Is there any chance you are

## 2020-11-17 NOTE — TELEPHONE ENCOUNTER
Patient is employed at [address] [place] [address] and had called in on 11/11 r/t covid symptoms. Patient was referred to Occupational health MD office for further guidance and testing. Patient has tested negative for covid and per Occupational Health MD office needs a note of medical clearance from the PCP in order to return to work. This RN spoke to [facility] who advised the patient schedule either a VV or in person visit for evaluation and obtaining the note of medical clearance. Patient was contacted by this RN to advise of the plan and was agreeable. This RN placed patient info in [facility] teams scheduling chat and requested a call back to schedule in person office visit (per patient request).

## 2020-11-23 ENCOUNTER — OFFICE VISIT (OUTPATIENT)
Dept: FAMILY MEDICINE CLINIC | Age: 50
End: 2020-11-23
Payer: COMMERCIAL

## 2020-11-23 VITALS
SYSTOLIC BLOOD PRESSURE: 122 MMHG | BODY MASS INDEX: 22.68 KG/M2 | HEART RATE: 85 BPM | DIASTOLIC BLOOD PRESSURE: 88 MMHG | WEIGHT: 128 LBS | HEIGHT: 63 IN | TEMPERATURE: 98.2 F | OXYGEN SATURATION: 97 %

## 2020-11-23 PROBLEM — R73.9 HYPERGLYCEMIA: Status: ACTIVE | Noted: 2019-10-24

## 2020-11-23 PROCEDURE — 99214 OFFICE O/P EST MOD 30 MIN: CPT | Performed by: NURSE PRACTITIONER

## 2020-11-23 RX ORDER — ZOSTER VACCINE RECOMBINANT, ADJUVANTED 50 MCG/0.5
0.5 KIT INTRAMUSCULAR SEE ADMIN INSTRUCTIONS
Qty: 0.5 ML | Refills: 1 | Status: SHIPPED | OUTPATIENT
Start: 2020-11-23 | End: 2021-05-22

## 2020-11-23 ASSESSMENT — ENCOUNTER SYMPTOMS
DIARRHEA: 0
ABDOMINAL DISTENTION: 0
VOMITING: 0
ABDOMINAL PAIN: 0
COUGH: 1
SHORTNESS OF BREATH: 0
SORE THROAT: 0
CONSTIPATION: 0
RHINORRHEA: 0
NAUSEA: 0
BACK PAIN: 0
CHEST TIGHTNESS: 0

## 2020-11-23 ASSESSMENT — PATIENT HEALTH QUESTIONNAIRE - PHQ9
SUM OF ALL RESPONSES TO PHQ QUESTIONS 1-9: 0
2. FEELING DOWN, DEPRESSED OR HOPELESS: 0
SUM OF ALL RESPONSES TO PHQ9 QUESTIONS 1 & 2: 0
1. LITTLE INTEREST OR PLEASURE IN DOING THINGS: 0
SUM OF ALL RESPONSES TO PHQ QUESTIONS 1-9: 0
SUM OF ALL RESPONSES TO PHQ QUESTIONS 1-9: 0

## 2020-11-23 NOTE — LETTER
Rinda Schlossman, APRN-CNP  [address] [facility]  [id number]  [address] [address]  Dept: [phone]  Dept Fax: [phone]    November 23, 2020    To Whom it May Concern,     Mack Tsai was seen and treated in our office 11/23/2020. She may return to work on 11/25/2020 without restrictions. Any questions, please call the office at the above number.     Sincerely,     JOHN McnairCNP

## 2020-11-23 NOTE — PROGRESS NOTES
Calton Ahumada, APRN-CNP  [address] [facility]  [id number]  [address] [address]  Dept: [phone]  Dept Fax: [phone]     Patient ID: Glory Emery is a 48 y.o. female. HPI    Pt here today to transfer care from my partner, Lakesha Pavon, APRN-CNP and to  for f/u on chronic medical problems; COPD, hyperglycemia, vitamin d deficiency, smoker, go over labs and/or diagnostic studies, and medication refills. Pt denies any fever or chills. Pt today denies any HA, chest pain, or SOB. Pt denies any N/V/D/C or abdominal pain. Pt with arthralgias on and off, but stable on meds. Pt states mood is stable on meds. She did test negative for COVID-19 on 11/12 and has completed her 10 day quarantine and needs a return to work note. Otherwise She is doing well on current tx and no other concerns today. The patient's past medical, surgical, social, and family history as well as his current medications and allergies were reviewed as documented in today's encounter by HANK Green. Current Outpatient Medications on File Prior to Visit   Medication Sig Dispense Refill    escitalopram (LEXAPRO) 10 MG tablet Take 1 tablet by mouth daily 90 tablet 1    nicotine (NICODERM CQ) 21 MG/24HR Place 1 patch onto the skin every 24 hours 45 patch 0    VENTOLIN  (90 Base) MCG/ACT inhaler Inhale 2 puffs into the lungs every 4 hours as needed for Wheezing or Shortness of Breath 1 Inhaler 1    ibuprofen (ADVIL;MOTRIN) 800 MG tablet Take 1 tablet by mouth every 8 hours as needed for Pain 90 tablet 0    glycopyrrolate-formoterol (BEVESPI) 9-4.8 MCG/ACT AERO Inhale 2 puffs into the lungs 2 times daily       No current facility-administered medications on file prior to visit. Subjective:     Review of Systems   Constitutional: Negative for activity change, fatigue and fever.    HENT: Negative for congestion, ear pain, rhinorrhea and sore throat. Respiratory: Positive for cough (Occasional but stable). Negative for chest tightness and shortness of breath. Cardiovascular: Negative for chest pain and palpitations. Gastrointestinal: Negative for abdominal distention, abdominal pain, constipation, diarrhea, nausea and vomiting. Endocrine: Negative for polydipsia, polyphagia and polyuria. Genitourinary: Negative for difficulty urinating and dysuria. Musculoskeletal: Positive for arthralgias (stable) and myalgias (stable). Negative for back pain. Skin: Negative for rash. Neurological: Negative for dizziness, weakness, light-headedness and headaches. Hematological: Negative for adenopathy. Psychiatric/Behavioral: Positive for dysphoric mood (stable). Negative for agitation and behavioral problems. The patient is not nervous/anxious. Objective:     Physical Exam  Vitals signs reviewed. Constitutional:       General: She is not in acute distress. Appearance: Normal appearance. She is well-developed. HENT:      Head: Normocephalic and atraumatic. Right Ear: Hearing and external ear normal.      Left Ear: Hearing and external ear normal.      Nose: Nose normal. No congestion. Right Sinus: No maxillary sinus tenderness or frontal sinus tenderness. Left Sinus: No maxillary sinus tenderness or frontal sinus tenderness. Mouth/Throat:      Lips: Pink. No lesions. Mouth: Mucous membranes are moist. No oral lesions. Tongue: No lesions. Pharynx: Oropharynx is clear. No oropharyngeal exudate or posterior oropharyngeal erythema. Eyes:      Extraocular Movements: Extraocular movements intact. Conjunctiva/sclera: Conjunctivae normal.      Pupils: Pupils are equal, round, and reactive to light. Neck:      Musculoskeletal: Full passive range of motion without pain and normal range of motion. Thyroid: No thyroid mass. Cardiovascular:      Rate and Rhythm: Normal rate and regular rhythm. Pulses: Normal pulses. Heart sounds: Normal heart sounds. No murmur. Pulmonary:      Effort: Pulmonary effort is normal. No respiratory distress. Breath sounds: Normal breath sounds and air entry. No wheezing, rhonchi or rales. Abdominal:      General: Bowel sounds are normal. There is no distension. Palpations: Abdomen is soft. Tenderness: There is no abdominal tenderness. Musculoskeletal: Normal range of motion. Right lower leg: No edema. Left lower leg: No edema. Lymphadenopathy:      Cervical: No cervical adenopathy. Skin:     General: Skin is warm and dry. Capillary Refill: Capillary refill takes less than 2 seconds. Findings: No rash. Neurological:      General: No focal deficit present. Mental Status: She is alert and oriented to person, place, and time. Deep Tendon Reflexes: Reflexes normal.   Psychiatric:         Attention and Perception: Attention and perception normal.         Mood and Affect: Mood and affect normal.         Speech: Speech normal.         Behavior: Behavior normal. Behavior is cooperative. Cognition and Memory: Memory normal.     She did not get labs prior to this visit    Assessment:      Diagnosis Orders   1. Chronic obstructive pulmonary disease, unspecified COPD type ([facility] [address]     2. Hyperglycemia  CBC   3. Vitamin D deficiency     4. Smokes and motivated to quit     5. Need for vaccination  zoster recombinant adjuvanted vaccine [facility]) 50 MCG/0.5ML SUSR injection   6. Colon cancer screening  Cologuard (For External Results Only)   7. Encounter for screening mammogram for malignant neoplasm of breast  CARMITA DIGITAL SCREEN W OR WO CAD BILATERAL   8. Return to work evaluation       Plan:     1. Chronic obstructive pulmonary disease, unspecified COPD type ([facility] [address]  - Stable: Medication re-filled as needed, con't medications as prescribed, con't current tx plan  - Continue with Ventolin as previously prescribed.    - Continue with Boris Williamson as previously prescribed. - Will cont to follow with Dr. Annette Malin as instructed  - Smoking cessation     2. Hyperglycemia  - Stable: Medication re-filled as needed, con't medications as prescribed, con't current tx plan  - Advised to decrease, fats, carbohydrates, and engage in a healthier diet overall  - Encouraged 40 minutes of aerobic exercise 3-4 times/week. - CBC; Future    3. Vitamin D deficiency  - Stable: Medication re-filled as needed, con't medications as prescribed, con't current tx plan  - Continue Vitamin D supplements as ordered, will re-check Vitamin D level  - Please increase foods with Vitamin D, which include cheese, eggs, cereals, yogurt, milk, tofu, any type of beef, salmon or tuna,  spinach, and mushroom. 4. Smokes and motivated to quit  - Discussed smoking cessation, pt is aware of the risk of heart attack, heart disease, lung disease, and cancers with the use of tobacco.  - Declines assistance with cessation at this time, instructed to cut down on tobacco use with intention of quitting. 5. Need for vaccination  -Printed prescription provided in the office to obtain at pharmacy, discussed need for booster in 2-6 months  - zoster recombinant adjuvanted vaccine [facility]) 50 MCG/0.5ML SUSR injection; Inject 0.5 mLs into the muscle See Admin Instructions 1 dose now and repeat in 2-6 months  Dispense: 0.5 mL; Refill: 1    6. Colon cancer screening  - Colon cancer screening discussed with patient, pt has agreed to testing and testing has been ordered   - Patient declined traditional colonoscopy at this time, is agreeable to Cologuard screening- handout provided to patient. - Cologuard (For External Results Only); Future    7.  Encounter for screening mammogram for malignant neoplasm of breast  - Annual breast CA screening highly recommended per guidelines for women beginning at age 36.  - Encouraged mammogram testing as soon as possible with yearly follow ups  - CARMITA ordered and will call with results  - CARMITA DIGITAL SCREEN W OR WO CAD BILATERAL    8. Return to work evaluation  - COVID negative  - Symptoms resolved  - Return to work note provided    - Rest of systems unchanged, continue current treatments. - Medications, labs, diagnostic studies, consultations and follow-up as documented in this encounter.  Rest of systems unchanged, continue current treatments    Calton Ahumada, APRN-CNP

## 2020-12-15 ENCOUNTER — NURSE TRIAGE (OUTPATIENT)
Dept: OTHER | Facility: CLINIC | Age: 50
End: 2020-12-15

## 2020-12-16 ENCOUNTER — VIRTUAL VISIT (OUTPATIENT)
Dept: FAMILY MEDICINE CLINIC | Age: 50
End: 2020-12-16
Payer: COMMERCIAL

## 2020-12-16 PROCEDURE — 99213 OFFICE O/P EST LOW 20 MIN: CPT | Performed by: NURSE PRACTITIONER

## 2020-12-16 RX ORDER — IBUPROFEN 800 MG/1
800 TABLET ORAL EVERY 8 HOURS PRN
Qty: 90 TABLET | Refills: 1 | Status: SHIPPED | OUTPATIENT
Start: 2020-12-16

## 2020-12-16 NOTE — TELEPHONE ENCOUNTER
Reason for Disposition   Headache    Answer Assessment - Initial Assessment Questions  1. LOCATION: \"Where does it hurt? \"       Front. Lay down, back of head and spine    2. ONSET: \"When did the headache start? \" (Minutes, hours or days)       Began earlier tonight. 3. PATTERN: \"Does the pain come and go, or has it been constant since it started? \"      Constant pain. Couldn't sleep    4. SEVERITY: \"How bad is the pain? \" and \"What does it keep you from doing? \"  (e.g., Scale 1-10; mild, moderate, or severe)   Rates pain 4/10.    5. RECURRENT SYMPTOM: \"Have you ever had headaches before? \" If so, ask: \"When was the last time? \" and \"What happened that time? \"       Sinus drainage, sore throat    6. CAUSE: \"What do you think is causing the headache? \"      Feels different than her normal migraines. States more of a headache. 7. MIGRAINE: \"Have you been diagnosed with migraine headaches? \" If so, ask: \"Is this headache similar? \"       Denies migraine -     8. HEAD INJURY: \"Has there been any recent injury to the head? \"       Denies. 9. OTHER SYMPTOMS: \"Do you have any other symptoms? \" (fever, stiff neck, eye pain, sore throat, cold symptoms)      Sore throat. 10. PREGNANCY: \"Is there any chance you are pregnant? \" \"When was your last menstrual period? \"        N/A    Protocols used: [facility]    Home Care. She is taking Ibuprofen for HA symptoms. 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 Call back with worsening symptoms. Please do not respond to the triage nurse through this encounter.   Any subsequent communication should be directly with the patient

## 2020-12-16 NOTE — TELEPHONE ENCOUNTER
Can you call her this morning and see how she is doing.  Offer a virtual visit if she would like one

## 2020-12-16 NOTE — PROGRESS NOTES
Arcenio Aparicio, APRN-[address]  [id number]  [address], [address]  [address] [address]  Dept: [phone]  Dept Fax: [phone]    Patient ID: Danny Thornton is a 48 y.o. female. HPI     Subjective:     Danny Thornton is a 48 y.o. female who presents today via virtual visit complaining of congestion, post nasal drip, myalgias, headache and generalized sinus pain for 2 days. She denies a history of chest pain and shortness of breath. She does a history of asthma. Patient does smoke cigarettes. Pt denies any fever or chills. Pt today denies any HA, chest pain, or SOB. Pt denies any N/V/D/C or abdominal pain. Otherwise pt doing well on current tx and no other concerns today. The patient's past medical, surgical, social, and family history as well as his current medications and allergies were reviewed as documented in today's encounter by HANK Frye. Objective:     Physical Exam  Vitals reviewed: Vital signs unavailable, as this is a virtual visit. Constitutional:       General: She is not in acute distress. Appearance: Normal appearance. She is ill-appearing. She is not toxic-appearing. Pulmonary:      Effort: No tachypnea or accessory muscle usage. Comments: Patient able to talk in full sentences without difficulty   Neurological:      General: No focal deficit present. Mental Status: She is alert and oriented to person, place, and time. Psychiatric:         Mood and Affect: Mood normal.         Speech: Speech normal.         Behavior: Behavior normal. Behavior is cooperative. Assessment:      Diagnosis Orders   1. Congested nose  COVID-19 Ambulatory   2. Pain of right thumb  ibuprofen (ADVIL;MOTRIN) 800 MG tablet   3. Intractable headache, unspecified chronicity pattern, unspecified headache type  COVID-19 Ambulatory   4. Myalgia  COVID-19 Ambulatory      Plan:     1. Congested nose  2.  Intractable headache, unspecified chronicity pattern, unspecified headache type  3. Myalgia  - Given her above noted  symptoms, I am going to test for Covid. - She was instructed to go to the [facility] on [facility] for the test and I will follow up on the results. - I did state they are taking 5-7 days to get the results  - She was instructed to self quarantine for 14 days from the onset of symptoms or exposure, wear a mask, social distance and good hand washing.   - She can take Tylenol for the symptoms, and instructed to follow up if any change or worsening in symptoms.  - COVID-19 Ambulatory; Future    4. Pain of right thumb  - Will refill ibuprofen  - ibuprofen (ADVIL;MOTRIN) 800 MG tablet; Take 1 tablet by mouth every 8 hours as needed for Pain  Dispense: 90 tablet; Refill: 1    - Rest of systems unchanged, continue current treatments. - Medications, labs, diagnostic studies, consultations and follow-up as documented in this encounter. Rest of systems unchanged, continue current treatments    Fawad Harden is a 48 y.o. female being evaluated by a Virtual Visit (video visit) encounter to address concerns as mentioned above. A caregiver was present when appropriate. Due to this being a TeleHealth encounter (During [facility]- public health emergency), evaluation of the following organ systems was limited: Vitals/Constitutional/EENT/Resp/CV/GI//MS/Neuro/Skin/Heme-Lymph-Imm. Pursuant to the emergency declaration under the [facility], [address] [place] [address] authority and the [employer] and [employer] Act, this Virtual Visit was conducted with patient's (and/or legal guardian's) consent, to reduce the patient's risk of exposure to COVID-19 and provide necessary medical care.   The patient (and/or legal guardian) has also been advised to contact this office for worsening conditions or problems, and seek emergency medical treatment and/or call [phone] if deemed necessary. Patient identification was verified at the start of the visit: Yes    Total time spent for this encounter: Not billed by time    Services were provided through a video synchronous discussion virtually to substitute for in-person clinic visit. Patient and provider were located at their individual homes. --BRET Alexis - NP on 12/16/2020 at 10:07 AM    An electronic signature was used to authenticate this note.       BRET Elizondo-CNP

## 2020-12-18 ENCOUNTER — TELEPHONE (OUTPATIENT)
Dept: PAIN MANAGEMENT | Age: 50
End: 2020-12-18

## 2020-12-18 ENCOUNTER — TELEPHONE (OUTPATIENT)
Dept: FAMILY MEDICINE CLINIC | Age: 50
End: 2020-12-18

## 2020-12-18 NOTE — TELEPHONE ENCOUNTER
Patient called, said she had a COVID test done and it came back negative. Her employer is requiring a return to work note and she needs to return on Monday. Patient is wondering if she needs to set up a virtual appointment with you in order to receive the work note.

## 2020-12-18 NOTE — LETTER
[address] [id number]  [address]  [address] [address]  Phone: [phone]  Fax: [phone]    BRET Lao NP        December 18, 2020     Patient: Cookie Torres   YOB: 1970   Date of Visit: 12/18/2020       To Whom It May Concern: It is my medical opinion that Cookie Torres may return to work on Monday 12/21/2020. If you have any questions or concerns, please don't hesitate to call.     Sincerely,          BRET Lao NP

## 2020-12-18 NOTE — LETTER
[address] [id number]  [address]  [address] [address]  Phone: [phone]  Fax: [phone]    BRET Roman NP        December 18, 2020     Patient: Masood Rudolph   YOB: 1970   Date of Visit: 12/18/2020       To Whom It May Concern: It is my medical opinion that Masood Rudolph may return to work on 12/21/2020. If you have any questions or concerns, please don't hesitate to call.     Sincerely,          BRET Roman NP

## 2020-12-18 NOTE — TELEPHONE ENCOUNTER
Patient states that she is not having symptoms right now. I did tell her that she was going to write a letter for her to return on Monday and she is going to call the office back with a  Fax number.

## 2020-12-18 NOTE — TELEPHONE ENCOUNTER
Can we just ask if she is currently having any symptoms that she was having when seen on 12/16 or having a fever?    If no symptoms I will write her a rtw note, if she is still having some symptoms we will need to do virtual

## 2020-12-29 ENCOUNTER — TELEPHONE (OUTPATIENT)
Dept: PAIN MANAGEMENT | Age: 50
End: 2020-12-29

## 2020-12-29 NOTE — TELEPHONE ENCOUNTER
Called patient to schedule a consultation with Dr. Manuela Chavez. [place]. No Answer. Left message to return our call.

## 2020-12-30 ENCOUNTER — TELEPHONE (OUTPATIENT)
Dept: PAIN MANAGEMENT | Age: 50
End: 2020-12-30

## 2020-12-30 NOTE — TELEPHONE ENCOUNTER
Called patient to schedule a consultation with Dr. Marika Giraldo. [place]. No Answer. Left message to return our call.      This is our third attempt to reach this patient    12/18/2020 12/29/2020 12/30/2020 Yes

## 2020-12-31 NOTE — TELEPHONE ENCOUNTER
Was able to reach patient today by phone. Patient is agreeable to consultation by Dr. Christen Bartlett Virtual Visit on 2/23/2021 at 240pm    Patient will stop in to sign a Release of Records from [facility].

## 2021-01-21 ENCOUNTER — TELEPHONE (OUTPATIENT)
Dept: FAMILY MEDICINE CLINIC | Age: 51
End: 2021-01-21

## 2021-01-21 NOTE — TELEPHONE ENCOUNTER
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 Patient is asking if a note could be written up stating she should not being doing this due to her lung disease. Feels she is very high risk. Fax: [employer] [employer] - Patient did not know the fax number. Patient e-mail: [email or web address]

## 2021-02-17 ENCOUNTER — TELEPHONE (OUTPATIENT)
Dept: PAIN MANAGEMENT | Age: 51
End: 2021-02-17

## 2021-09-22 ENCOUNTER — HOSPITAL ENCOUNTER (EMERGENCY)
Age: 51
Discharge: HOME | End: 2021-09-22
Attending: EMERGENCY MEDICINE
Payer: COMMERCIAL

## 2021-09-22 VITALS
HEART RATE: 95 BPM | OXYGEN SATURATION: 97 % | SYSTOLIC BLOOD PRESSURE: 128 MMHG | DIASTOLIC BLOOD PRESSURE: 87 MMHG | TEMPERATURE: 98.1 F | BODY MASS INDEX: 21.26 KG/M2 | HEIGHT: 63 IN | WEIGHT: 120 LBS | RESPIRATION RATE: 20 BRPM

## 2021-09-22 DIAGNOSIS — T78.40XA ALLERGIC REACTION, INITIAL ENCOUNTER: Primary | ICD-10-CM

## 2021-09-22 PROCEDURE — 6360000002 HC RX W HCPCS: Performed by: STUDENT IN AN ORGANIZED HEALTH CARE EDUCATION/TRAINING PROGRAM

## 2021-09-22 PROCEDURE — 99284 EMERGENCY DEPT VISIT MOD MDM: CPT

## 2021-09-22 PROCEDURE — 6370000000 HC RX 637 (ALT 250 FOR IP): Performed by: STUDENT IN AN ORGANIZED HEALTH CARE EDUCATION/TRAINING PROGRAM

## 2021-09-22 RX ORDER — FAMOTIDINE 20 MG/1
20 TABLET, FILM COATED ORAL ONCE
Status: COMPLETED | OUTPATIENT
Start: 2021-09-22 | End: 2021-09-22

## 2021-09-22 RX ORDER — DEXAMETHASONE 4 MG/1
10 TABLET ORAL ONCE
Status: COMPLETED | OUTPATIENT
Start: 2021-09-22 | End: 2021-09-22

## 2021-09-22 RX ORDER — DIPHENHYDRAMINE HYDROCHLORIDE 50 MG/ML
25 INJECTION INTRAMUSCULAR; INTRAVENOUS ONCE
Status: DISCONTINUED | OUTPATIENT
Start: 2021-09-22 | End: 2021-09-22

## 2021-09-22 RX ORDER — DIPHENHYDRAMINE HCL 25 MG
25 TABLET ORAL ONCE
Status: COMPLETED | OUTPATIENT
Start: 2021-09-22 | End: 2021-09-22

## 2021-09-22 RX ORDER — EPINEPHRINE 0.3 MG/.3ML
0.3 INJECTION SUBCUTANEOUS ONCE
Qty: 0.3 ML | Refills: 0 | Status: SHIPPED | OUTPATIENT
Start: 2021-09-22 | End: 2021-09-22

## 2021-09-22 RX ADMIN — DEXAMETHASONE 10 MG: 4 TABLET ORAL at 19:25

## 2021-09-22 RX ADMIN — FAMOTIDINE 20 MG: 20 TABLET ORAL at 19:25

## 2021-09-22 RX ADMIN — DIPHENHYDRAMINE HCL 25 MG: 25 TABLET ORAL at 19:25

## 2021-09-22 ASSESSMENT — ENCOUNTER SYMPTOMS
ABDOMINAL PAIN: 0
VOMITING: 0
BACK PAIN: 0
SORE THROAT: 0
SHORTNESS OF BREATH: 0
COUGH: 0
NAUSEA: 0

## 2021-09-22 NOTE — ED PROVIDER NOTES
[address]  Emergency Department Encounter  EmergencyMedicine Resident     Pt Maurizio Henson  MRN: [id number]  [place] 1970  Date of evaluation: 9/22/21  PCP:  BRET Luna NP    This patient was evaluated in the Emergency Department for symptoms described in the history of present illness. The patient was evaluated in the context of the global COVID-19 pandemic, which necessitated consideration that the patient might be at risk for infection with the SARS-CoV-2 virus that causes COVID-19. Institutional protocols and algorithms that pertain to the evaluation of patients at risk for COVID-19 are in a state of rapid change based on information released by regulatory bodies including the CDC and federal and state organizations. These policies and algorithms were followed during the patient's care in the ED. CHIEF COMPLAINT       Chief Complaint   Patient presents with    Allergic Reaction     HISTORY OF PRESENT ILLNESS  (Location/Symptom, Timing/Onset, Context/Setting, Quality, Duration, Modifying Factors, Severity.)      Leni Tejada is a 48 y.o. female who presents with 15-minute history of left lip swelling involving the upper and lower lip, patient states that she had meatloaf that her son made, however does not know what in it. Patient states that she is allergic to honey, same reaction when she has had, however does not know if there was honey in her meatloaf today. Does have a history of carrying around an EpiPen, however stopped doing that as it always expires. Denies other symptoms at this time including shortness of breath, chest pain, nausea, vomiting, lightheadedness, abdominal pain. Patient states that she is nervous at this time. Does have a history of COPD, does have a chronic COPD cough, however unchanged from previous. Denies fevers, chills.      PAST MEDICAL / SURGICAL / SOCIAL / FAMILY HISTORY      has a past medical history of Arthritis, COPD (chronic obstructive pulmonary disease) ([facility] [address] Former smoker, and MI, old.     has a past surgical history that includes Hysterectomy; Tympanostomy tube placement; Finger trigger release (Right, 09/10/2020); and Finger trigger release (Right, 9/10/2020). Social History     Socioeconomic History    Marital status:      Spouse name: Not on file    Number of children: Not on file    Years of education: Not on file    Highest education level: Not on file   Occupational History    Not on file   Tobacco Use    Smoking status: Current Every Day Smoker     Packs/day: 0.25     Types: Cigarettes     Start date: 8/31/1986    Smokeless tobacco: Never Used    Tobacco comment: pt smokes about 1 cigg daily   Vaping Use    Vaping Use: Never used   Substance and Sexual Activity    Alcohol use: No    Drug use: No    Sexual activity: Yes     Partners: Male   Other Topics Concern    Not on file   Social History Narrative    Not on file     Social Determinants of Health     Financial Resource Strain:     Difficulty of Paying Living Expenses:    Food Insecurity:     Worried About Running Out of Food in the Last Year:     Ran Out of Food in the Last Year:    Transportation Needs:     Lack of Transportation (Medical):      Lack of Transportation (Non-Medical):    Physical Activity:     Days of Exercise per Week:     Minutes of Exercise per Session:    Stress:     Feeling of Stress :    Social Connections:     Frequency of Communication with Friends and Family:     Frequency of Social Gatherings with Friends and Family:     Attends Gnosticist Services:     Active Member of Clubs or Organizations:     Attends Club or Organization Meetings:     Marital Status:    Intimate Partner Violence:     Fear of Current or Ex-Partner:     Emotionally Abused:     Physically Abused:     Sexually Abused:        Family History   Problem Relation Age of Onset    High Blood Pressure Father     Cancer Paternal Uncle  Cancer Paternal Grandmother        Allergies:  Honey    Home Medications:  Prior to Admission medications    Medication Sig Start Date End Date Taking? Authorizing Provider   EPINEPHrine (EPIPEN 2-YANET) 0.3 MG/0.3ML SOAJ injection Inject 0.3 mLs into the muscle once for 1 dose Use as directed for allergic reaction 9/22/21 9/22/21 Yes E Brooklyn Majano MD   ibuprofen (ADVIL;MOTRIN) 800 MG tablet Take 1 tablet by mouth every 8 hours as needed for Pain 12/16/20   BRET Ramirez NP   escitalopram (LEXAPRO) 10 MG tablet Take 1 tablet by mouth daily 8/13/20   BRET Simon CNP   nicotine (NICODERM CQ) 21 MG/24HR Place 1 patch onto the skin every 24 hours 8/13/20 12/16/20  BRET Simon CNP   VENTOLIN  (90 Base) MCG/ACT inhaler Inhale 2 puffs into the lungs every 4 hours as needed for Wheezing or Shortness of Breath 5/21/20   BRET Simon CNP   glycopyrrolate-formoterol (BEVESPI) 9-4.8 MCG/ACT AERO Inhale 2 puffs into the lungs 2 times daily    Historical Provider, MD       REVIEW OF SYSTEMS    (2-9 systems for level 4, 10 or more for level 5)      Review of Systems   Constitutional: Negative for chills and fever. HENT: Negative for sore throat. Eyes: Negative for visual disturbance. Respiratory: Negative for cough and shortness of breath. Cardiovascular: Negative for chest pain. Gastrointestinal: Negative for abdominal pain, nausea and vomiting. Endocrine: Negative for polyuria. Genitourinary: Negative for dysuria and hematuria. Musculoskeletal: Negative for back pain. Skin: Negative for pallor. Neurological: Negative for light-headedness and headaches. Psychiatric/Behavioral: Negative for confusion.        PHYSICAL EXAM   (up to 7 for level 4, 8 or more for level 5)      INITIAL VITALS:   BP (!) 145/88   Pulse 95   Temp 98.1 °F (36.7 °C) (Oral)   Resp 16   Ht 5' 3\" (1.6 m)   Wt 120 lb (54.4 kg)   SpO2 97%   BMI 21.26 kg/m²     Physical Exam  Constitutional:       General: She is not in acute distress. Appearance: Normal appearance. She is normal weight. HENT:      Head: Normocephalic. Nose: Nose normal.      Mouth/Throat:      Mouth: Mucous membranes are moist.      Pharynx: Oropharynx is clear. Comments: Back of pharynx is clear, no swelling noted or erythema  Left upper and lower corner of the lip are swollen    Eyes:      Extraocular Movements: Extraocular movements intact. Pupils: Pupils are equal, round, and reactive to light. Cardiovascular:      Rate and Rhythm: Regular rhythm. Tachycardia present. Pulses: Normal pulses. Heart sounds: Normal heart sounds. Comments: Patient attributes this to nervousness  Pulmonary:      Effort: Pulmonary effort is normal.      Breath sounds: Normal breath sounds. Abdominal:      Palpations: Abdomen is soft. Tenderness: There is no abdominal tenderness. There is no right CVA tenderness or left CVA tenderness. Musculoskeletal:      Cervical back: Normal range of motion and neck supple. No tenderness. Right lower leg: No edema. Left lower leg: No edema. Skin:     General: Skin is warm. Capillary Refill: Capillary refill takes less than 2 seconds. Findings: No rash. Neurological:      General: No focal deficit present. Mental Status: She is alert and oriented to person, place, and time. Psychiatric:         Mood and Affect: Mood normal.         DIFFERENTIAL  DIAGNOSIS     PLAN (LABS / IMAGING / EKG):  No orders of the defined types were placed in this encounter.       MEDICATIONS ORDERED:  Orders Placed This Encounter   Medications    DISCONTD: diphenhydrAMINE (BENADRYL) injection 25 mg    dexamethasone (DECADRON) tablet 10 mg    DISCONTD: famotidine (PEPCID) injection 20 mg    diphenhydrAMINE (BENADRYL) tablet 25 mg    famotidine (PEPCID) tablet 20 mg    EPINEPHrine (EPIPEN 2-YANET) 0.3 MG/0.3ML SOAJ injection     Sig: Inject 0.3 thisnote were completed with a voice recognition program.  Efforts were made to edit the dictations but occasionally words are mis-transcribed.)       Zachary Weiss MD  Resident  09/22/21 2016

## 2021-09-22 NOTE — ED TRIAGE NOTES
Mode of arrival (squad #, walk in, police, etc) : walk in        Chief complaint(s): Allergic reaction         Arrival Note (brief scenario, treatment PTA, etc). : Pt states she is allergic to honey. Pt states she has not had any that she is aware. C= \"Have you ever felt that you should Cut down on your drinking? \"  No  A= \"Have people Annoyed you by criticizing your drinking? \"  No  G= \"Have you ever felt bad or Guilty about your drinking? \"  No  E= \"Have you ever had a drink as an Eye-opener first thing in the morning to steady your nerves or to help a hangover? \"  No      Deferred []      Reason for deferring: N/A    *If yes to two or more: probable alcohol abuse. *

## 2021-09-22 NOTE — ED PROVIDER NOTES
EMERGENCY DEPARTMENT ENCOUNTER   ATTENDING ATTESTATION     Pt Name: Lakesha Hutchinson  MRN: [id number]  [place] 1970  Date of evaluation: 9/22/21       Lakesha Hutchinson is a 48 y.o. female who presents with Allergic Reaction      MDM: 80-year-old female presents for allergic reaction and left upper lip swelling. Patient reports a history of allergy to honey, states that she ate meatloaf with barbecue sauce on it and concerned that the barbecue sauce that honey in it as she started having lip swelling after eating it. Patient denies any difficulty swallowing, denies any feelings of throat closing or throat swelling, denies any associate abdominal pain nausea vomiting or rash, on initial exam patient in no acute distress, vitals reviewed patient mildly tachycardic, noted to have swelling in the left upper lip, no significant swelling in the posterior oropharynx, no stridor, no wheezing, no rashes, no urticaria noted, will provide symptomatic treatment with steroid and antihistamine and observe in ED    Patient reevaluated reports feeling better, heart rate improved, swelling improved, discussed with patient continue supportive care at home, will discharge home with prescription for EpiPen, discussed need for follow-up with PCP and return precautions, patient voiced understanding is comfortable with plan and discharge home    Patient/Guardian was informed of their diagnosis and told to follow up with PCP  in 1-3 days. Patient demonstrates understanding and agreement with the plan. They were given the opportunity to ask questions and those questions were answered to the best of our ability with the available information. Patient/Guardian told to return to the ED for any new, worsening, changing or persistent symptoms. This dictation was prepared using [employer] [facility] [employer] voice recognition software.       Vitals:   Vitals:    09/22/21 1904 09/22/21 1928 09/22/21 2014   BP: (!) 145/88     Pulse: 111 111 95   Resp: 16

## 2022-10-20 ENCOUNTER — HOSPITAL ENCOUNTER (EMERGENCY)
Age: 52
Discharge: HOME | End: 2022-10-21
Attending: EMERGENCY MEDICINE
Payer: COMMERCIAL

## 2022-10-20 VITALS
DIASTOLIC BLOOD PRESSURE: 83 MMHG | RESPIRATION RATE: 15 BRPM | HEIGHT: 63 IN | OXYGEN SATURATION: 98 % | HEART RATE: 86 BPM | WEIGHT: 115 LBS | SYSTOLIC BLOOD PRESSURE: 134 MMHG | TEMPERATURE: 97.9 F | BODY MASS INDEX: 20.38 KG/M2

## 2022-10-20 DIAGNOSIS — S63.642A SPRAIN OF METACARPOPHALANGEAL (MCP) JOINT OF LEFT THUMB, INITIAL ENCOUNTER: Primary | ICD-10-CM

## 2022-10-20 PROCEDURE — 99283 EMERGENCY DEPT VISIT LOW MDM: CPT

## 2022-10-20 PROCEDURE — 29125 APPL SHORT ARM SPLINT STATIC: CPT

## 2022-10-20 ASSESSMENT — PAIN DESCRIPTION - DESCRIPTORS: DESCRIPTORS: ACHING

## 2022-10-20 ASSESSMENT — PAIN DESCRIPTION - PAIN TYPE: TYPE: ACUTE PAIN

## 2022-10-20 ASSESSMENT — LIFESTYLE VARIABLES
HOW OFTEN DO YOU HAVE A DRINK CONTAINING ALCOHOL: NEVER
HOW MANY STANDARD DRINKS CONTAINING ALCOHOL DO YOU HAVE ON A TYPICAL DAY: PATIENT DOES NOT DRINK

## 2022-10-20 ASSESSMENT — PAIN SCALES - GENERAL: PAINLEVEL_OUTOF10: 8

## 2022-10-20 ASSESSMENT — PAIN - FUNCTIONAL ASSESSMENT: PAIN_FUNCTIONAL_ASSESSMENT: 0-10

## 2022-10-20 ASSESSMENT — PAIN DESCRIPTION - ORIENTATION: ORIENTATION: LEFT

## 2022-10-20 ASSESSMENT — PAIN DESCRIPTION - LOCATION: LOCATION: HAND

## 2022-10-20 ASSESSMENT — PAIN DESCRIPTION - FREQUENCY: FREQUENCY: CONTINUOUS

## 2022-10-21 ENCOUNTER — APPOINTMENT (OUTPATIENT)
Dept: GENERAL RADIOLOGY | Age: 52
End: 2022-10-21
Payer: COMMERCIAL

## 2022-10-21 PROCEDURE — 73120 X-RAY EXAM OF HAND: CPT

## 2022-10-21 PROCEDURE — 73130 X-RAY EXAM OF HAND: CPT

## 2022-10-21 PROCEDURE — 6370000000 HC RX 637 (ALT 250 FOR IP): Performed by: STUDENT IN AN ORGANIZED HEALTH CARE EDUCATION/TRAINING PROGRAM

## 2022-10-21 RX ORDER — ACETAMINOPHEN 500 MG
1000 TABLET ORAL EVERY 8 HOURS PRN
Qty: 24 TABLET | Refills: 0 | Status: SHIPPED | OUTPATIENT
Start: 2022-10-21 | End: 2022-11-28

## 2022-10-21 RX ORDER — ACETAMINOPHEN 500 MG
1000 TABLET ORAL ONCE
Status: COMPLETED | OUTPATIENT
Start: 2022-10-21 | End: 2022-10-21

## 2022-10-21 RX ORDER — IBUPROFEN 200 MG
400 TABLET ORAL ONCE
Status: COMPLETED | OUTPATIENT
Start: 2022-10-21 | End: 2022-10-21

## 2022-10-21 RX ORDER — NAPROXEN 500 MG/1
500 TABLET ORAL 2 TIMES DAILY WITH MEALS
Qty: 8 TABLET | Refills: 0 | Status: SHIPPED | OUTPATIENT
Start: 2022-10-21 | End: 2022-11-28

## 2022-10-21 RX ADMIN — IBUPROFEN 400 MG: 200 TABLET, FILM COATED ORAL at 00:15

## 2022-10-21 RX ADMIN — ACETAMINOPHEN 1000 MG: 500 TABLET, FILM COATED ORAL at 00:15

## 2022-10-21 ASSESSMENT — ENCOUNTER SYMPTOMS
CONSTIPATION: 0
SHORTNESS OF BREATH: 0
NAUSEA: 0
SORE THROAT: 0
PHOTOPHOBIA: 0
RHINORRHEA: 0
VOMITING: 0
DIARRHEA: 0
ANAL BLEEDING: 0
ABDOMINAL DISTENTION: 0
CHEST TIGHTNESS: 0

## 2022-10-21 ASSESSMENT — PAIN SCALES - GENERAL: PAINLEVEL_OUTOF10: 8

## 2022-10-21 NOTE — DISCHARGE INSTRUCTIONS
Please wear splint at all times until evaluated by orthopedic surgery. Please use Tylenol and Naprosyn for pain control.   If you develop any worsening of your symptoms or loss of strength and sensation return to the emergency department or call 911

## 2022-10-21 NOTE — ED TRIAGE NOTES
Mode of arrival (squad #, walk in, police, etc) : walk in        Chief complaint(s): Lt thumb pain        Arrival Note (brief scenario, treatment PTA, etc). : Pt states she has had lt thumb pain and issues with it getting stuck for the last two years. She states that when this happens, she \"snaps it back into place\". She states that this time she snapped it the wrong way and it is swollen and crooked. C= \"Have you ever felt that you should Cut down on your drinking? \"  no  A= \"Have people Annoyed you by criticizing your drinking? \"  no  G= \"Have you ever felt bad or Guilty about your drinking? \"  no  E= \"Have you ever had a drink as an Eye-opener first thing in the morning to steady your nerves or to help a hangover? \"  no

## 2022-10-21 NOTE — ED PROVIDER NOTES
Stability: Not on file       Family History   Problem Relation Age of Onset    High Blood Pressure Father     Cancer Paternal Uncle     Cancer Paternal Grandmother        Allergies:  Honey    Home Medications:  Prior to Admission medications    Medication Sig Start Date End Date Taking? Authorizing Provider   naproxen (NAPROSYN) 500 MG tablet Take 1 tablet by mouth 2 times daily (with meals) for 4 days 10/21/22 10/25/22 Yes Damian Street DO   acetaminophen (TYLENOL) 500 MG tablet Take 2 tablets by mouth every 8 hours as needed for Pain 10/21/22 10/25/22 Yes Damian Street DO   EPINEPHrine (EPIPEN 2-YANET) 0.3 MG/0.3ML SOAJ injection Inject 0.3 mLs into the muscle once for 1 dose Use as directed for allergic reaction 9/22/21 9/22/21  KELLIE Burns MD   ibuprofen (ADVIL;MOTRIN) 800 MG tablet Take 1 tablet by mouth every 8 hours as needed for Pain 12/16/20   BRET Johnson - NP   escitalopram (LEXAPRO) 10 MG tablet Take 1 tablet by mouth daily 8/13/20   BRET Casey CNP   nicotine (NICODERM CQ) 21 MG/24HR Place 1 patch onto the skin every 24 hours 8/13/20 12/16/20  BRET Casey CNP   VENTOLIN  (90 Base) MCG/ACT inhaler Inhale 2 puffs into the lungs every 4 hours as needed for Wheezing or Shortness of Breath 5/21/20   BRET Casey CNP   glycopyrrolate-formoterol (BEVESPI) 9-4.8 MCG/ACT AERO Inhale 2 puffs into the lungs 2 times daily    Historical Provider, MD       REVIEW OF SYSTEMS    (2-9 systems for level 4, 10 or more for level 5)      Review of Systems   Constitutional:  Negative for chills and fever. HENT:  Negative for rhinorrhea and sore throat. Eyes:  Negative for photophobia. Respiratory:  Negative for chest tightness and shortness of breath. Cardiovascular:  Negative for chest pain. Gastrointestinal:  Negative for abdominal distention, anal bleeding, constipation, diarrhea, nausea and vomiting. Endocrine: Negative for polyuria. Genitourinary:  Negative for difficulty urinating and flank pain. Musculoskeletal:  Negative for arthralgias. Skin:  Negative for rash. Neurological:  Negative for weakness and headaches. PHYSICAL EXAM   (up to 7 for level 4, 8 or more for level 5)      INITIAL VITALS:   /83   Pulse 86   Temp 97.9 °F (36.6 °C) (Tympanic)   Resp 15   Ht 5' 3\" (1.6 m)   Wt 115 lb (52.2 kg)   SpO2 98%   BMI 20.37 kg/m²     Physical Exam  Constitutional:       General: She is not in acute distress. Appearance: She is not ill-appearing. HENT:      Head: Normocephalic and atraumatic. Eyes:      Pupils: Pupils are equal, round, and reactive to light. Cardiovascular:      Rate and Rhythm: Normal rate. Pulses: Normal pulses. Heart sounds: Normal heart sounds. Pulmonary:      Effort: Pulmonary effort is normal.      Breath sounds: Normal breath sounds. Abdominal:      Palpations: Abdomen is soft. Tenderness: There is no abdominal tenderness. Musculoskeletal:      Comments: Left thumb tenderness mild swelling of the first MCP. Neurovascular intact. DIFFERENTIAL  DIAGNOSIS     PLAN (LABS / IMAGING / EKG):  Orders Placed This Encounter   Procedures    XR HAND LEFT (MIN 3 VIEWS)    [facility] Thumb Spica, Left       MEDICATIONS ORDERED:  Orders Placed This Encounter   Medications    acetaminophen (TYLENOL) tablet 1,000 mg    ibuprofen (ADVIL;MOTRIN) tablet 400 mg    naproxen (NAPROSYN) 500 MG tablet     Sig: Take 1 tablet by mouth 2 times daily (with meals) for 4 days     Dispense:  8 tablet     Refill:  0    acetaminophen (TYLENOL) 500 MG tablet     Sig: Take 2 tablets by mouth every 8 hours as needed for Pain     Dispense:  24 tablet     Refill:  0       DIAGNOSTIC RESULTS / EMERGENCY DEPARTMENT COURSE / MDM   LAB RESULTS:  No results found for this visit on 10/20/22.     RADIOLOGY:  XR HAND LEFT (MIN 3 VIEWS)    Result Date: 10/21/2022  No acute osseous abnormality. EKG Interpretation  none    EMERGENCY DEPARTMENT COURSE:  ED Course as of 10/21/22 0046   Fri Oct 21, 2022   0004 Jammed thumb today having pain. NVI no obvious deformity. Will get XR [ZE]   4949 No acute abnormality will discharge [ZE]      ED Course User Index  [ZE] Pablo Spangler DO       PROCEDURES:  Procedures     CONSULTS:  None    CRITICAL CARE:  none    MDM  Here for thumb injury no acute fracture on x-ray neurovascularly intact no obvious deformities. Likely thumb sprain placed in spica splint given Ortho follow-up patient discharged    FINAL IMPRESSION      1.  Sprain of metacarpophalangeal (MCP) joint of left thumb, initial encounter          DISPOSITION / PLAN     DISPOSITION Decision To Discharge 10/21/2022 12:45:20 AM      PATIENT REFERRED TO:  Stephanie Saldaña MD  [place] [address], [address] [facility] [address]  [phone]      Follow-up for left thumb pain    [facility]  [phone]    If symptoms worsen    DISCHARGE MEDICATIONS:  New Prescriptions    ACETAMINOPHEN (TYLENOL) 500 MG TABLET    Take 2 tablets by mouth every 8 hours as needed for Pain    NAPROXEN (NAPROSYN) 500 MG TABLET    Take 1 tablet by mouth 2 times daily (with meals) for 4 days       Rae Meza DO  Emergency Medicine Resident    (Please note that portions of thisnote were completed with a voice recognition program.  Efforts were made to edit the dictations but occasionally words are mis-transcribed.)        Pablo Spangler DO  Resident  10/21/22 3687

## 2022-10-21 NOTE — ED PROVIDER NOTES
[address] ED  eMERGENCY dEPARTMENT eNCOUnter   Attending Attestation     Pt Name: Lauren Camargo  MRN: [id number]  [place] 1970  Date of evaluation: 10/21/22       Lauren Camargo is a 46 y.o. female who presents with Hand Pain      History:   Left thumb pain. Patient states has been catching and feels very much like when she had a trigger finger on her right side. Exam: Vitals:   Vitals:    10/20/22 2347   BP: 134/83   Pulse: 86   Resp: 15   Temp: 97.9 °F (36.6 °C)   TempSrc: Tympanic   SpO2: 98%   Weight: 115 lb (52.2 kg)   Height: 5' 3\" (1.6 m)     Some swelling of the thumb with tenderness    I performed a history and physical examination of the patient and discussed management with the resident. I reviewed the residents note and agree with the documented findings and plan of care. Any areas of disagreement are noted on the chart. I was personally present for the key portions of any procedures. I have documented in the chart those procedures where I was not present during the key portions. I have personally reviewed all images and agree with the resident's interpretation. I have reviewed the emergency nurses triage note. I agree with the chief complaint, past medical history, past surgical history, allergies, medications, social and family history as documented unless otherwise noted below. Documentation of the HPI, Physical Exam and Medical Decision Making performed by medical students or scribes is based on my personal performance of the HPI, PE and MDM. I personally evaluated and examined the patient in conjunction with the APC and agree with the assessment, treatment plan, and disposition of the patient as recorded by the APC. Additional findings are as noted.     Laura Gross MD  Attending Emergency  Physician             Anastasiia Quinonez MD  10/21/22 6273

## 2022-10-25 ENCOUNTER — OFFICE VISIT (OUTPATIENT)
Dept: ORTHOPEDIC SURGERY | Age: 52
End: 2022-10-25
Payer: COMMERCIAL

## 2022-10-25 VITALS — BODY MASS INDEX: 20.2 KG/M2 | HEIGHT: 63 IN | WEIGHT: 114 LBS | RESPIRATION RATE: 14 BRPM

## 2022-10-25 DIAGNOSIS — M65.312 TRIGGER THUMB OF LEFT HAND: Primary | ICD-10-CM

## 2022-10-25 PROCEDURE — 20550 NJX 1 TENDON SHEATH/LIGAMENT: CPT | Performed by: ORTHOPAEDIC SURGERY

## 2022-10-25 RX ORDER — BETAMETHASONE SODIUM PHOSPHATE AND BETAMETHASONE ACETATE 3; 3 MG/ML; MG/ML
6 INJECTION, SUSPENSION INTRA-ARTICULAR; INTRALESIONAL; INTRAMUSCULAR; SOFT TISSUE ONCE
Status: COMPLETED | OUTPATIENT
Start: 2022-10-25 | End: 2022-10-25

## 2022-10-25 RX ORDER — LIDOCAINE HYDROCHLORIDE 10 MG/ML
1 INJECTION, SOLUTION INFILTRATION; PERINEURAL ONCE
Status: COMPLETED | OUTPATIENT
Start: 2022-10-25 | End: 2022-10-25

## 2022-10-25 RX ADMIN — BETAMETHASONE SODIUM PHOSPHATE AND BETAMETHASONE ACETATE 6 MG: 3; 3 INJECTION, SUSPENSION INTRA-ARTICULAR; INTRALESIONAL; INTRAMUSCULAR; SOFT TISSUE at 14:26

## 2022-10-25 RX ADMIN — LIDOCAINE HYDROCHLORIDE 1 ML: 10 INJECTION, SOLUTION INFILTRATION; PERINEURAL at 14:27

## 2022-10-25 NOTE — PROGRESS NOTES
Patient ID: Umesh Lang is a 46 y.o. female    Chief Compliant:  Chief Complaint   Patient presents with    Hand Pain     Left Thumb        Diagnostic imaging:          Assessment and Plan:  1. Trigger thumb of left hand      History of right trigger thumb treatment few years ago with excellent outcome    Left trigger thumb    Left trigger thumb injection    Follow up 3 weeks    An informed verbal consent for the procedure was obtained and risks including, but not limited to: allergy to medications, injection, bleeding, stiffness of joint, recurrence of symptoms, loss of function, swelling, drainage, irrigation, need for surgery and pseudo-septic inflammation, were explained to the patient. Also, discussed was the potential for further injections, irrigation and debridement and surgery. Alternate means of treatment have also been discussed with the patient. Administrations This Visit       betamethasone acetate-betamethasone sodium phosphate (CELESTONE) injection 6 mg       Admin Date  10/25/2022  14:26 Action  Given Dose  6 mg Route  Other Site  Finger (See Comments) Administered By  Pierce Harper LPN    Ordering Provider: Neyda Yen MD    NDC: 4687-2658-34    Lot#: [id number]    : [employer]    Patient Supplied?: No    Comments: left thumb              lidocaine 1 % injection 1 mL       Admin Date  10/25/2022  14:27 Action  Given Dose  1 mL Route  Other Site  Finger (See Comments) Administered By  Pierce Harper LPN    Ordering Provider: Neyda Yen MD    [address]: [id number]    Lot#: [id number]    : [email or web address]    Patient Supplied?: No    Comments: left thumb                      HPI:  This is a 46 y.o. female who presents to the clinic today for left thumb pain. Patient notes her left thumb is now locking. Patient states having her right trigger thumb released 2 years ago. Review of Systems   All other systems reviewed and are negative.       Past History:    Current Outpatient Medications:     naproxen (NAPROSYN) 500 MG tablet, Take 1 tablet by mouth 2 times daily (with meals) for 4 days, Disp: 8 tablet, Rfl: 0    acetaminophen (TYLENOL) 500 MG tablet, Take 2 tablets by mouth every 8 hours as needed for Pain, Disp: 24 tablet, Rfl: 0    EPINEPHrine (EPIPEN 2-YANET) 0.3 MG/0.3ML SOAJ injection, Inject 0.3 mLs into the muscle once for 1 dose Use as directed for allergic reaction, Disp: 0.3 mL, Rfl: 0    ibuprofen (ADVIL;MOTRIN) 800 MG tablet, Take 1 tablet by mouth every 8 hours as needed for Pain, Disp: 90 tablet, Rfl: 1    escitalopram (LEXAPRO) 10 MG tablet, Take 1 tablet by mouth daily, Disp: 90 tablet, Rfl: 1    nicotine (NICODERM CQ) 21 MG/24HR, Place 1 patch onto the skin every 24 hours, Disp: 45 patch, Rfl: 0    VENTOLIN  (90 Base) MCG/ACT inhaler, Inhale 2 puffs into the lungs every 4 hours as needed for Wheezing or Shortness of Breath, Disp: 1 Inhaler, Rfl: 1    glycopyrrolate-formoterol (BEVESPI) 9-4.8 MCG/ACT AERO, Inhale 2 puffs into the lungs 2 times daily, Disp: , Rfl:   Allergies   Allergen Reactions    Honey Swelling     Swelling of lips     Social History     Socioeconomic History    Marital status:      Spouse name: Not on file    Number of children: Not on file    Years of education: Not on file    Highest education level: Not on file   Occupational History    Not on file   Tobacco Use    Smoking status: Every Day     Packs/day: 0.25     Types: Cigarettes     Start date: 8/31/1986    Smokeless tobacco: Never    Tobacco comments:     pt smokes about 1 cigg daily   Vaping Use    Vaping Use: Never used   Substance and Sexual Activity    Alcohol use: No    Drug use: No    Sexual activity: Yes     Partners: Male   Other Topics Concern    Not on file   Social History Narrative    Not on file     Social Determinants of Health     Financial Resource Strain: Not on file   Food Insecurity: Not on file   Transportation Needs: Not on file accurate and complete. Tigre Alvarez MD 10/25/22       Scribe Attestation:  By signing my name below, Sinan Stephanie, attest that this documentation has been prepared under the direction and in the presence of Dr. Deisi Blackburn. Electronically signed: Travis Cade, 10/25/22     Please note that this chart was generated using voice recognition Dragon dictation software. Although every effort was made to ensure the accuracy of this automated transcription, some errors in transcription may have occurred.

## 2022-11-08 ENCOUNTER — TELEPHONE (OUTPATIENT)
Dept: ORTHOPEDIC SURGERY | Age: 52
End: 2022-11-08

## 2022-11-08 NOTE — TELEPHONE ENCOUNTER
LVM to patient that we received papers from [facility] for short term disability and why. Patient was seen 10/25 and received an injection. No letter nor mention being off work in office note. Will await call back before completing forms

## 2022-11-15 ENCOUNTER — OFFICE VISIT (OUTPATIENT)
Dept: ORTHOPEDIC SURGERY | Age: 52
End: 2022-11-15
Payer: COMMERCIAL

## 2022-11-15 VITALS — WEIGHT: 114 LBS | RESPIRATION RATE: 12 BRPM | BODY MASS INDEX: 20.2 KG/M2 | HEIGHT: 63 IN

## 2022-11-15 DIAGNOSIS — M65.312 TRIGGER THUMB OF LEFT HAND: Primary | ICD-10-CM

## 2022-11-15 PROCEDURE — 99213 OFFICE O/P EST LOW 20 MIN: CPT | Performed by: ORTHOPAEDIC SURGERY

## 2022-11-15 NOTE — PROGRESS NOTES
Patient ID: Lupis Degroot is a 46 y.o. female    Chief Compliant:  Chief Complaint   Patient presents with    Hand Pain     Trigger thumb        Diagnostic imaging:        Assessment and Plan:  1. Trigger thumb of left hand        Left trigger thumb thumb is definitely triggering more visibly on this visit than 3 weeks ago    Discussed treatment options patient would rather proceed with surgery then trial of another injection    Patient is candidate for left trigger thumb release, patient is agreeable to surgical intervention    We discussed risks of surgery and recovery process. Risks include infection, bleeding, neurologic injury, systemic and anesthetic complications, no relief of pain, need for future surgery. Follow up 2 weeks post op    HPI:  This is a 46 y.o. female who presents to the clinic today for left trigger thumb. Patient has history of right trigger thumb release with excellent outcome. Patient had left trigger thumb injection on 10/25/22 with some relief of triggering. Patient observes triggering of her left trigger finger in a flexed position and an extended position. Review of Systems   All other systems reviewed and are negative.       Past History:    Current Outpatient Medications:     naproxen (NAPROSYN) 500 MG tablet, Take 1 tablet by mouth 2 times daily (with meals) for 4 days, Disp: 8 tablet, Rfl: 0    acetaminophen (TYLENOL) 500 MG tablet, Take 2 tablets by mouth every 8 hours as needed for Pain, Disp: 24 tablet, Rfl: 0    EPINEPHrine (EPIPEN 2-YANET) 0.3 MG/0.3ML SOAJ injection, Inject 0.3 mLs into the muscle once for 1 dose Use as directed for allergic reaction, Disp: 0.3 mL, Rfl: 0    ibuprofen (ADVIL;MOTRIN) 800 MG tablet, Take 1 tablet by mouth every 8 hours as needed for Pain, Disp: 90 tablet, Rfl: 1    escitalopram (LEXAPRO) 10 MG tablet, Take 1 tablet by mouth daily, Disp: 90 tablet, Rfl: 1    nicotine (NICODERM CQ) 21 MG/24HR, Place 1 patch onto the skin every 24 hours, Disp: 45 patch, Rfl: 0    VENTOLIN  (90 Base) MCG/ACT inhaler, Inhale 2 puffs into the lungs every 4 hours as needed for Wheezing or Shortness of Breath, Disp: 1 Inhaler, Rfl: 1    glycopyrrolate-formoterol (BEVESPI) 9-4.8 MCG/ACT AERO, Inhale 2 puffs into the lungs 2 times daily, Disp: , Rfl:   Allergies   Allergen Reactions    Honey Swelling     Swelling of lips     Social History     Socioeconomic History    Marital status:      Spouse name: Not on file    Number of children: Not on file    Years of education: Not on file    Highest education level: Not on file   Occupational History    Not on file   Tobacco Use    Smoking status: Every Day     Packs/day: 0.25     Types: Cigarettes     Start date: 8/31/1986    Smokeless tobacco: Never    Tobacco comments:     pt smokes about 1 cigg daily   Vaping Use    Vaping Use: Never used   Substance and Sexual Activity    Alcohol use: No    Drug use: No    Sexual activity: Yes     Partners: Male   Other Topics Concern    Not on file   Social History Narrative    Not on file     Social Determinants of Health     Financial Resource Strain: Not on file   Food Insecurity: Not on file   Transportation Needs: Not on file   Physical Activity: Not on file   Stress: Not on file   Social Connections: Not on file   Intimate Partner Violence: Not on file   Housing Stability: Not on file     Past Medical History:   Diagnosis Date    Arthritis     COPD (chronic obstructive pulmonary disease) ([facility] [address]     Former smoker     MI, old      Past Surgical History:   Procedure Laterality Date    FINGER TRIGGER RELEASE Right 09/10/2020    thumb    FINGER TRIGGER RELEASE Right 9/10/2020    FINGER TRIGGER RELEASE - RIGHT THUMB performed by Cady Latham MD at . [facility] ([address] [place] [address])      TYMPANOSTOMY TUBE PLACEMENT      About 3 years ago per patient- b/l     Family History   Problem Relation Age of Onset    High Blood Pressure Father     Cancer Paternal Uncle     Cancer Paternal Grandmother         Physical Exam:  Vitals signs and nursing note reviewed. Constitutional:       Appearance: well-developed. HENT:      Head: Normocephalic and atraumatic. Nose: Nose normal.   Eyes:      Conjunctiva/sclera: Conjunctivae normal.   Neck:      Musculoskeletal: Normal range of motion and neck supple. Pulmonary:      Effort: Pulmonary effort is normal. No respiratory distress. Musculoskeletal:      Comments: Normal gait     Skin:     General: Skin is warm and dry. Neurological:      Mental Status: Alert and oriented to person, place, and time. Sensory: No sensory deficit. Psychiatric:         Behavior: Behavior normal.         Thought Content: Thought content normal.        Provider Attestation:  Sagar Ruby, personally performed the services described in this documentation. All medical record entries made by the scribe were at my direction and in my presence. I have reviewed the chart and discharge instructions and agree that the records reflect my personal performance and is accurate and complete. Thom Flores MD 11/15/22       Scribe Attestation:  By signing my name below, Annita Hightower, attest that this documentation has been prepared under the direction and in the presence of Dr. Myesha Zelaya. Electronically signed: Travis Metzger, 11/15/22     Please note that this chart was generated using voice recognition Dragon dictation software. Although every effort was made to ensure the accuracy of this automated transcription, some errors in transcription may have occurred.

## 2022-11-18 ENCOUNTER — TELEPHONE (OUTPATIENT)
Dept: ORTHOPEDIC SURGERY | Age: 52
End: 2022-11-18

## 2022-11-18 ENCOUNTER — HOSPITAL ENCOUNTER (OUTPATIENT)
Dept: PREADMISSION TESTING | Age: 52
Discharge: HOME | End: 2022-11-22
Payer: COMMERCIAL

## 2022-11-18 VITALS
RESPIRATION RATE: 18 BRPM | WEIGHT: 135 LBS | OXYGEN SATURATION: 100 % | BODY MASS INDEX: 23.92 KG/M2 | DIASTOLIC BLOOD PRESSURE: 78 MMHG | TEMPERATURE: 97.2 F | SYSTOLIC BLOOD PRESSURE: 133 MMHG | HEIGHT: 63 IN | HEART RATE: 86 BPM

## 2022-11-18 DIAGNOSIS — Z01.818 PREOP EXAMINATION: ICD-10-CM

## 2022-11-18 LAB
ABSOLUTE EOS #: 0.3 K/UL (ref 0–0.4)
ABSOLUTE LYMPH #: 3 K/UL (ref 1–4.8)
ABSOLUTE MONO #: 0.8 K/UL (ref 0.1–1.3)
ANION GAP SERPL CALCULATED.3IONS-SCNC: 8 MMOL/L (ref 9–17)
BASOPHILS # BLD: 1 % (ref 0–2)
BASOPHILS ABSOLUTE: 0.1 K/UL (ref 0–0.2)
BUN BLDV-MCNC: 9 MG/DL (ref 6–20)
CALCIUM SERPL-MCNC: 9.1 MG/DL (ref 8.6–10.4)
CHLORIDE BLD-SCNC: 103 MMOL/L (ref 98–107)
CO2: 29 MMOL/L (ref 20–31)
CREAT SERPL-MCNC: 0.88 MG/DL (ref 0.5–0.9)
EOSINOPHILS RELATIVE PERCENT: 2 % (ref 0–4)
GFR SERPL CREATININE-BSD FRML MDRD: >60 ML/MIN/1.73M2
GLUCOSE BLD-MCNC: 97 MG/DL (ref 70–99)
HCT VFR BLD CALC: 38.7 % (ref 36–46)
HEMOGLOBIN: 13.1 G/DL (ref 12–16)
LYMPHOCYTES # BLD: 29 % (ref 24–44)
MCH RBC QN AUTO: 29.4 PG (ref 26–34)
MCHC RBC AUTO-ENTMCNC: 33.9 G/DL (ref 31–37)
MCV RBC AUTO: 86.7 FL (ref 80–100)
MONOCYTES # BLD: 8 % (ref 1–7)
PDW BLD-RTO: 14 % (ref 11.5–14.9)
PLATELET # BLD: 189 K/UL (ref 150–450)
PMV BLD AUTO: 9.9 FL (ref 6–12)
POTASSIUM SERPL-SCNC: 3.5 MMOL/L (ref 3.7–5.3)
RBC # BLD: 4.46 M/UL (ref 4–5.2)
SEG NEUTROPHILS: 60 % (ref 36–66)
SEGMENTED NEUTROPHILS ABSOLUTE COUNT: 6.4 K/UL (ref 1.3–9.1)
SODIUM BLD-SCNC: 140 MMOL/L (ref 135–144)
WBC # BLD: 10.6 K/UL (ref 3.5–11)

## 2022-11-18 PROCEDURE — 85025 COMPLETE CBC W/AUTO DIFF WBC: CPT

## 2022-11-18 PROCEDURE — 80048 BASIC METABOLIC PNL TOTAL CA: CPT

## 2022-11-18 PROCEDURE — 36415 COLL VENOUS BLD VENIPUNCTURE: CPT

## 2022-11-18 PROCEDURE — APPSS30 APP SPLIT SHARED TIME 16-30 MINUTES: Performed by: NURSE PRACTITIONER

## 2022-11-18 PROCEDURE — 93005 ELECTROCARDIOGRAM TRACING: CPT | Performed by: ANESTHESIOLOGY

## 2022-11-18 ASSESSMENT — ENCOUNTER SYMPTOMS
BACK PAIN: 1
COUGH: 1
SHORTNESS OF BREATH: 1
GASTROINTESTINAL NEGATIVE: 1

## 2022-11-18 NOTE — DISCHARGE INSTRUCTIONS
Pre-op Instructions For Out-Patient Surgery    Medication Instructions:  Please stop herbs and any supplements now (includes vitamins and minerals). Please contact your surgeon and prescribing physician for pre-op instructions for any blood thinners. Ibuprofen and naproxen as directed. If you have inhalers/aerosol treatments at home, please use them the morning of your surgery and bring the inhalers with you to the hospital.    Please take the following medications the morning of your surgery with a sip of water:    inhaler    Surgery Instructions:  After midnight before surgery:  Do not eat or drink anything, including water, mints, gum, and hard candy. You may brush your teeth without swallowing. No smoking, chewing tobacco, or street drugs. Please shower or bathe before surgery. If you were given Surgical Scrub Chlorhexidine Gluconate Liquid (CHG), please shower the night before and the morning of your surgery following the detailed instructions you received during your pre-admission visit. Please do not wear any cologne, lotion, powder, deodorant, jewelry, piercings, perfume, makeup, nail polish, hair accessories, or hair spray on the day of surgery. Wear loose comfortable clothing. Leave your valuables at home. Bring a storage case for any glasses/contacts. An adult who is responsible for you MUST drive you home and should be with you for the first 24 hours after surgery. If having out-patient knee and foot surgeries, please arrange for planned crutches, walker, or wheelchair before arriving to the hospital.    The Day of Surgery:  Arrive at [facility] AT [facility] at the time directed by your surgeon and check in at the desk. If you have a living will or healthcare power of , please bring a copy. You will be taken to the pre-op holding area where you will be prepared for surgery.   A physical assessment will be performed by a nurse practitioner or house officer. Your IV will be started and you will meet your anesthesiologist.    When you go to surgery, your family will be directed to the surgical waiting room, where the doctor should speak with them after your surgery. After surgery, you will be taken to the recovery room then when you are awake and stable you will go to the short stay unit for preparation to be discharged. If you use a Bi-PAP or C-PAP machine, please bring it with you and leave it in the car in case it is needed in recovery room.

## 2022-11-18 NOTE — H&P
HISTORY and [facility] [id number]       NAME:  Biju Weinstein  MRN: [id number]   YOB: 1970   Date: 11/18/2022   Age: 46 y.o. Gender: female     COMPLAINT AND PRESENT HISTORY:   Biju Weinstein is 46 y.o.,  female, presents for pre-anesthesia/admission testing for TRIGGER THUMB RELEASE per Dr. Tigre Xiong . Primary dx: LEFT TRIGGER THUMB.    HPI:  Biju Weinstein is a 46 y.o., female, complain of trigger finger of the left thumb for two years and it getting worse in the last month. Pt states she was in the ER on 10/21/22 due to sever pain in the left thumb and difficulty moving it and it was swelling, she said \" snaps it back into place\". Pt denies any injury or trauma. Patient reports  limited range of motion in the left thumb with locking, and triggering  with sever pain as aching pain , rated 8/10 . Patient had steroid injection three weeks ago  per Dr Tigre Xiong without any improvement   Pt states she is talking ibuprofen and Tylenol for her low back pain . Patient denies numbness / tingling in the left  upper extremity. Patient feels well otherwise, no recent fever/chills, chest pain, shortness of breath. RECENT IMAGING R/T HPI   XR HAND LEFT (MIN 3 VIEWS)    FINDINGS:  Visualization of the ring finger proximal phalanx is limited due to jewelry. No acute fracture. No dislocation. Joint spaces are maintained. Impression  No acute osseous abnormality.     Review of additional significant medical hx:  COPD  Current medication r/t condition :     Old MI :  Currently no medication, Pt is not follow up with her PCP for more than three years due to insurance issue     ECG 2019  Narrative & Impression  Sinus rhythm with short MA  Otherwise normal ECG  When compared with ECG of 25-FEB-2018 18:13,  No significant change was found    Echocardiogram complete 2019  Narrative & Impression    [facility]     Transthoracic Echocardiography Report (TTE) Patient Name [facility]    Date of Study               11/05/2019                Minal Brambila      Date of      1970  Gender                      Female   Birth      Age          52 year(s)  Race                              Room Number              Height:                     63 inch, 160.02 cm      Corporate ID [id number]    Weight:                     111 pounds, 50.3 kg   #      Patient Acct [de-identified]   BSA:          1.51 m^2      BMI:      19.66   #                                                              kg/m^2      MR #         [id number]      Sonographer                 Shey Ruffin      Accession #  [id number]   Interpreting Physician      [address] [place] [address]      Fellow                   Referring Nurse                            Practitioner      Interpreting             Referring Physician         Reford See   Fellow     Type of Study      TTE procedure:2D Echocardiogram, M-Mode, Doppler, Color Doppler. Procedure Date  Date: 11/05/2019 Start: 08:21 AM     Study Location: [address] [place] [address]  Technical Quality: Fair visualization     Indications:Abnormal ECG and Vertigo. History / Tech. Comments:  smoker  family hx of heart disease     Patient Status: Outpatient     Height: 63 inches Weight: 111 pounds BSA: 1.51 m^2 BMI: 19.66 kg/m^2     Rhythm: Within normal limits HR: 77 bpm BP: 113/87 mmHg     CONCLUSIONS  Summary  Small LV cavity. Estimated LV EF 55 %. Mild left ventricular hypertrophy. Mild mitral regurgitation. Moderate tricuspid regurgitation. Estimated right ventricular systolic  pressure is 24 mmHg. Activity level:  Functional Capacity per patient:   1. Patient is  able to walk 2 city blocks on level ground without SOB. 2. Patient is able to climb 2 flights of stairs without SOB. Denies hx of MRSA infection. Denies hx of blood clots. Denies hx of any personal or family hx of complications w/anesthesia.    PAST MEDICAL HISTORY     Past Medical History:   Diagnosis Date    Arthritis     COPD (chronic obstructive pulmonary disease) ([facility] [address]     Former smoker     MI, old        SURGICAL HISTORY       Past Surgical History:   Procedure Laterality Date    FINGER TRIGGER RELEASE Right 09/10/2020    thumb    FINGER TRIGGER RELEASE Right 9/10/2020    FINGER TRIGGER RELEASE - RIGHT THUMB performed by Haresh Hodge MD at . [facility] [address] ([address] [place] [address])      TYMPANOSTOMY TUBE PLACEMENT      About 3 years ago per patient- b/l       SOCIAL HISTORY       Social History     Socioeconomic History    Marital status: Single     Spouse name: None    Number of children: None    Years of education: None    Highest education level: None   Tobacco Use    Smoking status: Every Day     Packs/day: 0.25     Types: Cigarettes     Start date: 8/31/1986    Smokeless tobacco: Never    Tobacco comments:     pt smokes about 1 cigg daily   Vaping Use    Vaping Use: Never used   Substance and Sexual Activity    Alcohol use: No    Drug use: No    Sexual activity: Yes     Partners: Male       REVIEW OF SYSTEMS      Allergies   Allergen Reactions    Honey Swelling     Swelling of lips       Current Outpatient Medications on File Prior to Encounter   Medication Sig Dispense Refill    naproxen (NAPROSYN) 500 MG tablet Take 1 tablet by mouth 2 times daily (with meals) for 4 days 8 tablet 0    acetaminophen (TYLENOL) 500 MG tablet Take 2 tablets by mouth every 8 hours as needed for Pain 24 tablet 0    EPINEPHrine (EPIPEN 2-YANET) 0.3 MG/0.3ML SOAJ injection Inject 0.3 mLs into the muscle once for 1 dose Use as directed for allergic reaction 0.3 mL 0    ibuprofen (ADVIL;MOTRIN) 800 MG tablet Take 1 tablet by mouth every 8 hours as needed for Pain 90 tablet 1    escitalopram (LEXAPRO) 10 MG tablet Take 1 tablet by mouth daily 90 tablet 1    VENTOLIN  (90 Base) MCG/ACT inhaler Inhale 2 puffs into the lungs every 4 hours as needed for Wheezing or Shortness of Breath 1 Inhaler 1 glycopyrrolate-formoterol (BEVESPI) 9-4.8 MCG/ACT AERO Inhale 2 puffs into the lungs 2 times daily       No current facility-administered medications on file prior to encounter. Review of Systems   Constitutional: Negative. Eyes:  Positive for visual disturbance. Eye glases   Respiratory:  Positive for cough and shortness of breath. Cardiovascular: Negative. Gastrointestinal: Negative. Genitourinary: Negative. Musculoskeletal:  Positive for arthralgias and back pain. Left thumb pain    Skin: Negative. Neurological: Negative. Hematological: Negative. Psychiatric/Behavioral: Negative. GENERAL PHYSICAL EXAM     Vitals: /78   Pulse 86   Temp 97.2 °F (36.2 °C) (Infrared)   Resp 18   Ht 5' 3\" (1.6 m)   Wt 135 lb (61.2 kg)   SpO2 100%   BMI 23.91 kg/m²               Physical Exam  Constitutional:       General: She is not in acute distress. Appearance: Normal appearance. She is normal weight. She is not ill-appearing. HENT:      Head: Normocephalic. Right Ear: External ear normal.      Left Ear: External ear normal.      Nose: Nose normal.      Mouth/Throat:      Mouth: Mucous membranes are moist.      Comments: Pt has full denture   Eyes:      General:         Right eye: No discharge. Left eye: No discharge. Pupils: Pupils are equal, round, and reactive to light. Cardiovascular:      Rate and Rhythm: Normal rate and regular rhythm. Pulses: Normal pulses. Radial pulses are 2+ on the right side and 2+ on the left side. Dorsalis pedis pulses are 2+ on the right side and 2+ on the left side. Posterior tibial pulses are 2+ on the right side and 2+ on the left side. Heart sounds: Normal heart sounds. No murmur heard. Pulmonary:      Effort: Pulmonary effort is normal.      Breath sounds: Normal breath sounds. No wheezing or rales. Abdominal:      General: Bowel sounds are normal. There is no distension. Palpations: Abdomen is soft. Tenderness: There is no abdominal tenderness. Musculoskeletal:         General: Swelling and tenderness present. Normal range of motion. Cervical back: Normal range of motion and neck supple. Right lower leg: No edema. Left lower leg: No edema. Comments:  Tenderness with palpation over the nodule distal to left thumb  MCP joint. Catching and triggering observed during active flexion/extension of the digit with swelling. Skin intact , no erythema . Skin:     General: Skin is warm and dry. Findings: No bruising, erythema or lesion. Neurological:      General: No focal deficit present. Mental Status: She is alert and oriented to person, place, and time. Motor: No weakness.       Gait: Gait normal.   Psychiatric:         Mood and Affect: Mood normal.         Behavior: Behavior normal.       LAB REVIEW     Lab Results   Component Value Date    WBC 10.6 11/18/2022    HGB 13.1 11/18/2022    HCT 38.7 11/18/2022    MCV 86.7 11/18/2022     11/18/2022     Lab Results   Component Value Date/Time     11/18/2022 10:39 AM    K 3.5 11/18/2022 10:39 AM     11/18/2022 10:39 AM    CO2 29 11/18/2022 10:39 AM    BUN 9 11/18/2022 10:39 AM    CREATININE 0.88 11/18/2022 10:39 AM    GLUCOSE 97 11/18/2022 10:39 AM    CALCIUM 9.1 11/18/2022 10:39 AM          PRELIMINARY EKG REVIEW, DATE: 11/18/22     Normal Sinus Rhythm  Normal ECG     SURGERY / PROVISIONAL DIAGNOSES:      TRIGGER THUMB RELEASE    LEFT TRIGGER THUMB    Patient Active Problem List    Diagnosis Date Noted    Trigger finger of right thumb 09/10/2020    Skier's thumb, right, subsequent encounter 08/13/2020    Pain of right thumb- Mild-moderate partial-thickness tear of the ulnar collateral ligament of the right thumb per MRI 3-17-20 02/13/2020    Mood swings 12/26/2019    Agitation 12/26/2019    Hot flashes, menopausal 12/26/2019    Recurrent urinary tract infection 11/20/2019 Abdominal bloating 11/20/2019    Tricuspid regurgitation 11/05/2019    Cyst of left sphenoid sinus 11/01/2019    Shortened MA interval 10/24/2019    Vitamin D deficiency 10/24/2019    Hyperglycemia 10/24/2019    Leukocytosis 10/24/2019    Vertigo 10/17/2019    Smokes and motivated to quit 12/06/2018    DDD (degenerative disc disease), lumbar 08/02/2018    Tympanostomy tube check 07/26/2018    Chronic bilateral low back pain with bilateral sciatica 07/18/2018    Chronic hip pain, right 07/18/2018    Chronic obstructive pulmonary disease ([facility] [address] 07/18/2018    History of myocardial infarction 07/18/2018           CLEARANCE:   Based on my personal evaluation of patient including review of patient's chart, lab result , ECG ,physical exam , and discussing the case with Dr. Rolando Saavedra , anesthesia, Orders received and no clearance required.       Total time spent on encounter- PAT provider minutes: 21-30 minutes     BRET Shaffer CNP on 11/18/2022 at 11:12 AM

## 2022-11-18 NOTE — H&P (VIEW-ONLY)
HISTORY and J[facility]re[id number]       NAME:  Serina Durán  MRN: [id number]   YOB: 1970   Date: 11/18/2022   Age: 46 y.o. Gender: female     COMPLAINT AND PRESENT HISTORY:   Serina Durán is 46 y.o.,  female, presents for pre-anesthesia/admission testing for TRIGGER THUMB RELEASE per Dr. Jermain Main . Primary dx: LEFT TRIGGER THUMB.    HPI:  Serina Durán is a 46 y.o., female, complain of trigger finger of the left thumb for two years and it getting worse in the last month. Pt states she was in the ER on 10/21/22 due to sever pain in the left thumb and difficulty moving it and it was swelling, she said \" snaps it back into place\". Pt denies any injury or trauma. Patient reports  limited range of motion in the left thumb with locking, and triggering  with sever pain as aching pain , rated 8/10 . Patient had steroid injection three weeks ago  per Dr Jermain Main without any improvement   Pt states she is talking ibuprofen and Tylenol for her low back pain . Patient denies numbness / tingling in the left  upper extremity. Patient feels well otherwise, no recent fever/chills, chest pain, shortness of breath. RECENT IMAGING R/T HPI   XR HAND LEFT (MIN 3 VIEWS)    FINDINGS:  Visualization of the ring finger proximal phalanx is limited due to jewelry. No acute fracture. No dislocation. Joint spaces are maintained. Impression  No acute osseous abnormality.     Review of additional significant medical hx:  COPD  Current medication r/t condition :     Old MI :  Currently no medication, Pt is not follow up with her PCP for more than three years due to insurance issue     ECG 2019  Narrative & Impression  Sinus rhythm with short [place]  Otherwise normal ECG  When compared with ECG of 25-FEB-2018 18:13,  No significant change was found    Echocardiogram complete 2019  Narrative & Impression    [facility]     Transthoracic Echocardiography Report (TTE) Patient Name [facility] SHERINE    Date of Study               11/05/2019                Maxine Richardson      Date of      1970  Gender                      Female   Birth      Age          52 year(s)  Race                              Room Number              Height:                     63 inch, 160.02 cm      Corporate ID [id number]    Weight:                     111 pounds, 50.3 kg   #      Patient Acct [de-identified]   BSA:          1.51 m^2      BMI:      19.66   #                                                              kg/m^2      MR #         [id number]      Sonographer                 Shey Ruffin      Accession #  [id number]   Interpreting Physician      [address] [place] [address]      Fellow                   Referring Nurse                            Practitioner      Interpreting             Referring Physician         Sabino Zuluaga     Type of Study      TTE procedure:2D Echocardiogram, M-Mode, Doppler, Color Doppler. Procedure Date  Date: 11/05/2019 Start: 08:21 AM     Study Location: [address] [place] [address]  Technical Quality: Fair visualization     Indications:Abnormal ECG and Vertigo. History / Tech. Comments:  smoker  family hx of heart disease     Patient Status: Outpatient     Height: 63 inches Weight: 111 pounds BSA: 1.51 m^2 BMI: 19.66 kg/m^2     Rhythm: Within normal limits HR: 77 bpm BP: 113/87 mmHg     CONCLUSIONS  Summary  Small LV cavity. Estimated LV EF 55 %. Mild left ventricular hypertrophy. Mild mitral regurgitation. Moderate tricuspid regurgitation. Estimated right ventricular systolic  pressure is 24 mmHg. Activity level:  Functional Capacity per patient:   1. Patient is  able to walk 2 city blocks on level ground without SOB. 2. Patient is able to climb 2 flights of stairs without SOB. Denies hx of MRSA infection. Denies hx of blood clots. Denies hx of any personal or family hx of complications w/anesthesia.    PAST MEDICAL HISTORY     Past Medical History:   Diagnosis Date    Arthritis     COPD (chronic obstructive pulmonary disease) ([facility] [address]     Former smoker     MI, old        SURGICAL HISTORY       Past Surgical History:   Procedure Laterality Date    FINGER TRIGGER RELEASE Right 09/10/2020    thumb    FINGER TRIGGER RELEASE Right 9/10/2020    FINGER TRIGGER RELEASE - RIGHT THUMB performed by Bria Bess MD at . [facility] [address] ([address] [place] [address])      TYMPANOSTOMY TUBE PLACEMENT      About 3 years ago per patient- b/l       SOCIAL HISTORY       Social History     Socioeconomic History    Marital status: Single     Spouse name: None    Number of children: None    Years of education: None    Highest education level: None   Tobacco Use    Smoking status: Every Day     Packs/day: 0.25     Types: Cigarettes     Start date: 8/31/1986    Smokeless tobacco: Never    Tobacco comments:     pt smokes about 1 cigg daily   Vaping Use    Vaping Use: Never used   Substance and Sexual Activity    Alcohol use: No    Drug use: No    Sexual activity: Yes     Partners: Male       REVIEW OF SYSTEMS      Allergies   Allergen Reactions    Honey Swelling     Swelling of lips       Current Outpatient Medications on File Prior to Encounter   Medication Sig Dispense Refill    naproxen (NAPROSYN) 500 MG tablet Take 1 tablet by mouth 2 times daily (with meals) for 4 days 8 tablet 0    acetaminophen (TYLENOL) 500 MG tablet Take 2 tablets by mouth every 8 hours as needed for Pain 24 tablet 0    EPINEPHrine (EPIPEN 2-YANET) 0.3 MG/0.3ML SOAJ injection Inject 0.3 mLs into the muscle once for 1 dose Use as directed for allergic reaction 0.3 mL 0    ibuprofen (ADVIL;MOTRIN) 800 MG tablet Take 1 tablet by mouth every 8 hours as needed for Pain 90 tablet 1    escitalopram (LEXAPRO) 10 MG tablet Take 1 tablet by mouth daily 90 tablet 1    VENTOLIN  (90 Base) MCG/ACT inhaler Inhale 2 puffs into the lungs every 4 hours as needed for Wheezing or Shortness of Breath 1 Inhaler 1 glycopyrrolate-formoterol (BEVESPI) 9-4.8 MCG/ACT AERO Inhale 2 puffs into the lungs 2 times daily       No current facility-administered medications on file prior to encounter. Review of Systems   Constitutional: Negative. Eyes:  Positive for visual disturbance. Eye glases   Respiratory:  Positive for cough and shortness of breath. Cardiovascular: Negative. Gastrointestinal: Negative. Genitourinary: Negative. Musculoskeletal:  Positive for arthralgias and back pain. Left thumb pain    Skin: Negative. Neurological: Negative. Hematological: Negative. Psychiatric/Behavioral: Negative. GENERAL PHYSICAL EXAM     Vitals: /78   Pulse 86   Temp 97.2 °F (36.2 °C) (Infrared)   Resp 18   Ht 5' 3\" (1.6 m)   Wt 135 lb (61.2 kg)   SpO2 100%   BMI 23.91 kg/m²               Physical Exam  Constitutional:       General: She is not in acute distress. Appearance: Normal appearance. She is normal weight. She is not ill-appearing. HENT:      Head: Normocephalic. Right Ear: External ear normal.      Left Ear: External ear normal.      Nose: Nose normal.      Mouth/Throat:      Mouth: Mucous membranes are moist.      Comments: Pt has full denture   Eyes:      General:         Right eye: No discharge. Left eye: No discharge. Pupils: Pupils are equal, round, and reactive to light. Cardiovascular:      Rate and Rhythm: Normal rate and regular rhythm. Pulses: Normal pulses. Radial pulses are 2+ on the right side and 2+ on the left side. Dorsalis pedis pulses are 2+ on the right side and 2+ on the left side. Posterior tibial pulses are 2+ on the right side and 2+ on the left side. Heart sounds: Normal heart sounds. No murmur heard. Pulmonary:      Effort: Pulmonary effort is normal.      Breath sounds: Normal breath sounds. No wheezing or rales. Abdominal:      General: Bowel sounds are normal. There is no distension. Palpations: Abdomen is soft. Tenderness: There is no abdominal tenderness. Musculoskeletal:         General: Swelling and tenderness present. Normal range of motion. Cervical back: Normal range of motion and neck supple. Right lower leg: No edema. Left lower leg: No edema. Comments:  Tenderness with palpation over the nodule distal to left thumb  MCP joint. Catching and triggering observed during active flexion/extension of the digit with swelling. Skin intact , no erythema . Skin:     General: Skin is warm and dry. Findings: No bruising, erythema or lesion. Neurological:      General: No focal deficit present. Mental Status: She is alert and oriented to person, place, and time. Motor: No weakness.       Gait: Gait normal.   Psychiatric:         Mood and Affect: Mood normal.         Behavior: Behavior normal.       LAB REVIEW     Lab Results   Component Value Date    WBC 10.6 11/18/2022    HGB 13.1 11/18/2022    HCT 38.7 11/18/2022    MCV 86.7 11/18/2022     11/18/2022     Lab Results   Component Value Date/Time     11/18/2022 10:39 AM    K 3.5 11/18/2022 10:39 AM     11/18/2022 10:39 AM    CO2 29 11/18/2022 10:39 AM    BUN 9 11/18/2022 10:39 AM    CREATININE 0.88 11/18/2022 10:39 AM    GLUCOSE 97 11/18/2022 10:39 AM    CALCIUM 9.1 11/18/2022 10:39 AM          PRELIMINARY EKG REVIEW, DATE: 11/18/22     Normal Sinus Rhythm  Normal ECG     SURGERY / PROVISIONAL DIAGNOSES:      TRIGGER THUMB RELEASE    LEFT TRIGGER THUMB    Patient Active Problem List    Diagnosis Date Noted    Trigger finger of right thumb 09/10/2020    Skier's thumb, right, subsequent encounter 08/13/2020    Pain of right thumb- Mild-moderate partial-thickness tear of the ulnar collateral ligament of the right thumb per MRI 3-17-20 02/13/2020    Mood swings 12/26/2019    Agitation 12/26/2019    Hot flashes, menopausal 12/26/2019    Recurrent urinary tract infection 11/20/2019 Abdominal bloating 11/20/2019    Tricuspid regurgitation 11/05/2019    Cyst of left sphenoid sinus 11/01/2019    Shortened FL interval 10/24/2019    Vitamin D deficiency 10/24/2019    Hyperglycemia 10/24/2019    Leukocytosis 10/24/2019    Vertigo 10/17/2019    Smokes and motivated to quit 12/06/2018    DDD (degenerative disc disease), lumbar 08/02/2018    Tympanostomy tube check 07/26/2018    Chronic bilateral low back pain with bilateral sciatica 07/18/2018    Chronic hip pain, right 07/18/2018    Chronic obstructive pulmonary disease ([facility] [address] 07/18/2018    History of myocardial infarction 07/18/2018           CLEARANCE:   Based on my personal evaluation of patient including review of patient's chart, lab result , ECG ,physical exam , and discussing the case with Dr. Rolando Saavedra , anesthesia, Orders received and no clearance required.       Total time spent on encounter- PAT provider minutes: 21-30 minutes     BRET Shaffer CNP on 11/18/2022 at 11:12 AM

## 2022-11-18 NOTE — TELEPHONE ENCOUNTER
Patient is asking for a return call from Owen Franco as soon as possible regarding her STD paperwork. She is asking to pick it up today after her pre-admin testing at [facility] (10:30 am). She is asking for a return call and if she is not able to answer, please leave a detailed voicemail. Thank you.

## 2022-11-18 NOTE — TELEPHONE ENCOUNTER
Called patient and advised that paperwork will be signed Tuesday she can be called and or the paperwork can be faxed. Patient advised that she will need an LAMIN if patient wishes for it to be sent via fax

## 2022-11-21 LAB
EKG ATRIAL RATE: 83 BPM
EKG P AXIS: 57 DEGREES
EKG P-R INTERVAL: 146 MS
EKG Q-T INTERVAL: 366 MS
EKG QRS DURATION: 86 MS
EKG QTC CALCULATION (BAZETT): 430 MS
EKG R AXIS: 58 DEGREES
EKG T AXIS: 54 DEGREES
EKG VENTRICULAR RATE: 83 BPM

## 2022-11-25 ENCOUNTER — ANESTHESIA EVENT (OUTPATIENT)
Dept: OPERATING ROOM | Age: 52
End: 2022-11-25
Payer: COMMERCIAL

## 2022-11-25 NOTE — PRE-PROCEDURE INSTRUCTIONS
No answer, left message ? Unable to leave message ? When were you told to arrive at hospital ?  [id number]    Do you have a  ?y    Are you on any blood thinners ? n                  If yes when did you stop taking ? Do you have your prep Rx filled and instruction ? Nothing to eat the day before , only clear liquids. Are you experiencing any covid symptoms ?n     Do you have any infections or rash we should be aware of ?n      Do you have the Hibiclens soap to use the night before and the morning of surgery ?y    Nothing to eat or drink after midnight, only a sip of water to take any medication instructed to take the night before.   Wear comfortable clothing, leave any valuables at home, remove any jewelry and body piercing . y

## 2022-11-28 ENCOUNTER — ANESTHESIA (OUTPATIENT)
Dept: OPERATING ROOM | Age: 52
End: 2022-11-28
Payer: COMMERCIAL

## 2022-11-28 ENCOUNTER — HOSPITAL ENCOUNTER (OUTPATIENT)
Age: 52
Setting detail: OUTPATIENT SURGERY
Discharge: HOME | End: 2022-11-28
Attending: ORTHOPAEDIC SURGERY | Admitting: ORTHOPAEDIC SURGERY
Payer: COMMERCIAL

## 2022-11-28 VITALS
RESPIRATION RATE: 16 BRPM | WEIGHT: 135 LBS | BODY MASS INDEX: 23.92 KG/M2 | HEIGHT: 63 IN | TEMPERATURE: 97.3 F | SYSTOLIC BLOOD PRESSURE: 127 MMHG | HEART RATE: 71 BPM | OXYGEN SATURATION: 99 % | DIASTOLIC BLOOD PRESSURE: 70 MMHG

## 2022-11-28 DIAGNOSIS — M65.312 TRIGGER THUMB, LEFT THUMB: Primary | ICD-10-CM

## 2022-11-28 PROCEDURE — 6360000002 HC RX W HCPCS: Performed by: NURSE ANESTHETIST, CERTIFIED REGISTERED

## 2022-11-28 PROCEDURE — 7100000001 HC PACU RECOVERY - ADDTL 15 MIN: Performed by: ORTHOPAEDIC SURGERY

## 2022-11-28 PROCEDURE — 3700000001 HC ADD 15 MINUTES (ANESTHESIA): Performed by: ORTHOPAEDIC SURGERY

## 2022-11-28 PROCEDURE — 6360000002 HC RX W HCPCS: Performed by: ANESTHESIOLOGY

## 2022-11-28 PROCEDURE — 7100000010 HC PHASE II RECOVERY - FIRST 15 MIN: Performed by: ORTHOPAEDIC SURGERY

## 2022-11-28 PROCEDURE — 3600000002 HC SURGERY LEVEL 2 BASE: Performed by: ORTHOPAEDIC SURGERY

## 2022-11-28 PROCEDURE — 2500000003 HC RX 250 WO HCPCS: Performed by: ORTHOPAEDIC SURGERY

## 2022-11-28 PROCEDURE — 2580000003 HC RX 258: Performed by: ANESTHESIOLOGY

## 2022-11-28 PROCEDURE — 6360000002 HC RX W HCPCS: Performed by: ORTHOPAEDIC SURGERY

## 2022-11-28 PROCEDURE — 7100000011 HC PHASE II RECOVERY - ADDTL 15 MIN: Performed by: ORTHOPAEDIC SURGERY

## 2022-11-28 PROCEDURE — 3700000000 HC ANESTHESIA ATTENDED CARE: Performed by: ORTHOPAEDIC SURGERY

## 2022-11-28 PROCEDURE — 2500000003 HC RX 250 WO HCPCS: Performed by: NURSE ANESTHETIST, CERTIFIED REGISTERED

## 2022-11-28 PROCEDURE — 2709999900 HC NON-CHARGEABLE SUPPLY: Performed by: ORTHOPAEDIC SURGERY

## 2022-11-28 PROCEDURE — 7100000000 HC PACU RECOVERY - FIRST 15 MIN: Performed by: ORTHOPAEDIC SURGERY

## 2022-11-28 PROCEDURE — 3600000012 HC SURGERY LEVEL 2 ADDTL 15MIN: Performed by: ORTHOPAEDIC SURGERY

## 2022-11-28 RX ORDER — DEXAMETHASONE SODIUM PHOSPHATE 4 MG/ML
INJECTION, SOLUTION INTRA-ARTICULAR; INTRALESIONAL; INTRAMUSCULAR; INTRAVENOUS; SOFT TISSUE PRN
Status: DISCONTINUED | OUTPATIENT
Start: 2022-11-28 | End: 2022-11-28 | Stop reason: SDUPTHER

## 2022-11-28 RX ORDER — SODIUM CHLORIDE 0.9 % (FLUSH) 0.9 %
5-40 SYRINGE (ML) INJECTION EVERY 12 HOURS SCHEDULED
Status: DISCONTINUED | OUTPATIENT
Start: 2022-11-28 | End: 2022-11-28 | Stop reason: HOSPADM

## 2022-11-28 RX ORDER — SODIUM CHLORIDE 9 MG/ML
INJECTION, SOLUTION INTRAVENOUS PRN
Status: DISCONTINUED | OUTPATIENT
Start: 2022-11-28 | End: 2022-11-28 | Stop reason: HOSPADM

## 2022-11-28 RX ORDER — ONDANSETRON 2 MG/ML
4 INJECTION INTRAMUSCULAR; INTRAVENOUS
Status: DISCONTINUED | OUTPATIENT
Start: 2022-11-28 | End: 2022-11-28 | Stop reason: HOSPADM

## 2022-11-28 RX ORDER — KETOROLAC TROMETHAMINE 30 MG/ML
INJECTION, SOLUTION INTRAMUSCULAR; INTRAVENOUS PRN
Status: DISCONTINUED | OUTPATIENT
Start: 2022-11-28 | End: 2022-11-28 | Stop reason: SDUPTHER

## 2022-11-28 RX ORDER — LIDOCAINE HYDROCHLORIDE 10 MG/ML
1 INJECTION, SOLUTION EPIDURAL; INFILTRATION; INTRACAUDAL; PERINEURAL
Status: DISCONTINUED | OUTPATIENT
Start: 2022-11-28 | End: 2022-11-28 | Stop reason: HOSPADM

## 2022-11-28 RX ORDER — SODIUM CHLORIDE 0.9 % (FLUSH) 0.9 %
5-40 SYRINGE (ML) INJECTION PRN
Status: DISCONTINUED | OUTPATIENT
Start: 2022-11-28 | End: 2022-11-28 | Stop reason: HOSPADM

## 2022-11-28 RX ORDER — ONDANSETRON 2 MG/ML
INJECTION INTRAMUSCULAR; INTRAVENOUS PRN
Status: DISCONTINUED | OUTPATIENT
Start: 2022-11-28 | End: 2022-11-28 | Stop reason: SDUPTHER

## 2022-11-28 RX ORDER — LIDOCAINE HYDROCHLORIDE 10 MG/ML
INJECTION, SOLUTION EPIDURAL; INFILTRATION; INTRACAUDAL; PERINEURAL PRN
Status: DISCONTINUED | OUTPATIENT
Start: 2022-11-28 | End: 2022-11-28 | Stop reason: ALTCHOICE

## 2022-11-28 RX ORDER — HYDROCODONE BITARTRATE AND ACETAMINOPHEN 5; 325 MG/1; MG/1
1 TABLET ORAL EVERY 6 HOURS PRN
Qty: 12 TABLET | Refills: 0 | Status: SHIPPED | OUTPATIENT
Start: 2022-11-28 | End: 2022-12-01

## 2022-11-28 RX ORDER — FENTANYL CITRATE 50 UG/ML
INJECTION, SOLUTION INTRAMUSCULAR; INTRAVENOUS PRN
Status: DISCONTINUED | OUTPATIENT
Start: 2022-11-28 | End: 2022-11-28 | Stop reason: SDUPTHER

## 2022-11-28 RX ORDER — PROPOFOL 10 MG/ML
INJECTION, EMULSION INTRAVENOUS PRN
Status: DISCONTINUED | OUTPATIENT
Start: 2022-11-28 | End: 2022-11-28 | Stop reason: SDUPTHER

## 2022-11-28 RX ORDER — CEFAZOLIN SODIUM 1 G/3ML
INJECTION, POWDER, FOR SOLUTION INTRAMUSCULAR; INTRAVENOUS PRN
Status: DISCONTINUED | OUTPATIENT
Start: 2022-11-28 | End: 2022-11-28

## 2022-11-28 RX ORDER — DIPHENHYDRAMINE HYDROCHLORIDE 50 MG/ML
12.5 INJECTION INTRAMUSCULAR; INTRAVENOUS
Status: DISCONTINUED | OUTPATIENT
Start: 2022-11-28 | End: 2022-11-28 | Stop reason: HOSPADM

## 2022-11-28 RX ORDER — LIDOCAINE HYDROCHLORIDE 20 MG/ML
INJECTION, SOLUTION EPIDURAL; INFILTRATION; INTRACAUDAL; PERINEURAL PRN
Status: DISCONTINUED | OUTPATIENT
Start: 2022-11-28 | End: 2022-11-28 | Stop reason: SDUPTHER

## 2022-11-28 RX ORDER — SODIUM CHLORIDE, SODIUM LACTATE, POTASSIUM CHLORIDE, CALCIUM CHLORIDE 600; 310; 30; 20 MG/100ML; MG/100ML; MG/100ML; MG/100ML
INJECTION, SOLUTION INTRAVENOUS CONTINUOUS
Status: DISCONTINUED | OUTPATIENT
Start: 2022-11-28 | End: 2022-11-28 | Stop reason: HOSPADM

## 2022-11-28 RX ADMIN — DEXAMETHASONE SODIUM PHOSPHATE 8 MG: 4 INJECTION, SOLUTION INTRAMUSCULAR; INTRAVENOUS at 13:29

## 2022-11-28 RX ADMIN — HYDROMORPHONE HYDROCHLORIDE 0.25 MG: 0.5 INJECTION, SOLUTION INTRAMUSCULAR; INTRAVENOUS; SUBCUTANEOUS at 14:34

## 2022-11-28 RX ADMIN — CEFAZOLIN 2000 MG: 10 INJECTION, POWDER, FOR SOLUTION INTRAVENOUS at 13:27

## 2022-11-28 RX ADMIN — LIDOCAINE HYDROCHLORIDE 100 MG: 20 INJECTION, SOLUTION EPIDURAL; INFILTRATION; INTRACAUDAL; PERINEURAL at 13:21

## 2022-11-28 RX ADMIN — PROPOFOL 200 MG: 10 INJECTION, EMULSION INTRAVENOUS at 13:21

## 2022-11-28 RX ADMIN — PROPOFOL 50 MG: 10 INJECTION, EMULSION INTRAVENOUS at 13:22

## 2022-11-28 RX ADMIN — HYDROMORPHONE HYDROCHLORIDE 0.25 MG: 0.5 INJECTION, SOLUTION INTRAMUSCULAR; INTRAVENOUS; SUBCUTANEOUS at 14:30

## 2022-11-28 RX ADMIN — FENTANYL CITRATE 100 MCG: 50 INJECTION, SOLUTION INTRAMUSCULAR; INTRAVENOUS at 13:18

## 2022-11-28 RX ADMIN — SODIUM CHLORIDE, POTASSIUM CHLORIDE, SODIUM LACTATE AND CALCIUM CHLORIDE: 600; 310; 30; 20 INJECTION, SOLUTION INTRAVENOUS at 12:29

## 2022-11-28 RX ADMIN — KETOROLAC TROMETHAMINE 30 MG: 30 INJECTION, SOLUTION INTRAMUSCULAR at 13:31

## 2022-11-28 RX ADMIN — ONDANSETRON 4 MG: 2 INJECTION INTRAMUSCULAR; INTRAVENOUS at 13:29

## 2022-11-28 ASSESSMENT — LIFESTYLE VARIABLES: SMOKING_STATUS: 1

## 2022-11-28 ASSESSMENT — PAIN SCALES - GENERAL
PAINLEVEL_OUTOF10: 5
PAINLEVEL_OUTOF10: 2
PAINLEVEL_OUTOF10: 2
PAINLEVEL_OUTOF10: 5

## 2022-11-28 ASSESSMENT — PAIN DESCRIPTION - LOCATION
LOCATION: HAND
LOCATION: WRIST
LOCATION: WRIST

## 2022-11-28 ASSESSMENT — PAIN - FUNCTIONAL ASSESSMENT: PAIN_FUNCTIONAL_ASSESSMENT: 0-10

## 2022-11-28 ASSESSMENT — PAIN DESCRIPTION - DESCRIPTORS: DESCRIPTORS: ACHING

## 2022-11-28 ASSESSMENT — PAIN DESCRIPTION - ORIENTATION
ORIENTATION: LEFT
ORIENTATION: LEFT
ORIENTATION: RIGHT

## 2022-11-28 ASSESSMENT — PAIN DESCRIPTION - PAIN TYPE
TYPE: SURGICAL PAIN
TYPE: SURGICAL PAIN

## 2022-11-28 NOTE — BRIEF OP NOTE
Brief Postoperative Note      Patient: Chris Ocasio  YOB: 1970  MRN: [id number]    Date of Procedure: 11/28/2022    Pre-Op Diagnosis: LEFT TRIGGER THUMB     Post-Op Diagnosis: Same       Procedure(s):  TRIGGER THUMB RELEASE    Surgeon(s):  Brodie Reed MD    Assistant:  * No surgical staff found *    Anesthesia: General    Estimated Blood Loss (mL): Minimal    Complications: None    Specimens:   * No specimens in log *    Implants:  * No implants in log *      Drains: * No LDAs found *    Findings: see dictation    Electronically signed by Brodie Reed MD on 11/28/2022 at 1:50 PM

## 2022-11-28 NOTE — INTERVAL H&P NOTE
Update History & Physical     The patient's History and Physical of 11-18-22 was reviewed with the patient. I personally examined the patient, I concur with above findings, there was no change EXCEPT:  Patient states hx of MI- states was in her early 19's- states mild, r/t stress, denies hx of stents. Uses albuterol inhaler PRN as well as Bevespi r/t COPD. Physical exam was completed today and unchanged from source note except:  Generalized swelling/tenderness is not present- localized to left thumb. Slight heart murmur noted today (noted in the past)- encouraged f/u with PCP to see if she still needs f/u with cardiology (no longer seeing, but did see in the past). Heart rhythm and rate remains regular and normal, all lung fields CTA as noted per source H&P.    NPO status: Patient states they have been NPO since before midnight. Medications taken TODAY (w/sip of water): No PO medications, used Bevespi this morning- unsure of what time. Blood thinners: IBU and Naproxen held at least 1 week. Personal or family hx of complications w/anesthesia: Denies. Nicotine status: Last smoked a cigarette last night at 23:49. Denies personal hx of MRSA. Denies personal hx of blood clots. Denies current CP, palpitations, dizziness, SOB, URI s/s, GI issues, fever/chills. BP (!) 147/87   Pulse 74   Temp 96.9 °F (36.1 °C) (Infrared)   Resp 18   Ht 5' 3\" (1.6 m)   Wt 135 lb (61.2 kg)   SpO2 96%   BMI 23.91 kg/m²     Review current vital signs per RN flow sheet.   (Notation: Medications listed are not currently reconciled at the signing of this H&P note, to be reconciled in pre-op per RN)    Most recent lab work reviewed:  Lab Results   Component Value Date     11/18/2022    K 3.5 (L) 11/18/2022     11/18/2022    CO2 29 11/18/2022    BUN 9 11/18/2022    CREATININE 0.88 11/18/2022    GLUCOSE 97 11/18/2022    CALCIUM 9.1 11/18/2022    PROT 7.3 10/21/2019    LABALBU 3.4 (L) 10/21/2019    BILITOT 0.38 10/21/2019    ALKPHOS 75 10/21/2019    AST 10 10/21/2019    ALT 9 10/21/2019    LABGLOM >60 11/18/2022    GFRAA >60 10/21/2019       Lab Results   Component Value Date    WBC 10.6 11/18/2022    HGB 13.1 11/18/2022    HCT 38.7 11/18/2022    MCV 86.7 11/18/2022     11/18/2022     Past Medical History:   Diagnosis Date    Arthritis     COPD (chronic obstructive pulmonary disease) ([facility])     Full dentures     Heart murmur     MI, old     Trigger finger of thumb     Hx of b/l     Past Surgical History:   Procedure Laterality Date    FINGER TRIGGER RELEASE Right 09/10/2020    FINGER TRIGGER RELEASE - RIGHT THUMB [facility] ([address] [place] [address]      TYMPANOSTOMY TUBE PLACEMENT      \"Long time\" ago     Patient Active Problem List   Diagnosis    Chronic bilateral low back pain with bilateral sciatica    Chronic hip pain, right    Chronic obstructive pulmonary disease ([address]    History of myocardial infarction    Tympanostomy tube check    DDD (degenerative disc disease), lumbar    Smokes and motivated to quit    Vertigo    Shortened MD interval    Vitamin D deficiency    Hyperglycemia    Leukocytosis    Cyst of left sphenoid sinus    Tricuspid regurgitation    Recurrent urinary tract infection    Abdominal bloating    Mood swings    Agitation    Hot flashes, menopausal    Pain of right thumb- Mild-moderate partial-thickness tear of the ulnar collateral ligament of the right thumb per MRI 3-17-20    Skier's thumb, right, subsequent encounter    Trigger finger of right thumb    Preop examination     CARDIAC TESTING REVIEW:  Stress test, 11-19-19:  Impression:  1. Nonspecific ST-T changes on resting EKG  2. Most likely negative near maximal stress ECG evaluation for myocardial ischemia. Note there is minimal J-point depression of the ST segment which is not diagnostic of ischemia  3. No significant cardiac arrhythmia  4.   Somewhat poor exercise tolerance KRISTAN Sprague M.D. Encouraged patient to follow back up with PCP now that she has insurance. Surgical site was confirmed per myself and the patient.     Electronically signed by BRET Deluca CNP on 11/28/2022 at 12:04 PM

## 2022-11-28 NOTE — DISCHARGE INSTRUCTIONS
DISCHARGE INSTRUCTIONS FOR HAND/WRIST SURGERY    In order to continue your care at home, please follow the instructions below. For General Anesthesia  Do not drink any alcoholic beverages or make any legal or important decisions for 24 hours. Diet    After general anesthesia, start out eating lightly (broth, soup, crackers, toast, etc.) advancing as tolerated to your usual diet. Try to avoid spicy or greasy/fatty foods for 24 hours. Drink plenty of fluids after surgery, unless you are on a fluid restriction. Avoid milk/milk product for several hours. Medications  Take medications as instructed by your surgeon. Please do not take prescribed pain medication with alcoholic beverages. When cleared to drive by your surgeon, please do not drive or operate machinery while taking any prescribed pain medication. Activities  As instructed by your surgeon  Limit your activities for 24 hours  Avoid heavy work or sports until surgeon approves. No lifting, pushing, pulling, twisting, or pressing down on hand or wrist.  No driving or operating machinery until cleared by surgeon. May shower as long as dressings stay dry with plastic bag coverage. Surgery Area  Always keep dressings/incisions clean, dry. Do not remove dressings until seen in office. To reduce swelling and pain, apply an ice pack for 20-30 min 4 times a day for 48 hours and keep surgical hand/wrist elevated above heart level with pillows. Call your surgeon for the following: You have pain that does not get better after you take pain medicine. For an oral temperature (by mouth) is 101 degrees or higher, chills, or excessive sweating. You have increasing and progressive bleeding or drainage from surgery site. Signs of an infection:  increased swelling, redness, warmth, or hardness around surgery area or yellow or green drainage from incision. If your fingers are pale, blue or cold to touch.   If swelling increases while elevated. Persistent nausea or vomiting and cant keep fluids down. If you are unable to urinate within 8 hours of surgery. Redness or swelling at IV site. For any questions or concerns you may have.

## 2022-11-28 NOTE — ANESTHESIA PRE PROCEDURE
1318    lidocaine PF 1 % injection 1 mL  1 mL IntraDERmal Once PRN Arina Lechuga MD        ceFAZolin (ANCEF) 2000 mg in dextrose 5 % 50 mL IVPB  2,000 mg IntraVENous Once Majo Jacinto MD           Allergies:     Allergies   Allergen Reactions    Honey Swelling     Swelling of lips       Problem List:    Patient Active Problem List   Diagnosis Code    Chronic bilateral low back pain with bilateral sciatica M54.42, M54.41, G89.29    Chronic hip pain, right M25.551, G89.29    Chronic obstructive pulmonary disease (HCC) J44.9    History of myocardial infarction I25.2    Tympanostomy tube check Z45.89    DDD (degenerative disc disease), lumbar M51.36    Smokes and motivated to quit F17.200    Vertigo R42    Shortened WA interval R94.31    Vitamin D deficiency E55.9    Hyperglycemia R73.9    Leukocytosis D72.829    Cyst of left sphenoid sinus J34.1    Tricuspid regurgitation I07.1    Recurrent urinary tract infection N39.0    Abdominal bloating R14.0    Mood swings R45.86    Agitation R45.1    Hot flashes, menopausal N95.1    Pain of right thumb- Mild-moderate partial-thickness tear of the ulnar collateral ligament of the right thumb per MRI 3-17-20 M79.644    Skier's thumb, right, subsequent encounter S63.641D    Trigger finger of right thumb M65.311    Preop examination Z01.818       Past Medical History:        Diagnosis Date    Arthritis     [facility]ch[facility] ([facility])     Full dentures     Heart murmur     MI, old     Trigger finger of thumb     Hx of b/l       Past Surgical History:        Procedure Laterality Date    FINGER TRIGGER RELEASE Right 09/10/2020    FINGER TRIGGER RELEASE - RIGHT THUMB performed by [facility]o [facility]into [facility] (CERVIX STATUS UNKNOWN)      TYMPANOSTOMY TUBE PLACEMENT      \"Long time\" ago       Social History:    Social History     Tobacco Use    Smoking status: Every Day     Packs/day: 0.50     Types: Cigarettes     Start date: 8/31/1986    Smokeless tobacco: Never   Substance Use Topics    Alcohol use: No                                Ready to quit: Not Answered  Counseling given: Not Answered      Vital Signs (Current):   Vitals:    11/28/22 1201   BP: (!) 147/87   Pulse: 74   Resp: 18   Temp: 96.9 °F (36.1 °C)   TempSrc: Infrared   SpO2: 96%   Weight: 135 lb (61.2 kg)   Height: 5' 3\" (1.6 m)                                              BP Readings from Last 3 Encounters:   11/28/22 (!) 147/87   11/18/22 133/78   10/20/22 134/83       NPO Status: Time of last liquid consumption: 2340                        Time of last solid consumption: 2200                        Date of last liquid consumption: 11/27/22                        Date of last solid food consumption: 11/27/22    BMI:   Wt Readings from Last 3 Encounters:   11/28/22 135 lb (61.2 kg)   11/18/22 135 lb (61.2 kg)   11/15/22 114 lb (51.7 kg)     Body mass index is 23.91 kg/m². CBC:   Lab Results   Component Value Date/Time    WBC 10.6 11/18/2022 10:39 AM    RBC 4.46 11/18/2022 10:39 AM    HGB 13.1 11/18/2022 10:39 AM    HCT 38.7 11/18/2022 10:39 AM    MCV 86.7 11/18/2022 10:39 AM    RDW 14.0 11/18/2022 10:39 AM     11/18/2022 10:39 AM       CMP:   Lab Results   Component Value Date/Time     11/18/2022 10:39 AM    K 3.5 11/18/2022 10:39 AM     11/18/2022 10:39 AM    CO2 29 11/18/2022 10:39 AM    BUN 9 11/18/2022 10:39 AM    CREATININE 0.88 11/18/2022 10:39 AM    GFRAA >60 10/21/2019 10:44 AM    LABGLOM >60 11/18/2022 10:39 AM    GLUCOSE 97 11/18/2022 10:39 AM    PROT 7.3 10/21/2019 10:44 AM    CALCIUM 9.1 11/18/2022 10:39 AM    BILITOT 0.38 10/21/2019 10:44 AM    ALKPHOS 75 10/21/2019 10:44 AM    AST 10 10/21/2019 10:44 AM    ALT 9 10/21/2019 10:44 AM       POC Tests: No results for input(s): POCGLU, POCNA, POCK, POCCL, POCBUN, POCHEMO, POCHCT in the last 72 hours.     Coags:   Lab Results   Component Value Date/Time    PROTIME 10.6 04/24/2017 10:15 AM    INR 1.0 04/24/2017 10:15 AM       HCG (If Applicable): No results found for: PREGTESTUR, PREGSERUM, HCG, HCGQUANT     ABGs: No results found for: PHART, PO2ART, ZLU1ZJQ, BXZ7SKC, BEART, K4URSBYW     Type & Screen (If Applicable):  No results found for: LABABO, LABRH    Drug/Infectious Status (If Applicable):  No results found for: HIV, HEPCAB    COVID-19 Screening (If Applicable):   Lab Results   Component Value Date/Time    COVID19 Not Detected 11/12/2020 08:35 PM    COVID19 Not Detected 06/17/2020 09:57 PM           Anesthesia Evaluation  Patient summary reviewed and Nursing notes reviewed no history of anesthetic complications:   Airway: Mallampati: II          Dental:    (+) upper dentures and lower dentures      Pulmonary:normal exam  breath sounds clear to auscultation  (+) COPD:  current smoker                           Cardiovascular:    (+) valvular problems/murmurs (Moderate tricuspid regurgitation):, past MI:, CAD:,       ECG reviewed  Rhythm: regular  Rate: normal  Echocardiogram reviewed                  Neuro/Psych:   (+) neuromuscular disease:,              ROS comment: DDD - lumbar  Vertigo GI/Hepatic/Renal:             Endo/Other:    (+) : arthritis: OA., .                 Abdominal:             Vascular: negative vascular ROS. Other Findings:           Anesthesia Plan      general     ASA 3       Induction: intravenous. MIPS: Postoperative opioids intended and Prophylactic antiemetics administered. Anesthetic plan and risks discussed with patient. Plan discussed with CRNA.                     Diane Garcia MD   11/28/2022

## 2022-11-28 NOTE — ANESTHESIA POSTPROCEDURE EVALUATION
Department of Anesthesiology  Postprocedure Note    Patient: Sandy Nicole  MRN: [id number]  YOB: 1970  Date of evaluation: 11/28/2022      Procedure Summary     Date: 11/28/22 Room / Location: [address] [place] [address]  / Papi Arauz    Anesthesia Start: 5942 Anesthesia Stop: 7259    Procedure: TRIGGER THUMB RELEASE (Left: Hand) Diagnosis:       Trigger thumb, left thumb      (LEFT TRIGGER THUMB)    Surgeons: Bria Bess MD Responsible Provider: Zeus Antonio MD    Anesthesia Type: General ASA Status: 3          Anesthesia Type: General    Judith Phase I: Judith Score: 10    Judith Phase II: Judith Score: 10      Anesthesia Post Evaluation    Comments: POST- ANESTHESIA EVALUATION       Pt Name: Sandy Nicole  MRN: [id number]  YOB: 1970  Date of evaluation: 11/28/2022  Time:  5:23 PM      /70   Pulse 71   Temp 97.3 °F (36.3 °C) (Infrared)   Resp 16   Ht 5' 3\" (1.6 m)   Wt 135 lb (61.2 kg)   SpO2 99%   BMI 23.91 kg/m²      Consciousness Level  Awake  Cardiopulmonary Status  Stable  Pain Adequately Treated YES  Nausea / Vomiting  NO  Adequate Hydration  YES  Anesthesia Related Complications NONE      Electronically signed by Zeus Antonio MD on 11/28/2022 at 5:23 PM

## 2022-11-29 NOTE — OP NOTE
[address]                 [address] [facility]                                OPERATIVE REPORT    PATIENT NAME: Kobe Mcclure                   :        1970  MED REC NO:   [id number]                              ROOM:  ACCOUNT NO:   [de-identified]                           ADMIT DATE: 2022  PROVIDER:     Lulú Cr    DATE OF PROCEDURE:  2022    PREOPERATIVE DIAGNOSIS:  Left trigger thumb. POSTOPERATIVE DIAGNOSIS:  Left trigger thumb. PROCEDURE PERFORMED:  Left trigger thumb release. OPERATING SURGEON:  Lulú Cr MD    ASSISTANT:  None. ANESTHESIA:  General.    BLOOD LOSS:  Minimal.    COMPLICATIONS:  None. SPECIMENS:  None. IMPLANTS:  None. DRAINS:  None. FINDINGS:  A1 pulley, transected under direct visualization. PROCEDURE IN DETAIL:  The patient taken to the operating room, placed  under LMA general anesthesia. Left upper extremity was prepped and  draped in the usual sterile fashion. Transverse incision was made over  the A1 pulley. Blunt dissection down to the A1 pulley longitudinally. The A1 pulley was then transected. The flexor tendon was inspected. The skin was reapproximated with interrupted 4-0 Prolene suture. Sterile dressing was applied. The patient taken to recovery room in  stable condition.         MAYRA Parrish    D: 2022 14:02:20       T: 2022 14:06:10     [id number]  Job#: [id number]     Doc#: [id number]    CC:

## 2022-12-13 ENCOUNTER — OFFICE VISIT (OUTPATIENT)
Dept: ORTHOPEDIC SURGERY | Age: 52
End: 2022-12-13

## 2022-12-13 VITALS — WEIGHT: 135 LBS | HEIGHT: 63 IN | RESPIRATION RATE: 14 BRPM | BODY MASS INDEX: 23.92 KG/M2

## 2022-12-13 DIAGNOSIS — Z98.890 S/P TRIGGER FINGER RELEASE: ICD-10-CM

## 2022-12-13 DIAGNOSIS — M65.312 TRIGGER THUMB OF LEFT HAND: Primary | ICD-10-CM

## 2022-12-13 PROCEDURE — 99024 POSTOP FOLLOW-UP VISIT: CPT | Performed by: ORTHOPAEDIC SURGERY

## 2022-12-13 RX ORDER — PREDNISONE 10 MG/1
TABLET ORAL
Qty: 15 TABLET | Refills: 0 | Status: SHIPPED | OUTPATIENT
Start: 2022-12-13 | End: 2022-12-23

## 2022-12-13 NOTE — PROGRESS NOTES
Patient ID: Sally Taylor is a 46 y.o. female    Chief Compliant:  Chief Complaint   Patient presents with    Post-Op Check     Left trigger thumb        Diagnostic imaging:        Assessment and Plan:  1. Trigger thumb of left hand    2. S/P trigger finger release        2 weeks post left trigger thumb release, healing well    OT    Prednisone    Follow up 2 weeks    HPI:  This is a 46 y.o. female who presents to the clinic today for 2 weeks post left trigger thumb release, 11/28/22. She is trying to move her thumb but it feels like somebody is \"stabbing the middle of her hand with a pencil\". Review of Systems   All other systems reviewed and are negative.       Past History:    Current Outpatient Medications:     naproxen (NAPROSYN) 500 MG tablet, Take 1 tablet by mouth 2 times daily (with meals) for 4 days, Disp: 8 tablet, Rfl: 0    acetaminophen (TYLENOL) 500 MG tablet, Take 2 tablets by mouth every 8 hours as needed for Pain, Disp: 24 tablet, Rfl: 0    EPINEPHrine (EPIPEN 2-YANET) 0.3 MG/0.3ML SOAJ injection, Inject 0.3 mLs into the muscle once for 1 dose Use as directed for allergic reaction, Disp: 0.3 mL, Rfl: 0    ibuprofen (ADVIL;MOTRIN) 800 MG tablet, Take 1 tablet by mouth every 8 hours as needed for Pain, Disp: 90 tablet, Rfl: 1    escitalopram (LEXAPRO) 10 MG tablet, Take 1 tablet by mouth daily, Disp: 90 tablet, Rfl: 1    VENTOLIN  (90 Base) MCG/ACT inhaler, Inhale 2 puffs into the lungs every 4 hours as needed for Wheezing or Shortness of Breath, Disp: 1 Inhaler, Rfl: 1    glycopyrrolate-formoterol (BEVESPI) 9-4.8 MCG/ACT AERO, Inhale 2 puffs into the lungs 2 times daily, Disp: , Rfl:   Allergies   Allergen Reactions    Honey Swelling     Swelling of lips     Social History     Socioeconomic History    Marital status: Single     Spouse name: Not on file    Number of children: Not on file    Years of education: Not on file    Highest education level: Not on file   Occupational History Not on file   Tobacco Use    Smoking status: Every Day     Packs/day: 0.50     Types: Cigarettes     Start date: 8/31/1986    Smokeless tobacco: Never   Vaping Use    Vaping Use: Never used   Substance and Sexual Activity    Alcohol use: No    Drug use: No    Sexual activity: Yes     Partners: Male   Other Topics Concern    Not on file   Social History Narrative    Not on file     Social Determinants of Health     Financial Resource Strain: Not on file   Food Insecurity: Not on file   Transportation Needs: Not on file   Physical Activity: Not on file   Stress: Not on file   Social Connections: Not on file   Intimate Partner Violence: Not on file   Housing Stability: Not on file     Past Medical History:   Diagnosis Date    Arthritis     COPD (chronic obstructive pulmonary disease) ([facility] [address]     Full dentures     Heart murmur     MI, old     Trigger finger of thumb     Hx of b/l     Past Surgical History:   Procedure Laterality Date    FINGER TRIGGER RELEASE Right 09/10/2020    FINGER TRIGGER RELEASE - RIGHT THUMB performed by Leanne Natarajan MD at [address] Left 11/28/2022    TRIGGER THUMB RELEASE performed by Leanne Natarajan MD at [address] ([address] [facility])      TYMPANOSTOMY TUBE PLACEMENT      \"Long time\" ago     Family History   Problem Relation Age of Onset    High Blood Pressure Father     Cancer Paternal Grandmother     Cancer Paternal Uncle         Physical Exam:  Vitals signs and nursing note reviewed. Constitutional:       Appearance: well-developed. HENT:      Head: Normocephalic and atraumatic. Nose: Nose normal.   Eyes:      Conjunctiva/sclera: Conjunctivae normal.   Neck:      Musculoskeletal: Normal range of motion and neck supple. Pulmonary:      Effort: Pulmonary effort is normal. No respiratory distress. Musculoskeletal:      Comments: Normal gait     Skin:     General: Skin is warm and dry.    Neurological:      Mental Status: Alert and oriented to person, place, and time. Sensory: No sensory deficit. Psychiatric:         Behavior: Behavior normal.         Thought Content: Thought content normal.    Sutures discontinued    Incision well-healed. Thumb demonstrates a little bit of swelling I believe the patient has been over protecting it and not moving she demonstrates some stiffness although motion of her thumb is quite fluid passively    Provider Attestation:  Jose Cruz Ruby, personally performed the services described in this documentation. All medical record entries made by the scribe were at my direction and in my presence. I have reviewed the chart and discharge instructions and agree that the records reflect my personal performance and is accurate and complete. Rene Harkins MD 12/13/22       Scribe Attestation:  By signing my name below, Madeline Zabala, attest that this documentation has been prepared under the direction and in the presence of Dr. Destiny De Jesus. Electronically signed: Travis Palmer, 12/13/22     Please note that this chart was generated using voice recognition Dragon dictation software. Although every effort was made to ensure the accuracy of this automated transcription, some errors in transcription may have occurred.

## 2022-12-14 ENCOUNTER — TELEPHONE (OUTPATIENT)
Dept: ORTHOPEDIC SURGERY | Age: 52
End: 2022-12-14

## 2022-12-14 DIAGNOSIS — M65.312 TRIGGER THUMB OF LEFT HAND: Primary | ICD-10-CM

## 2022-12-14 NOTE — TELEPHONE ENCOUNTER
Keyon Willson with [facility] the pt has a referral for PT for trigger thumb and would need to be changed to OT please advise, FAX new referral and order to [phone] attn: Keyon Willson,   voice line [phone] thank you

## 2022-12-18 PROBLEM — Z01.818 PREOP EXAMINATION: Status: RESOLVED | Noted: 2022-11-18 | Resolved: 2022-12-18

## 2022-12-22 ENCOUNTER — HOSPITAL ENCOUNTER (OUTPATIENT)
Dept: OCCUPATIONAL THERAPY | Age: 52
Setting detail: THERAPIES SERIES
Discharge: HOME | End: 2022-12-22

## 2022-12-22 NOTE — SIGNIFICANT EVENT
[] [address] [place] [address] Blvd.        Occupational Therapy       [address] [facility], 1st [address]       Phone: [phone]       Fax: [phone]  [x] [facility] @ [facility]  [address] [facility] [address]  [place], [address] [place] [address]  Phone [phone]  Fax [phone]          Occupational Therapy Cancel/No Show note    Date: 2022  Patient: Umesh Lang  : 1970  MRN: [id number]  Cancels/No Shows to date:     For today's appointment patient:  []  Cancelled  []  Rescheduled appointment  [x]  No-show     Reason given by patient:  []  Patient ill  []  Conflicting appointment  []  No transportation    []  Conflict with work  []  No reason given  []  Other:     Comments:      Electronically signed by: AARON Chirinos/TAZ

## 2023-01-03 ENCOUNTER — OFFICE VISIT (OUTPATIENT)
Dept: ORTHOPEDIC SURGERY | Age: 53
End: 2023-01-03

## 2023-01-03 VITALS — RESPIRATION RATE: 12 BRPM | HEIGHT: 63 IN | BODY MASS INDEX: 23.92 KG/M2 | WEIGHT: 135 LBS

## 2023-01-03 DIAGNOSIS — M65.312 TRIGGER THUMB OF LEFT HAND: Primary | ICD-10-CM

## 2023-01-03 PROCEDURE — 99024 POSTOP FOLLOW-UP VISIT: CPT | Performed by: ORTHOPAEDIC SURGERY

## 2023-01-03 NOTE — PROGRESS NOTES
Patient ID: Lay Woods is a 46 y.o. female    Chief Compliant:  Chief Complaint   Patient presents with    Post-Op Check     Left trigger thumb          Diagnostic imaging:        Assessment and Plan:  1. Trigger thumb of left hand        4 weeks post left trigger thumb release patient now has very good outcome. Stabbing pain in palm now gone still just a little bit of periincisional tenderness    Follow up prn    HPI:  This is a 46 y.o. female who presents to the clinic today for 4 weeks post left trigger thumb release, 11/28/22. Patient has had significant improvement to thumb mobility and pain without following with OT. Review of Systems   All other systems reviewed and are negative.       Past History:    Current Outpatient Medications:     naproxen (NAPROSYN) 500 MG tablet, Take 1 tablet by mouth 2 times daily (with meals) for 4 days, Disp: 8 tablet, Rfl: 0    acetaminophen (TYLENOL) 500 MG tablet, Take 2 tablets by mouth every 8 hours as needed for Pain, Disp: 24 tablet, Rfl: 0    EPINEPHrine (EPIPEN 2-YANET) 0.3 MG/0.3ML SOAJ injection, Inject 0.3 mLs into the muscle once for 1 dose Use as directed for allergic reaction, Disp: 0.3 mL, Rfl: 0    ibuprofen (ADVIL;MOTRIN) 800 MG tablet, Take 1 tablet by mouth every 8 hours as needed for Pain, Disp: 90 tablet, Rfl: 1    escitalopram (LEXAPRO) 10 MG tablet, Take 1 tablet by mouth daily, Disp: 90 tablet, Rfl: 1    VENTOLIN  (90 Base) MCG/ACT inhaler, Inhale 2 puffs into the lungs every 4 hours as needed for Wheezing or Shortness of Breath, Disp: 1 Inhaler, Rfl: 1    glycopyrrolate-formoterol (BEVESPI) 9-4.8 MCG/ACT AERO, Inhale 2 puffs into the lungs 2 times daily, Disp: , Rfl:   Allergies   Allergen Reactions    Honey Swelling     Swelling of lips     Social History     Socioeconomic History    Marital status: Single     Spouse name: Not on file    Number of children: Not on file    Years of education: Not on file    Highest education level: Not on file   Occupational History    Not on file   Tobacco Use    Smoking status: Every Day     Packs/day: 0.50     Types: Cigarettes     Start date: 8/31/1986    Smokeless tobacco: Never   Vaping Use    Vaping Use: Never used   Substance and Sexual Activity    Alcohol use: No    Drug use: No    Sexual activity: Yes     Partners: Male   Other Topics Concern    Not on file   Social History Narrative    Not on file     Social Determinants of Health     Financial Resource Strain: Not on file   Food Insecurity: Not on file   Transportation Needs: Not on file   Physical Activity: Not on file   Stress: Not on file   Social Connections: Not on file   Intimate Partner Violence: Not on file   Housing Stability: Not on file     Past Medical History:   Diagnosis Date    Arthritis     COPD (chronic obstructive pulmonary disease) ([facility] [address]     Full dentures     Heart murmur     MI, old     Trigger finger of thumb     Hx of b/l     Past Surgical History:   Procedure Laterality Date    FINGER TRIGGER RELEASE Right 09/10/2020    FINGER TRIGGER RELEASE - RIGHT THUMB performed by Shakir Kimball MD at [address] [place] [address] Left 11/28/2022    TRIGGER THUMB RELEASE performed by Shakir Kimball MD at [facility] (CERVIX STATUS UNKNOWN)      TYMPANOSTOMY TUBE PLACEMENT      \"Long time\" ago     Family History   Problem Relation Age of Onset    High Blood Pressure Father     Cancer Paternal Grandmother     Cancer Paternal Uncle         Physical Exam:  Vitals signs and nursing note reviewed. Constitutional:       Appearance: well-developed. HENT:      Head: Normocephalic and atraumatic. Nose: Nose normal.   Eyes:      Conjunctiva/sclera: Conjunctivae normal.   Neck:      Musculoskeletal: Normal range of motion and neck supple. Pulmonary:      Effort: Pulmonary effort is normal. No respiratory distress. Musculoskeletal:      Comments: Normal gait     Skin:     General: Skin is warm and dry. Neurological:      Mental Status: Alert and oriented to person, place, and time. Sensory: No sensory deficit. Psychiatric:         Behavior: Behavior normal.         Thought Content: Thought content normal.  Thumb demonstrates fluid full range of motion      Provider Attestation:  Daryn Ruby, personally performed the services described in this documentation. All medical record entries made by the scribe were at my direction and in my presence. I have reviewed the chart and discharge instructions and agree that the records reflect my personal performance and is accurate and complete. Alicia Sandoval MD 1/3/23       Scribe Attestation:  By signing my name below, Cloyde Schlatter, attest that this documentation has been prepared under the direction and in the presence of Dr. Darilyn Spatz. Electronically signed: Travis Randolph, 1/3/23     Please note that this chart was generated using voice recognition Dragon dictation software. Although every effort was made to ensure the accuracy of this automated transcription, some errors in transcription may have occurred.

## 2023-01-13 ENCOUNTER — TELEPHONE (OUTPATIENT)
Dept: ORTHOPEDIC SURGERY | Age: 53
End: 2023-01-13

## 2023-01-13 NOTE — LETTER
[facility]  [address] [place] [address]  Phone: [phone]  Fax: [phone]    Trent Ruby MD        January 19, 2023     Patient: Elvi Mejia   YOB: 1970   Date of Visit: 1/13/2023       To Whom It May Concern:    It is my medical opinion that Elvi Mejia may return to work on 1/9/23 with the following restrictions: no restrictions .    If you have any questions or concerns, please don't hesitate to call.    Sincerely,        Trent Ruby MD

## 2023-01-13 NOTE — TELEPHONE ENCOUNTER
The patient called today stating that she returned to work on January 9, 2023 and needs a letter returning her to work on that day. Is it okay to to write the letter?     11/28/22  Left trigger thumb release

## 2023-01-17 NOTE — TELEPHONE ENCOUNTER
Patient called and stated she returned to work on 01/09/2023 with no restrictions. She asked if the letter could be entered in her MyChart so she could show her boss. She also requested call after 4:30 pm due to her schedule. Thank you.

## 2023-01-18 NOTE — TELEPHONE ENCOUNTER
Dr. Johnson Bounds any issue with writing a return to work letter? Patient is asking for a letter stating that she could return to work back on 1/9/23 without any restrictions.

## 2023-07-18 ENCOUNTER — HOSPITAL ENCOUNTER (EMERGENCY)
Age: 53
Discharge: HOME | End: 2023-07-19
Attending: STUDENT IN AN ORGANIZED HEALTH CARE EDUCATION/TRAINING PROGRAM
Payer: COMMERCIAL

## 2023-07-18 ENCOUNTER — APPOINTMENT (OUTPATIENT)
Dept: GENERAL RADIOLOGY | Age: 53
End: 2023-07-18
Payer: COMMERCIAL

## 2023-07-18 VITALS
TEMPERATURE: 98.6 F | HEIGHT: 63 IN | RESPIRATION RATE: 18 BRPM | BODY MASS INDEX: 22.15 KG/M2 | WEIGHT: 125 LBS | OXYGEN SATURATION: 97 % | DIASTOLIC BLOOD PRESSURE: 95 MMHG | SYSTOLIC BLOOD PRESSURE: 154 MMHG | HEART RATE: 86 BPM

## 2023-07-18 DIAGNOSIS — R09.89 GLOBUS PHARYNGEUS: ICD-10-CM

## 2023-07-18 DIAGNOSIS — J02.9 SORE THROAT: Primary | ICD-10-CM

## 2023-07-18 PROCEDURE — 6370000000 HC RX 637 (ALT 250 FOR IP): Performed by: STUDENT IN AN ORGANIZED HEALTH CARE EDUCATION/TRAINING PROGRAM

## 2023-07-18 PROCEDURE — 99284 EMERGENCY DEPT VISIT MOD MDM: CPT

## 2023-07-18 PROCEDURE — 87880 STREP A ASSAY W/OPTIC: CPT

## 2023-07-18 PROCEDURE — 87636 SARSCOV2 & INF A&B AMP PRB: CPT

## 2023-07-18 PROCEDURE — 71046 X-RAY EXAM CHEST 2 VIEWS: CPT

## 2023-07-18 PROCEDURE — 70360 X-RAY EXAM OF NECK: CPT

## 2023-07-18 RX ORDER — IBUPROFEN 600 MG/1
600 TABLET ORAL ONCE
Status: COMPLETED | OUTPATIENT
Start: 2023-07-18 | End: 2023-07-18

## 2023-07-18 RX ADMIN — IBUPROFEN 600 MG: 600 TABLET, FILM COATED ORAL at 23:37

## 2023-07-18 RX ADMIN — BENZOCAINE 6 MG-MENTHOL 10 MG LOZENGES 1 LOZENGE: at 23:37

## 2023-07-18 ASSESSMENT — PAIN DESCRIPTION - ONSET: ONSET: GRADUAL

## 2023-07-18 ASSESSMENT — PAIN DESCRIPTION - ORIENTATION: ORIENTATION: POSTERIOR

## 2023-07-18 ASSESSMENT — ENCOUNTER SYMPTOMS
VOMITING: 0
NAUSEA: 0
EYE REDNESS: 0
EYE DISCHARGE: 0
COUGH: 1
SHORTNESS OF BREATH: 0
ABDOMINAL PAIN: 0
SORE THROAT: 1
RHINORRHEA: 0
DIARRHEA: 0

## 2023-07-18 ASSESSMENT — PAIN - FUNCTIONAL ASSESSMENT: PAIN_FUNCTIONAL_ASSESSMENT: 0-10

## 2023-07-18 ASSESSMENT — PAIN DESCRIPTION - FREQUENCY: FREQUENCY: CONTINUOUS

## 2023-07-18 ASSESSMENT — PAIN DESCRIPTION - DESCRIPTORS: DESCRIPTORS: ACHING

## 2023-07-18 ASSESSMENT — PAIN DESCRIPTION - PAIN TYPE: TYPE: ACUTE PAIN

## 2023-07-18 ASSESSMENT — PAIN DESCRIPTION - LOCATION: LOCATION: RIB CAGE

## 2023-07-19 LAB
FLUAV RNA RESP QL NAA+PROBE: NOT DETECTED
FLUBV RNA RESP QL NAA+PROBE: NOT DETECTED
S PYO AG THROAT QL: NEGATIVE
SARS-COV-2 RNA RESP QL NAA+PROBE: NOT DETECTED
SOURCE: NORMAL
SPECIMEN DESCRIPTION: NORMAL
SPECIMEN SOURCE: NORMAL

## 2023-07-19 RX ORDER — IBUPROFEN 600 MG/1
600 TABLET ORAL 3 TIMES DAILY PRN
Qty: 30 TABLET | Refills: 0 | Status: SHIPPED | OUTPATIENT
Start: 2023-07-19

## 2023-07-19 NOTE — ED PROVIDER NOTES
EMERGENCY DEPARTMENT ENCOUNTER    Pt Name: Rodriguez Bowen  MRN: [id number]  [address] [place] 1970  Date of evaluation: 7/18/23  CHIEF COMPLAINT       Chief Complaint   Patient presents with    Pharyngitis     Pt reports a \"bug stuck in throat\" for the last week. Cough    Rib Pain (injury)     HISTORY OF PRESENT ILLNESS   46year-old history of COPD presenting with sore throat and cough    Patient states that about a week ago she felt like maybe a bug flew into her throat she is having some irritation in her throat since    Frequently coughing and irritation    Having some mild bilateral rib pain from coughing    No other chest pain. No drooling. No difficulty swallowing. No change in her voice. She is eating and drinking. Having bowel movements. REVIEW OF SYSTEMS     Review of Systems   Constitutional:  Negative for chills and fever. HENT:  Positive for sore throat. Negative for rhinorrhea. Eyes:  Negative for discharge and redness. Respiratory:  Positive for cough. Negative for shortness of breath. Cardiovascular:  Negative for chest pain. Gastrointestinal:  Negative for abdominal pain, diarrhea, nausea and vomiting. Genitourinary:  Negative for dysuria, frequency and urgency. Musculoskeletal:  Negative for arthralgias and myalgias. Skin:  Negative for rash. Neurological:  Negative for weakness and numbness. Psychiatric/Behavioral:  Negative for suicidal ideas. All other systems reviewed and are negative.   PASTMEDICAL HISTORY     Past Medical History:   Diagnosis Date    Arthritis     COPD (chronic obstructive pulmonary disease) ([facility])     Full dentures     Heart murmur     MI, old     Trigger finger of thumb     Hx of b/l     Past Problem List  Patient Active Problem List   Diagnosis Code    Chronic bilateral low back pain with bilateral sciatica M54.42, M54.41, G89.29    Chronic hip pain, right M25.551, G89.29    Chronic obstructive pulmonary disease ([address]) J44.9    History

## 2023-07-19 NOTE — ED TRIAGE NOTES
Mode of arrival (squad #, walk in, police, etc) : walk in        Chief complaint(s): \"Bug stuck in her throat\"        Arrival Note (brief scenario, treatment PTA, etc). : Pt reports that she has had a bug stuck in her throat for the last week. Pt is not aware of any bugs going into her mouth. Pt reports cough and posterior rib pain when coughing. C= \"Have you ever felt that you should Cut down on your drinking? \"  No  A= \"Have people Annoyed you by criticizing your drinking? \"  No  G= \"Have you ever felt bad or Guilty about your drinking? \"  No  E= \"Have you ever had a drink as an Eye-opener first thing in the morning to steady your nerves or to help a hangover? \"  No      Deferred []      Reason for deferring: N/A    *If yes to two or more: probable alcohol abuse. *

## 2023-07-19 NOTE — ED NOTES
The following labs labeled with pt sticker and tubed to lab:     [x] COVID-19 swab & Flu  [] RSV  [x] Strep          Ryann Hutton RN  07/18/23 4942

## 2023-11-20 ENCOUNTER — APPOINTMENT (OUTPATIENT)
Dept: GENERAL RADIOLOGY | Age: 53
End: 2023-11-20
Payer: COMMERCIAL

## 2023-11-20 ENCOUNTER — HOSPITAL ENCOUNTER (EMERGENCY)
Age: 53
Discharge: HOME | End: 2023-11-21
Attending: EMERGENCY MEDICINE
Payer: COMMERCIAL

## 2023-11-20 DIAGNOSIS — M62.838 SPASM OF MUSCLE: Primary | ICD-10-CM

## 2023-11-20 LAB
ANION GAP SERPL CALCULATED.3IONS-SCNC: 12 MMOL/L (ref 9–17)
BASOPHILS # BLD: 0.1 K/UL (ref 0–0.2)
BASOPHILS NFR BLD: 1 % (ref 0–2)
BUN SERPL-MCNC: 10 MG/DL (ref 6–20)
CALCIUM SERPL-MCNC: 10 MG/DL (ref 8.6–10.4)
CHLORIDE SERPL-SCNC: 100 MMOL/L (ref 98–107)
CO2 SERPL-SCNC: 27 MMOL/L (ref 20–31)
CREAT SERPL-MCNC: 1 MG/DL (ref 0.5–0.9)
EOSINOPHIL # BLD: 0.29 K/UL (ref 0–0.4)
EOSINOPHILS RELATIVE PERCENT: 3 % (ref 0–4)
ERYTHROCYTE [DISTWIDTH] IN BLOOD BY AUTOMATED COUNT: 14 % (ref 11.5–14.9)
GFR SERPL CREATININE-BSD FRML MDRD: >60 ML/MIN/1.73M2
GLUCOSE SERPL-MCNC: 95 MG/DL (ref 70–99)
HCT VFR BLD AUTO: 42.6 % (ref 36–46)
HGB BLD-MCNC: 14.1 G/DL (ref 12–16)
LYMPHOCYTES NFR BLD: 2.88 K/UL (ref 1–4.8)
LYMPHOCYTES RELATIVE PERCENT: 30 % (ref 24–44)
MAGNESIUM SERPL-MCNC: 2.2 MG/DL (ref 1.6–2.6)
MCH RBC QN AUTO: 29.8 PG (ref 26–34)
MCHC RBC AUTO-ENTMCNC: 33.2 G/DL (ref 31–37)
MCV RBC AUTO: 89.7 FL (ref 80–100)
MONOCYTES NFR BLD: 0.67 K/UL (ref 0.1–1.3)
MONOCYTES NFR BLD: 7 % (ref 1–7)
MORPHOLOGY: NORMAL
NEUTROPHILS NFR BLD: 59 % (ref 36–66)
NEUTS SEG NFR BLD: 5.66 K/UL (ref 1.3–9.1)
PLATELET # BLD AUTO: 200 K/UL (ref 150–450)
PMV BLD AUTO: 9.9 FL (ref 6–12)
POTASSIUM SERPL-SCNC: 4.1 MMOL/L (ref 3.7–5.3)
RBC # BLD AUTO: 4.75 M/UL (ref 4–5.2)
SODIUM SERPL-SCNC: 139 MMOL/L (ref 135–144)
TROPONIN I SERPL HS-MCNC: <6 NG/L (ref 0–14)
TROPONIN I SERPL HS-MCNC: <6 NG/L (ref 0–14)
WBC OTHER # BLD: 9.6 K/UL (ref 3.5–11)

## 2023-11-20 PROCEDURE — 85025 COMPLETE CBC W/AUTO DIFF WBC: CPT

## 2023-11-20 PROCEDURE — 80048 BASIC METABOLIC PNL TOTAL CA: CPT

## 2023-11-20 PROCEDURE — 6370000000 HC RX 637 (ALT 250 FOR IP): Performed by: EMERGENCY MEDICINE

## 2023-11-20 PROCEDURE — 93005 ELECTROCARDIOGRAM TRACING: CPT | Performed by: EMERGENCY MEDICINE

## 2023-11-20 PROCEDURE — 6360000002 HC RX W HCPCS: Performed by: EMERGENCY MEDICINE

## 2023-11-20 PROCEDURE — 96372 THER/PROPH/DIAG INJ SC/IM: CPT

## 2023-11-20 PROCEDURE — 71045 X-RAY EXAM CHEST 1 VIEW: CPT

## 2023-11-20 PROCEDURE — 36415 COLL VENOUS BLD VENIPUNCTURE: CPT

## 2023-11-20 PROCEDURE — 83735 ASSAY OF MAGNESIUM: CPT

## 2023-11-20 PROCEDURE — 84484 ASSAY OF TROPONIN QUANT: CPT

## 2023-11-20 PROCEDURE — 99285 EMERGENCY DEPT VISIT HI MDM: CPT

## 2023-11-20 RX ORDER — LIDOCAINE 4 G/G
1 PATCH TOPICAL DAILY
Status: DISCONTINUED | OUTPATIENT
Start: 2023-11-20 | End: 2023-11-21 | Stop reason: HOSPADM

## 2023-11-20 RX ORDER — LIDOCAINE 4 G/G
1 PATCH TOPICAL DAILY
Status: DISCONTINUED | OUTPATIENT
Start: 2023-11-21 | End: 2023-11-20 | Stop reason: SDUPTHER

## 2023-11-20 RX ORDER — ORPHENADRINE CITRATE 30 MG/ML
60 INJECTION INTRAMUSCULAR; INTRAVENOUS ONCE
Status: COMPLETED | OUTPATIENT
Start: 2023-11-20 | End: 2023-11-20

## 2023-11-20 RX ADMIN — ORPHENADRINE CITRATE 60 MG: 30 INJECTION INTRAMUSCULAR; INTRAVENOUS at 21:42

## 2023-11-20 ASSESSMENT — PAIN SCALES - GENERAL
PAINLEVEL_OUTOF10: 3
PAINLEVEL_OUTOF10: 5
PAINLEVEL_OUTOF10: 5

## 2023-11-20 ASSESSMENT — PAIN DESCRIPTION - ORIENTATION
ORIENTATION: LEFT
ORIENTATION: LEFT

## 2023-11-20 ASSESSMENT — PAIN - FUNCTIONAL ASSESSMENT: PAIN_FUNCTIONAL_ASSESSMENT: 0-10

## 2023-11-20 ASSESSMENT — PAIN DESCRIPTION - LOCATION
LOCATION: ARM;SHOULDER
LOCATION: ARM;SHOULDER

## 2023-11-21 VITALS
TEMPERATURE: 98.1 F | BODY MASS INDEX: 20.38 KG/M2 | DIASTOLIC BLOOD PRESSURE: 75 MMHG | WEIGHT: 115 LBS | OXYGEN SATURATION: 96 % | HEIGHT: 63 IN | HEART RATE: 65 BPM | RESPIRATION RATE: 13 BRPM | SYSTOLIC BLOOD PRESSURE: 110 MMHG

## 2023-11-21 LAB
EKG ATRIAL RATE: 87 BPM
EKG P AXIS: 65 DEGREES
EKG P-R INTERVAL: 124 MS
EKG Q-T INTERVAL: 348 MS
EKG QRS DURATION: 88 MS
EKG QTC CALCULATION (BAZETT): 418 MS
EKG R AXIS: 69 DEGREES
EKG T AXIS: 66 DEGREES
EKG VENTRICULAR RATE: 87 BPM

## 2023-11-21 PROCEDURE — 93010 ELECTROCARDIOGRAM REPORT: CPT | Performed by: INTERNAL MEDICINE

## 2023-11-21 RX ORDER — MECLIZINE HYDROCHLORIDE 25 MG/1
12.5 TABLET ORAL 3 TIMES DAILY PRN
Qty: 10 TABLET | Refills: 0 | Status: SHIPPED | OUTPATIENT
Start: 2023-11-21 | End: 2023-12-01

## 2023-11-21 RX ORDER — MECLIZINE HYDROCHLORIDE 25 MG/1
12.5 TABLET ORAL 3 TIMES DAILY PRN
Qty: 10 TABLET | Refills: 0 | Status: SHIPPED | OUTPATIENT
Start: 2023-11-21 | End: 2023-11-21 | Stop reason: SDUPTHER

## 2023-11-21 ASSESSMENT — ENCOUNTER SYMPTOMS
SHORTNESS OF BREATH: 0
ABDOMINAL PAIN: 0

## 2023-11-21 NOTE — DISCHARGE INSTRUCTIONS
You were seen here for left-sided shoulder pain. Cardiac work-up was performed however it was negative. Your shoulder pain is likely related to a muscle spasm. Call and schedule follow-up appointment with your primary care provider for soon as possible. Return to the emergency department immediately if you experience worsening symptoms, develop any new symptoms, or if you have any other concerns.

## 2023-11-21 NOTE — ED TRIAGE NOTES
Mode of arrival (squad #, walk in, police, etc) : Walk in        Chief complaint(s): L shoulder pain, dizziness        Arrival Note (brief scenario, treatment PTA, etc). : Pt c/o dizziness that has progressively gotten worse. Pt has hx of vertigo. Pt also c/o L shoulder blade pain that radiates down to her L elbow since 1700 today. Pt denies chest pain. C= \"Have you ever felt that you should Cut down on your drinking? \"  No  A= \"Have people Annoyed you by criticizing your drinking? \"  No  G= \"Have you ever felt bad or Guilty about your drinking? \"  No  E= \"Have you ever had a drink as an Eye-opener first thing in the morning to steady your nerves or to help a hangover? \"  No      Deferred []      Reason for deferring: N/A    *If yes to two or more: probable alcohol abuse. *

## 2023-11-21 NOTE — ED NOTES
Pt was discharged from the ER. Discharge paperwork was reviewed with the patient. Patient had no further questions and showed an understanding of instructions.        Charu Chen RN  11/21/23 3432

## 2023-11-21 NOTE — ED PROVIDER NOTES
Discharge 11/21/2023 12:12:32 AM      PATIENT REFERRED TO:  BRET Lucio - NP  [address] [phone]    Schedule an appointment as soon as possible for a visit       [facility] ED  [address]  [address] [facility]  [phone]    As needed, If symptoms worsen      DISCHARGE MEDICATIONS:  Discharge Medication List as of 11/21/2023 12:15 Aneta Verduzco MD  Emergency Medicine Resident    (Please note that portions of this note were completed with a voice recognition program.  Efforts were made to edit the dictations but occasionally words are mis-transcribed.)      Gareth Bui MD  Resident  11/21/23 8903

## 2024-05-12 ENCOUNTER — HOSPITAL ENCOUNTER (EMERGENCY)
Age: 54
Discharge: HOME | End: 2024-05-13
Attending: EMERGENCY MEDICINE
Payer: COMMERCIAL

## 2024-05-12 VITALS
TEMPERATURE: 98.1 F | DIASTOLIC BLOOD PRESSURE: 95 MMHG | HEIGHT: 63 IN | HEART RATE: 91 BPM | SYSTOLIC BLOOD PRESSURE: 130 MMHG | WEIGHT: 115 LBS | OXYGEN SATURATION: 98 % | RESPIRATION RATE: 14 BRPM | BODY MASS INDEX: 20.38 KG/M2

## 2024-05-12 DIAGNOSIS — T07.XXXA CONTUSION OF MULTIPLE SITES: Primary | ICD-10-CM

## 2024-05-12 DIAGNOSIS — S63.617A SPRAIN OF LEFT LITTLE FINGER, UNSPECIFIED SITE OF DIGIT, INITIAL ENCOUNTER: ICD-10-CM

## 2024-05-12 DIAGNOSIS — M54.31 BILATERAL SCIATICA: ICD-10-CM

## 2024-05-12 DIAGNOSIS — M54.32 BILATERAL SCIATICA: ICD-10-CM

## 2024-05-12 PROCEDURE — 96372 THER/PROPH/DIAG INJ SC/IM: CPT

## 2024-05-12 PROCEDURE — 6360000002 HC RX W HCPCS: Performed by: EMERGENCY MEDICINE

## 2024-05-12 PROCEDURE — 99284 EMERGENCY DEPT VISIT MOD MDM: CPT

## 2024-05-12 RX ORDER — KETOROLAC TROMETHAMINE 30 MG/ML
30 INJECTION, SOLUTION INTRAMUSCULAR; INTRAVENOUS ONCE
Status: COMPLETED | OUTPATIENT
Start: 2024-05-13 | End: 2024-05-12

## 2024-05-12 RX ORDER — ORPHENADRINE CITRATE 30 MG/ML
60 INJECTION INTRAMUSCULAR; INTRAVENOUS ONCE
Status: COMPLETED | OUTPATIENT
Start: 2024-05-13 | End: 2024-05-12

## 2024-05-12 RX ADMIN — ORPHENADRINE CITRATE 60 MG: 30 INJECTION, SOLUTION INTRAMUSCULAR; INTRAVENOUS at 23:58

## 2024-05-12 RX ADMIN — KETOROLAC TROMETHAMINE 30 MG: 30 INJECTION, SOLUTION INTRAMUSCULAR at 23:58

## 2024-05-12 ASSESSMENT — PAIN SCALES - GENERAL: PAINLEVEL_OUTOF10: 8

## 2024-05-12 ASSESSMENT — PAIN DESCRIPTION - DESCRIPTORS
DESCRIPTORS: ACHING
DESCRIPTORS_2: SHARP

## 2024-05-12 ASSESSMENT — PAIN DESCRIPTION - ORIENTATION
ORIENTATION_2: LEFT
ORIENTATION: LOWER

## 2024-05-12 ASSESSMENT — PAIN DESCRIPTION - INTENSITY: RATING_2: 8

## 2024-05-12 ASSESSMENT — PAIN DESCRIPTION - LOCATION
LOCATION: BACK
LOCATION_2: HAND

## 2024-05-12 ASSESSMENT — PAIN DESCRIPTION - PAIN TYPE: TYPE: ACUTE PAIN

## 2024-05-12 ASSESSMENT — PAIN - FUNCTIONAL ASSESSMENT: PAIN_FUNCTIONAL_ASSESSMENT: 0-10

## 2024-05-13 ENCOUNTER — APPOINTMENT (OUTPATIENT)
Dept: GENERAL RADIOLOGY | Age: 54
End: 2024-05-13
Payer: COMMERCIAL

## 2024-05-13 PROCEDURE — 72100 X-RAY EXAM L-S SPINE 2/3 VWS: CPT

## 2024-05-13 PROCEDURE — 73130 X-RAY EXAM OF HAND: CPT

## 2024-05-13 RX ORDER — METHYLPREDNISOLONE 4 MG/1
TABLET ORAL
Qty: 1 KIT | Refills: 0 | Status: SHIPPED | OUTPATIENT
Start: 2024-05-13 | End: 2024-05-19

## 2024-05-13 RX ORDER — CYCLOBENZAPRINE HCL 10 MG
10 TABLET ORAL 3 TIMES DAILY PRN
Qty: 21 TABLET | Refills: 0 | Status: SHIPPED | OUTPATIENT
Start: 2024-05-13 | End: 2024-05-23

## 2024-05-13 NOTE — ED TRIAGE NOTES
Mode of arrival (squad #, walk in, police, etc) : walked in         Chief complaint(s): fall back and left hand pain continued         Arrival Note (brief scenario, treatment PTA, etc).: slipped in tub shower last Tuesday with new lower back  k pain and continued left hand pain with pain to bilat lateral thighs        C= \"Have you ever felt that you should Cut down on your drinking?\"  No  A= \"Have people Annoyed you by criticizing your drinking?\"  No  G= \"Have you ever felt bad or Guilty about your drinking?\"  No  E= \"Have you ever had a drink as an Eye-opener first thing in the morning to steady your nerves or to help a hangover?\"  No      Deferred []      Reason for deferring: N/A    *If yes to two or more: probable alcohol abuse.*

## 2024-05-13 NOTE — ED PROVIDER NOTES
eMERGENCY dEPARTMENT eNCOUnter      Pt Name: Elvi Mejia  MRN: [id number]  Birthdate 1970  Date of evaluation: 5/12/24      CHIEF COMPLAINT       Chief Complaint   Patient presents with    Back Pain    Hand Injury    Fall     Fell in tub 5 days ago         HISTORY OF PRESENT ILLNESS    Elvi Mejia is a 53 y.o. female who presents complaining of fall.  Patient states that she fell about 5 days ago in the shower landed on her butt and kind of pulled her left pinky finger back.  Patient states since then she has been having increasing pain in her lower back and in the finger.  Patient also complains of bilateral thigh numbness.  Patient has no difficulty walking and no loss of bowel or bladder control.  Patient has no abdominal pain head injury or vomiting.      REVIEW OF SYSTEMS       Review of Systems   Constitutional:  Negative for activity change, appetite change, chills, diaphoresis and fever.   HENT:  Negative for congestion, ear pain, facial swelling, nosebleeds, rhinorrhea, sinus pressure, sore throat and trouble swallowing.    Eyes:  Negative for pain, discharge and redness.   Respiratory:  Negative for cough, chest tightness, shortness of breath and wheezing.    Cardiovascular:  Negative for chest pain, palpitations and leg swelling.   Gastrointestinal:  Negative for abdominal pain, blood in stool, constipation, diarrhea, nausea and vomiting.   Genitourinary:  Negative for difficulty urinating, dysuria, flank pain, frequency, genital sores and hematuria.   Musculoskeletal:  Positive for back pain. Negative for arthralgias, gait problem, joint swelling, myalgias and neck pain.        Fall, finger pain   Skin:  Negative for color change, pallor, rash and wound.   Neurological:  Positive for numbness. Negative for dizziness, tremors, seizures, syncope, speech difficulty, weakness and headaches.   Psychiatric/Behavioral:  Negative for confusion, decreased concentration, hallucinations, self-injury,

## 2024-10-26 ENCOUNTER — HOSPITAL ENCOUNTER (EMERGENCY)
Age: 54
Discharge: HOME | End: 2024-10-26
Attending: EMERGENCY MEDICINE
Payer: COMMERCIAL

## 2024-10-26 ENCOUNTER — APPOINTMENT (OUTPATIENT)
Dept: GENERAL RADIOLOGY | Age: 54
End: 2024-10-26
Payer: COMMERCIAL

## 2024-10-26 VITALS
HEART RATE: 85 BPM | TEMPERATURE: 97.8 F | OXYGEN SATURATION: 97 % | HEIGHT: 63 IN | SYSTOLIC BLOOD PRESSURE: 140 MMHG | BODY MASS INDEX: 22.15 KG/M2 | DIASTOLIC BLOOD PRESSURE: 90 MMHG | RESPIRATION RATE: 20 BRPM | WEIGHT: 125 LBS

## 2024-10-26 DIAGNOSIS — S30.0XXA CONTUSION OF BUTTOCK, INITIAL ENCOUNTER: Primary | ICD-10-CM

## 2024-10-26 PROCEDURE — 6370000000 HC RX 637 (ALT 250 FOR IP): Performed by: EMERGENCY MEDICINE

## 2024-10-26 PROCEDURE — 73502 X-RAY EXAM HIP UNI 2-3 VIEWS: CPT

## 2024-10-26 PROCEDURE — 99283 EMERGENCY DEPT VISIT LOW MDM: CPT

## 2024-10-26 RX ORDER — ACETAMINOPHEN 500 MG
1000 TABLET ORAL EVERY 6 HOURS PRN
Qty: 60 TABLET | Refills: 0 | Status: SHIPPED | OUTPATIENT
Start: 2024-10-26

## 2024-10-26 RX ORDER — IBUPROFEN 600 MG/1
600 TABLET, FILM COATED ORAL EVERY 6 HOURS PRN
Qty: 60 TABLET | Refills: 0 | Status: SHIPPED | OUTPATIENT
Start: 2024-10-26

## 2024-10-26 RX ORDER — ONDANSETRON 4 MG/1
4 TABLET, ORALLY DISINTEGRATING ORAL ONCE
Status: COMPLETED | OUTPATIENT
Start: 2024-10-26 | End: 2024-10-26

## 2024-10-26 RX ADMIN — ONDANSETRON 4 MG: 4 TABLET, ORALLY DISINTEGRATING ORAL at 14:03

## 2024-10-26 ASSESSMENT — LIFESTYLE VARIABLES
HOW MANY STANDARD DRINKS CONTAINING ALCOHOL DO YOU HAVE ON A TYPICAL DAY: PATIENT DOES NOT DRINK
HOW OFTEN DO YOU HAVE A DRINK CONTAINING ALCOHOL: NEVER

## 2024-10-26 NOTE — ED PROVIDER NOTES
EMERGENCY DEPARTMENT ENCOUNTER    Pt Name: Elvi Mejia  MRN: [id number]  Birthdate 1970  Date of evaluation: 10/26/24  CHIEF COMPLAINT       Chief Complaint   Patient presents with    Back Pain    Hip Pain     HISTORY OF PRESENT ILLNESS   HPI  [place]  [place] her [place] allegedly and she fell down onto the concrete on her left buttock.  She is having persistent pain there.  She had a bruised bump there that is improved but  to touch.  She is able to ambulate.  Did not hit her head, no LOC.  Just having some persistent pain over her left hip buttock.  No pain in her knee.  No back pain.  No weakness in her arms legs, no numbness tingling or paresthesias.      PASTMEDICAL HISTORY     Past Medical History:   Diagnosis Date    Arthritis     COPD (chronic obstructive pulmonary disease) (Formerly [facility])     Full dentures     Heart murmur     MI, old     Trigger finger of thumb     Hx of b/l     Past Problem List  Patient Active Problem List   Diagnosis Code    Chronic bilateral low back pain with bilateral sciatica M54.42, M54.41, G89.29    Chronic hip pain, right M25.551, G89.29    Chronic obstructive pulmonary disease (HCC) J44.9    History of myocardial infarction I25.2    Tympanostomy tube check Z45.89    DDD (degenerative disc disease), lumbar M51.369    Smokes and motivated to quit F17.200    Vertigo R42    Shortened DC interval R94.31    Vitamin D deficiency E55.9    Hyperglycemia R73.9    Leukocytosis D72.829    Cyst of left sphenoid sinus J34.1    Tricuspid regurgitation I07.1    Recurrent urinary tract infection N39.0    Abdominal bloating R14.0    Mood swings R45.86    Agitation R45.1    Hot flashes, menopausal N95.1    Pain of right thumb- Mild-moderate partial-thickness tear of the ulnar collateral ligament of the right thumb per MRI 3-17-20 M79.644    Skier's thumb, right, subsequent encounter S63.641D    Trigger finger of right thumb M65.311    Trigger thumb, left thumb M65.312    Medications    ondansetron (ZOFRAN-ODT) disintegrating tablet 4 mg    acetaminophen (TYLENOL) 500 MG tablet     Sig: Take 2 tablets by mouth every 6 hours as needed for Pain     Dispense:  60 tablet     Refill:  0    ibuprofen (IBU) 600 MG tablet     Sig: Take 1 tablet by mouth every 6 hours as needed for Pain     Dispense:  60 tablet     Refill:  0     DISCHARGE PRESCRIPTIONS:  Discharge Medication List as of 10/26/2024  2:56 PM        PHYSICIAN CONSULTS ORDERED THIS ENCOUNTER:  None  FINAL IMPRESSION      1. Contusion of buttock, initial encounter          DISPOSITION/PLAN   DISPOSITION Decision To Discharge 10/26/2024 02:55:33 PM           OUTPATIENT FOLLOW UP THE PATIENT:  Eran Hdezarturo MCGHEE, APRN - NP  [id number] [facility] [address]  [facility] [address] [phone]    Schedule an appointment as soon as possible for a visit in 2 days        MD Huang Goldberg Wesley D, MD  10/26/24 5015

## 2024-10-26 NOTE — ED NOTES
Chaperone for Intimate Exam  Chaperone was offered and accepted as part of the rooming process.  Chaperoned Dr. Encinas with buttocks skin exam

## 2024-10-26 NOTE — ED TRIAGE NOTES
Mode of arrival (squad #, walk in, police, etc) : walk-in        Chief complaint(s): fall/left buttocks pain        Arrival Note (brief scenario, treatment PTA, etc).: [place] man [place] her [place]. Pt has had since the fall.        C= \"Have you ever felt that you should Cut down on your drinking?\"  No  A= \"Have people Annoyed you by criticizing your drinking?\"  No  G= \"Have you ever felt bad or Guilty about your drinking?\"  No  E= \"Have you ever had a drink as an Eye-opener first thing in the morning to steady your nerves or to help a hangover?\"  No      Deferred []      Reason for deferring: N/A    *If yes to two or more: probable alcohol abuse.*

## 2024-10-27 ENCOUNTER — APPOINTMENT (OUTPATIENT)
Dept: CT IMAGING | Age: 54
End: 2024-10-27
Payer: COMMERCIAL

## 2024-10-27 ENCOUNTER — HOSPITAL ENCOUNTER (EMERGENCY)
Age: 54
Discharge: HOME | End: 2024-10-27
Attending: EMERGENCY MEDICINE
Payer: COMMERCIAL

## 2024-10-27 VITALS
DIASTOLIC BLOOD PRESSURE: 88 MMHG | BODY MASS INDEX: 22.15 KG/M2 | HEART RATE: 89 BPM | RESPIRATION RATE: 17 BRPM | SYSTOLIC BLOOD PRESSURE: 136 MMHG | HEIGHT: 63 IN | TEMPERATURE: 97.9 F | WEIGHT: 125 LBS | OXYGEN SATURATION: 97 %

## 2024-10-27 DIAGNOSIS — S30.0XXA CONTUSION OF LEFT BUTTOCK: ICD-10-CM

## 2024-10-27 DIAGNOSIS — M53.3 COCCYX PAIN: Primary | ICD-10-CM

## 2024-10-27 PROCEDURE — 72131 CT LUMBAR SPINE W/O DYE: CPT

## 2024-10-27 PROCEDURE — 6370000000 HC RX 637 (ALT 250 FOR IP)

## 2024-10-27 PROCEDURE — 99284 EMERGENCY DEPT VISIT MOD MDM: CPT

## 2024-10-27 PROCEDURE — 72192 CT PELVIS W/O DYE: CPT

## 2024-10-27 RX ORDER — OXYCODONE HYDROCHLORIDE 5 MG/1
5 TABLET ORAL ONCE
Status: COMPLETED | OUTPATIENT
Start: 2024-10-27 | End: 2024-10-27

## 2024-10-27 RX ORDER — OXYCODONE HYDROCHLORIDE 5 MG/1
5 TABLET ORAL EVERY 6 HOURS PRN
Qty: 12 TABLET | Refills: 0 | Status: SHIPPED | OUTPATIENT
Start: 2024-10-27 | End: 2024-10-30

## 2024-10-27 RX ORDER — LIDOCAINE 50 MG/G
1 PATCH TOPICAL DAILY
Qty: 10 PATCH | Refills: 0 | Status: SHIPPED | OUTPATIENT
Start: 2024-10-27 | End: 2024-11-06

## 2024-10-27 RX ADMIN — OXYCODONE HYDROCHLORIDE 5 MG: 5 TABLET ORAL at 23:06

## 2024-10-27 ASSESSMENT — PAIN DESCRIPTION - PAIN TYPE: TYPE: ACUTE PAIN

## 2024-10-27 ASSESSMENT — PAIN SCALES - GENERAL
PAINLEVEL_OUTOF10: 8
PAINLEVEL_OUTOF10: 7

## 2024-10-27 ASSESSMENT — PAIN DESCRIPTION - FREQUENCY: FREQUENCY: CONTINUOUS

## 2024-10-27 ASSESSMENT — PAIN DESCRIPTION - LOCATION
LOCATION: BACK;BUTTOCKS
LOCATION: BACK

## 2024-10-27 ASSESSMENT — PAIN DESCRIPTION - ORIENTATION: ORIENTATION: LOWER

## 2024-10-27 ASSESSMENT — PAIN DESCRIPTION - DESCRIPTORS: DESCRIPTORS: STABBING

## 2024-10-27 ASSESSMENT — PAIN - FUNCTIONAL ASSESSMENT: PAIN_FUNCTIONAL_ASSESSMENT: 0-10

## 2024-10-28 NOTE — ED NOTES
Mode of arrival (squad #, walk in, police, etc) : walk-in        Chief complaint(s): lower back pain        Arrival Note (brief scenario, treatment PTA, etc).: Patient presents with worsening lower back pain. Patient states she had a fall Thursday and landed on her butt.         C= \"Have you ever felt that you should Cut down on your drinking?\"  No  A= \"Have people Annoyed you by criticizing your drinking?\"  No  G= \"Have you ever felt bad or Guilty about your drinking?\"  No  E= \"Have you ever had a drink as an Eye-opener first thing in the morning to steady your nerves or to help a hangover?\"  No      Deferred []      Reason for deferring: N/A    *If yes to two or more: probable alcohol abuse.*

## 2024-10-28 NOTE — DISCHARGE INSTRUCTIONS
You were seen in the ER today for concern of ongoing tailbone pain and left buttock pain as well as lower back pain after sustaining a fall several days ago.  We obtained a CT of your lower spine as well as your coccyx/pelvic region.  You do not have any fractures.  You will need to use a supportive pillow to sit down and this may help your pain.  It is called a doughnut pillow.  You can search this on [employer] and find something similar.  We do not carry these in our ER unfortunately.  I will send you home with a very short course of pain medication, please use this sparingly as it can be addictive.  Use Tylenol and ibuprofen at home primarily for your discomfort.
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
## 2024-10-28 NOTE — ED PROVIDER NOTES
[facility] ED  eMERGENCY dEPARTMENT eNCOUnter   Attending Attestation     Pt Name: Elvi Mejia  MRN: [id number]  Birthdate 1970  Date of evaluation: 10/27/24       Elvi Mejia is a 54 y.o. female who presents with Tailbone Pain (Had a fall Thursday, landed on her butt, worsening pain today)      History:   Patient fell a few days ago landing on her buttock.  Patient was here yesterday complaining of pain mostly in her left buttock had an x-ray of the hip area was negative discharged home Motrin and Tylenol.  Patient states now the pain is more midline in the lower back and tailbone and she was not able to make it through work so she had to leave and so she came back.  No numbness tingling or weakness but she does have a burning pain going down to the back of her knee on that left side.  Patient has no bowel or bladder incontinence or retention.    Exam: Vitals:   Vitals:    10/27/24 2254   BP: 136/88   Pulse: 89   Resp: 17   Temp: 97.9 °F (36.6 °C)   TempSrc: Oral   SpO2: 97%   Weight: 56.7 kg (125 lb)   Height: 1.6 m (5' 3\")     Tenderness palpation in the midline lower back with no neurologic deficits.    I performed a history and physical examination of the patient and discussed management with the resident. I reviewed the resident’s note and agree with the documented findings and plan of care. Any areas of disagreement are noted on the chart. I was personally present for the key portions of any procedures. I have documented in the chart those procedures where I was not present during the key portions. I have personally reviewed all images and agree with the resident's interpretation. I have reviewed the emergency nurses triage note. I agree with the chief complaint, past medical history, past surgical history, allergies, medications, social and family history as documented unless otherwise noted below. Documentation of the HPI, Physical Exam and Medical Decision Making performed by medical

## (undated) DEVICE — SUTURE PROL SZ 4-0 L18IN NONABSORBABLE BLU L19MM FS-2 3/8 8683G

## (undated) DEVICE — STRAP,POSITIONING,KNEE/BODY,FOAM,4X60": Brand: MEDLINE

## (undated) DEVICE — CHLORAPREP 26ML ORANGE

## (undated) DEVICE — GLOVE SURG SZ 8 L12IN FNGR THK79MIL GRN LTX FREE

## (undated) DEVICE — BANDAGE,GAUZE,BULKEE II,4.5"X4.1YD,STRL: Brand: MEDLINE

## (undated) DEVICE — MERCY HEALTH ST CHARLES: Brand: MEDLINE INDUSTRIES, INC.

## (undated) DEVICE — SYRINGE MED 10ML LUERLOCK TIP W/O SFTY DISP

## (undated) DEVICE — Device

## (undated) DEVICE — STANDARD HYPODERMIC NEEDLE,POLYPROPYLENE HUB: Brand: MONOJECT

## (undated) DEVICE — BANDAGE COMPR W3INXL5YD WHT BGE POLY COT M E WRP WV HK AND

## (undated) DEVICE — GLOVE SURG SZ 8 L12IN THK75MIL DK GRN LTX FREE

## (undated) DEVICE — Z DISCONTINUED USE 2275686 GLOVE SURG SZ 8 L12IN FNGR THK13MIL WHT ISOLEX POLYISOPRENE

## (undated) DEVICE — GLOVE ORTHO 8   MSG9480

## (undated) DEVICE — SUTURE NONABSORBABLE MONOFILAMENT 4-0 PS-2 18 IN BLU PROLENE 8682H

## (undated) DEVICE — DISPOSABLE TOURNIQUET CUFF SINGLE BLADDER, SINGLE PORT AND QUICK CONNECT CONNECTOR: Brand: COLOR CUFF

## (undated) DEVICE — MHPB HAND AND FOOT PACK: Brand: MEDLINE INDUSTRIES, INC.

## (undated) DEVICE — SOLUTION IV IRRIG POUR BRL 0.9% SODIUM CHL 2F7124

## (undated) DEVICE — GOWN,AURORA,NONREINFORCED,LARGE: Brand: MEDLINE